# Patient Record
Sex: MALE | Race: BLACK OR AFRICAN AMERICAN | NOT HISPANIC OR LATINO | Employment: UNEMPLOYED | URBAN - METROPOLITAN AREA
[De-identification: names, ages, dates, MRNs, and addresses within clinical notes are randomized per-mention and may not be internally consistent; named-entity substitution may affect disease eponyms.]

---

## 2023-06-07 ENCOUNTER — OFFICE VISIT (OUTPATIENT)
Dept: PRIMARY CARE | Facility: CLINIC | Age: 38
End: 2023-06-07
Payer: MEDICAID

## 2023-06-07 VITALS
HEART RATE: 96 BPM | TEMPERATURE: 97.1 F | HEIGHT: 69 IN | BODY MASS INDEX: 23.85 KG/M2 | WEIGHT: 161 LBS | DIASTOLIC BLOOD PRESSURE: 79 MMHG | SYSTOLIC BLOOD PRESSURE: 122 MMHG

## 2023-06-07 DIAGNOSIS — Z20.2 STD EXPOSURE: ICD-10-CM

## 2023-06-07 DIAGNOSIS — Z00.00 HEALTHCARE MAINTENANCE: ICD-10-CM

## 2023-06-07 DIAGNOSIS — F17.210 SMOKING GREATER THAN 20 PACK YEARS: ICD-10-CM

## 2023-06-07 DIAGNOSIS — H53.2 DIPLOPIA: Primary | ICD-10-CM

## 2023-06-07 DIAGNOSIS — R14.0 ABDOMINAL DISTENTION: ICD-10-CM

## 2023-06-07 LAB
ALANINE AMINOTRANSFERASE (SGPT) (U/L) IN SER/PLAS: 17 U/L (ref 10–52)
ALBUMIN (G/DL) IN SER/PLAS: 4.7 G/DL (ref 3.4–5)
ALKALINE PHOSPHATASE (U/L) IN SER/PLAS: 72 U/L (ref 33–120)
ANION GAP IN SER/PLAS: 12 MMOL/L (ref 10–20)
ASPARTATE AMINOTRANSFERASE (SGOT) (U/L) IN SER/PLAS: 14 U/L (ref 9–39)
BASOPHILS (10*3/UL) IN BLOOD BY AUTOMATED COUNT: 0.02 X10E9/L (ref 0–0.1)
BASOPHILS/100 LEUKOCYTES IN BLOOD BY AUTOMATED COUNT: 0.5 % (ref 0–2)
BILIRUBIN TOTAL (MG/DL) IN SER/PLAS: 0.6 MG/DL (ref 0–1.2)
CALCIDIOL (25 OH VITAMIN D3) (NG/ML) IN SER/PLAS: 54 NG/ML
CALCIUM (MG/DL) IN SER/PLAS: 10 MG/DL (ref 8.6–10.6)
CARBON DIOXIDE, TOTAL (MMOL/L) IN SER/PLAS: 28 MMOL/L (ref 21–32)
CHLORIDE (MMOL/L) IN SER/PLAS: 104 MMOL/L (ref 98–107)
CHOLESTEROL (MG/DL) IN SER/PLAS: 177 MG/DL (ref 0–199)
CHOLESTEROL IN HDL (MG/DL) IN SER/PLAS: 36.2 MG/DL
CHOLESTEROL/HDL RATIO: 4.9
CREATININE (MG/DL) IN SER/PLAS: 1.3 MG/DL (ref 0.5–1.3)
EOSINOPHILS (10*3/UL) IN BLOOD BY AUTOMATED COUNT: 0.13 X10E9/L (ref 0–0.7)
EOSINOPHILS/100 LEUKOCYTES IN BLOOD BY AUTOMATED COUNT: 3 % (ref 0–6)
ERYTHROCYTE DISTRIBUTION WIDTH (RATIO) BY AUTOMATED COUNT: 12.1 % (ref 11.5–14.5)
ERYTHROCYTE MEAN CORPUSCULAR HEMOGLOBIN CONCENTRATION (G/DL) BY AUTOMATED: 31.4 G/DL (ref 32–36)
ERYTHROCYTE MEAN CORPUSCULAR VOLUME (FL) BY AUTOMATED COUNT: 87 FL (ref 80–100)
ERYTHROCYTES (10*6/UL) IN BLOOD BY AUTOMATED COUNT: 5.49 X10E12/L (ref 4.5–5.9)
GFR MALE: 73 ML/MIN/1.73M2
GLUCOSE (MG/DL) IN SER/PLAS: 97 MG/DL (ref 74–99)
HEMATOCRIT (%) IN BLOOD BY AUTOMATED COUNT: 47.7 % (ref 41–52)
HEMOGLOBIN (G/DL) IN BLOOD: 15 G/DL (ref 13.5–17.5)
HEPATITIS B VIRUS SURFACE AG PRESENCE IN SERUM: NONREACTIVE
HEPATITIS C VIRUS AB PRESENCE IN SERUM: NONREACTIVE
HIV 1/ 2 AG/AB SCREEN: NONREACTIVE
IMMATURE GRANULOCYTES/100 LEUKOCYTES IN BLOOD BY AUTOMATED COUNT: 0.2 % (ref 0–0.9)
LDL: 112 MG/DL (ref 0–99)
LEUKOCYTES (10*3/UL) IN BLOOD BY AUTOMATED COUNT: 4.3 X10E9/L (ref 4.4–11.3)
LYMPHOCYTES (10*3/UL) IN BLOOD BY AUTOMATED COUNT: 1.27 X10E9/L (ref 1.2–4.8)
LYMPHOCYTES/100 LEUKOCYTES IN BLOOD BY AUTOMATED COUNT: 29.3 % (ref 13–44)
MONOCYTES (10*3/UL) IN BLOOD BY AUTOMATED COUNT: 0.41 X10E9/L (ref 0.1–1)
MONOCYTES/100 LEUKOCYTES IN BLOOD BY AUTOMATED COUNT: 9.4 % (ref 2–10)
NEUTROPHILS (10*3/UL) IN BLOOD BY AUTOMATED COUNT: 2.5 X10E9/L (ref 1.2–7.7)
NEUTROPHILS/100 LEUKOCYTES IN BLOOD BY AUTOMATED COUNT: 57.6 % (ref 40–80)
NRBC (PER 100 WBCS) BY AUTOMATED COUNT: 0 /100 WBC (ref 0–0)
PLATELETS (10*3/UL) IN BLOOD AUTOMATED COUNT: 246 X10E9/L (ref 150–450)
POTASSIUM (MMOL/L) IN SER/PLAS: 4.8 MMOL/L (ref 3.5–5.3)
PROTEIN TOTAL: 7.4 G/DL (ref 6.4–8.2)
SODIUM (MMOL/L) IN SER/PLAS: 139 MMOL/L (ref 136–145)
THYROTROPIN (MIU/L) IN SER/PLAS BY DETECTION LIMIT <= 0.05 MIU/L: 0.83 MIU/L (ref 0.44–3.98)
TRIGLYCERIDE (MG/DL) IN SER/PLAS: 146 MG/DL (ref 0–149)
UREA NITROGEN (MG/DL) IN SER/PLAS: 11 MG/DL (ref 6–23)
VLDL: 29 MG/DL (ref 0–40)

## 2023-06-07 PROCEDURE — 80053 COMPREHEN METABOLIC PANEL: CPT

## 2023-06-07 PROCEDURE — 99385 PREV VISIT NEW AGE 18-39: CPT | Performed by: FAMILY MEDICINE

## 2023-06-07 PROCEDURE — 82306 VITAMIN D 25 HYDROXY: CPT

## 2023-06-07 PROCEDURE — 87340 HEPATITIS B SURFACE AG IA: CPT

## 2023-06-07 PROCEDURE — 84443 ASSAY THYROID STIM HORMONE: CPT

## 2023-06-07 PROCEDURE — 80061 LIPID PANEL: CPT

## 2023-06-07 PROCEDURE — 86592 SYPHILIS TEST NON-TREP QUAL: CPT

## 2023-06-07 PROCEDURE — 87389 HIV-1 AG W/HIV-1&-2 AB AG IA: CPT

## 2023-06-07 PROCEDURE — 87491 CHLMYD TRACH DNA AMP PROBE: CPT

## 2023-06-07 PROCEDURE — 85025 COMPLETE CBC W/AUTO DIFF WBC: CPT

## 2023-06-07 PROCEDURE — 86803 HEPATITIS C AB TEST: CPT

## 2023-06-07 PROCEDURE — 87591 N.GONORRHOEAE DNA AMP PROB: CPT
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
## 2023-06-07 NOTE — PROGRESS NOTES
"I saw and evaluated the patient. I personally obtained the key and critical portions of the history and physical exam. I reviewed the student 's documentation and discussed the patient with the student. I agree with the student medical decision making as documented in the student's noteSubjective   Patient ID: Jesse Rico is a 38 y.o. male who presents for No chief complaint on file..    HPI     Review of Systems    Objective   /79   Pulse 96   Temp 36.2 °C (97.1 °F)   Ht 1.753 m (5' 9\")   Wt 73 kg (161 lb)   BMI 23.78 kg/m²     Physical Exam    Assessment/Plan     I saw and evaluated the patient. I personally obtained the key and critical portions of the history and physical exam. I reviewed the student 's documentation and discussed the patient with the student. I agree with the student medical decision making as documented in the student's note    He does have a ventral hernia abdominal distention could be due to weak muscles but I will order ultrasound of the abdomen to rule out any other pathology    I firmly advised patient to get to the gym if possible daily to increase his aerobic activity and also muscle strengthening activities    This will help to condition his body and also to gain muscles which will help him to have a better appetite better attitude about himself    I also advised him to look for a job his hobbies martial arts and music    Advised him to get into a job where he can train people on martial arts and that will help him to feel good about himself as well    Routine labs were drawn today and also checks for STD referred him to neurology for the new onset of diplopia referred him to smoking cessation class    Advised him to come back in 3 months         "

## 2023-06-07 NOTE — PATIENT INSTRUCTIONS
1.  Every day sleep  at night or rest ( some days we are unable to sleep )around the same time without the TV-this is important habit to reduce dementia and aging prematurely    2.  Eat 3 cups of green leafy vegetables daily include at least 1 fruit.,  Reduce animal protein consumption-/ also  Starch  consumption  --this will help to lose weight avoid illnesses such as dementia high blood pressure cancer.,  Diabetes    3.  Exercise including aerobics as well as resistant exercise for at least 45 minutes a day for 5 days this will also help to reduce premature aging     4.  Please see the neurologist since  you have this double vision and is  newly developed will need to find out what is wrong    5.  Also I referred you to smoking cessation class    Please see me back in 3 months

## 2023-06-08 LAB
CHLAMYDIA TRACH., AMPLIFIED: NEGATIVE
N. GONORRHEA, AMPLIFIED: NEGATIVE
RPR MONITORING: NONREACTIVE

## 2024-01-11 ENCOUNTER — HOSPITAL ENCOUNTER (EMERGENCY)
Facility: HOSPITAL | Age: 39
Discharge: HOME | End: 2024-01-11
Attending: EMERGENCY MEDICINE
Payer: MEDICAID

## 2024-01-11 ENCOUNTER — CLINICAL SUPPORT (OUTPATIENT)
Dept: EMERGENCY MEDICINE | Facility: HOSPITAL | Age: 39
End: 2024-01-11
Payer: MEDICAID

## 2024-01-11 VITALS
HEIGHT: 68 IN | HEART RATE: 55 BPM | OXYGEN SATURATION: 98 % | DIASTOLIC BLOOD PRESSURE: 78 MMHG | SYSTOLIC BLOOD PRESSURE: 130 MMHG | TEMPERATURE: 97.2 F | RESPIRATION RATE: 14 BRPM | BODY MASS INDEX: 26.52 KG/M2 | WEIGHT: 175 LBS

## 2024-01-11 DIAGNOSIS — T40.2X1A OPIOID OVERDOSE, ACCIDENTAL OR UNINTENTIONAL, INITIAL ENCOUNTER (MULTI): Primary | ICD-10-CM

## 2024-01-11 LAB
ALBUMIN SERPL BCP-MCNC: 4.4 G/DL (ref 3.4–5)
ALP SERPL-CCNC: 68 U/L (ref 33–120)
ALT SERPL W P-5'-P-CCNC: 24 U/L (ref 10–52)
AMPHETAMINES UR QL SCN: ABNORMAL
ANION GAP BLDV CALCULATED.4IONS-SCNC: 12 MMOL/L (ref 10–25)
ANION GAP SERPL CALC-SCNC: 14 MMOL/L (ref 10–20)
APAP SERPL-MCNC: <10 UG/ML
AST SERPL W P-5'-P-CCNC: 24 U/L (ref 9–39)
BARBITURATES UR QL SCN: ABNORMAL
BASE EXCESS BLDV CALC-SCNC: -4.2 MMOL/L (ref -2–3)
BENZODIAZ UR QL SCN: ABNORMAL
BILIRUB SERPL-MCNC: 0.5 MG/DL (ref 0–1.2)
BODY TEMPERATURE: 37 DEGREES CELSIUS
BUN SERPL-MCNC: 7 MG/DL (ref 6–23)
BZE UR QL SCN: ABNORMAL
CA-I BLDV-SCNC: 1.18 MMOL/L (ref 1.1–1.33)
CALCIUM SERPL-MCNC: 8.9 MG/DL (ref 8.6–10.6)
CANNABINOIDS UR QL SCN: ABNORMAL
CHLORIDE BLDV-SCNC: 105 MMOL/L (ref 98–107)
CHLORIDE SERPL-SCNC: 105 MMOL/L (ref 98–107)
CO2 SERPL-SCNC: 24 MMOL/L (ref 21–32)
CREAT SERPL-MCNC: 1.23 MG/DL (ref 0.5–1.3)
EGFRCR SERPLBLD CKD-EPI 2021: 78 ML/MIN/1.73M*2
ERYTHROCYTE [DISTWIDTH] IN BLOOD BY AUTOMATED COUNT: 12.4 % (ref 11.5–14.5)
ETHANOL SERPL-MCNC: <10 MG/DL
FENTANYL+NORFENTANYL UR QL SCN: ABNORMAL
GLUCOSE BLDV-MCNC: 213 MG/DL (ref 74–99)
GLUCOSE SERPL-MCNC: 275 MG/DL (ref 74–99)
HCO3 BLDV-SCNC: 22.6 MMOL/L (ref 22–26)
HCT VFR BLD AUTO: 42.3 % (ref 41–52)
HCT VFR BLD EST: 38 % (ref 41–52)
HGB BLD-MCNC: 13.4 G/DL (ref 13.5–17.5)
HGB BLDV-MCNC: 12.8 G/DL (ref 13.5–17.5)
LACTATE BLDV-SCNC: 2.2 MMOL/L (ref 0.4–2)
MCH RBC QN AUTO: 27.3 PG (ref 26–34)
MCHC RBC AUTO-ENTMCNC: 31.7 G/DL (ref 32–36)
MCV RBC AUTO: 86 FL (ref 80–100)
NRBC BLD-RTO: 0 /100 WBCS (ref 0–0)
OPIATES UR QL SCN: ABNORMAL
OXYCODONE+OXYMORPHONE UR QL SCN: ABNORMAL
OXYHGB MFR BLDV: 91.6 % (ref 45–75)
PCO2 BLDV: 47 MM HG (ref 41–51)
PCP UR QL SCN: ABNORMAL
PH BLDV: 7.29 PH (ref 7.33–7.43)
PLATELET # BLD AUTO: 286 X10*3/UL (ref 150–450)
PO2 BLDV: 103 MM HG (ref 35–45)
POTASSIUM BLDV-SCNC: 3.6 MMOL/L (ref 3.5–5.3)
POTASSIUM SERPL-SCNC: 3.2 MMOL/L (ref 3.5–5.3)
PROT SERPL-MCNC: 7 G/DL (ref 6.4–8.2)
RBC # BLD AUTO: 4.91 X10*6/UL (ref 4.5–5.9)
SALICYLATES SERPL-MCNC: <3 MG/DL
SAO2 % BLDV: 100 % (ref 45–75)
SODIUM BLDV-SCNC: 136 MMOL/L (ref 136–145)
SODIUM SERPL-SCNC: 140 MMOL/L (ref 136–145)
WBC # BLD AUTO: 11.7 X10*3/UL (ref 4.4–11.3)

## 2024-01-11 PROCEDURE — 85027 COMPLETE CBC AUTOMATED: CPT | Performed by: STUDENT IN AN ORGANIZED HEALTH CARE EDUCATION/TRAINING PROGRAM

## 2024-01-11 PROCEDURE — 99291 CRITICAL CARE FIRST HOUR: CPT | Mod: 25 | Performed by: EMERGENCY MEDICINE

## 2024-01-11 PROCEDURE — 80307 DRUG TEST PRSMV CHEM ANLYZR: CPT | Performed by: STUDENT IN AN ORGANIZED HEALTH CARE EDUCATION/TRAINING PROGRAM

## 2024-01-11 PROCEDURE — 2500000004 HC RX 250 GENERAL PHARMACY W/ HCPCS (ALT 636 FOR OP/ED): Mod: SE

## 2024-01-11 PROCEDURE — 96374 THER/PROPH/DIAG INJ IV PUSH: CPT

## 2024-01-11 PROCEDURE — 93005 ELECTROCARDIOGRAM TRACING: CPT

## 2024-01-11 PROCEDURE — 2500000001 HC RX 250 WO HCPCS SELF ADMINISTERED DRUGS (ALT 637 FOR MEDICARE OP): Mod: SE | Performed by: STUDENT IN AN ORGANIZED HEALTH CARE EDUCATION/TRAINING PROGRAM

## 2024-01-11 PROCEDURE — 84132 ASSAY OF SERUM POTASSIUM: CPT

## 2024-01-11 PROCEDURE — 36415 COLL VENOUS BLD VENIPUNCTURE: CPT | Performed by: STUDENT IN AN ORGANIZED HEALTH CARE EDUCATION/TRAINING PROGRAM

## 2024-01-11 PROCEDURE — 84132 ASSAY OF SERUM POTASSIUM: CPT | Performed by: STUDENT IN AN ORGANIZED HEALTH CARE EDUCATION/TRAINING PROGRAM

## 2024-01-11 PROCEDURE — 99291 CRITICAL CARE FIRST HOUR: CPT | Performed by: EMERGENCY MEDICINE

## 2024-01-11 PROCEDURE — 80179 DRUG ASSAY SALICYLATE: CPT | Performed by: STUDENT IN AN ORGANIZED HEALTH CARE EDUCATION/TRAINING PROGRAM

## 2024-01-11 RX ORDER — NALOXONE HYDROCHLORIDE 4 MG/.1ML
4 SPRAY NASAL AS NEEDED
Qty: 1 EACH | Refills: 0 | Status: ACTIVE
Start: 2024-01-11 | End: 2024-01-12

## 2024-01-11 RX ORDER — ONDANSETRON HYDROCHLORIDE 2 MG/ML
4 INJECTION, SOLUTION INTRAVENOUS ONCE
Status: COMPLETED | OUTPATIENT
Start: 2024-01-11 | End: 2024-01-11

## 2024-01-11 RX ORDER — NALOXONE HYDROCHLORIDE 4 MG/.1ML
SPRAY NASAL
Status: COMPLETED
Start: 2024-01-11 | End: 2024-01-11

## 2024-01-11 RX ORDER — POTASSIUM CHLORIDE 1.5 G/1.58G
20 POWDER, FOR SOLUTION ORAL ONCE
Status: COMPLETED | OUTPATIENT
Start: 2024-01-11 | End: 2024-01-11

## 2024-01-11 RX ORDER — ONDANSETRON HYDROCHLORIDE 2 MG/ML
INJECTION, SOLUTION INTRAVENOUS
Status: COMPLETED
Start: 2024-01-11 | End: 2024-01-11

## 2024-01-11 RX ADMIN — ONDANSETRON HYDROCHLORIDE 4 MG: 2 INJECTION, SOLUTION INTRAVENOUS at 11:41

## 2024-01-11 RX ADMIN — POTASSIUM CHLORIDE 20 MEQ: 1.5 POWDER, FOR SOLUTION ORAL at 13:20

## 2024-01-11 RX ADMIN — NALOXONE HYDROCHLORIDE: 4 SPRAY NASAL at 15:35

## 2024-01-11 RX ADMIN — ONDANSETRON 4 MG: 2 INJECTION INTRAMUSCULAR; INTRAVENOUS at 11:41

## 2024-01-11 ASSESSMENT — LIFESTYLE VARIABLES
HAVE YOU EVER FELT YOU SHOULD CUT DOWN ON YOUR DRINKING: NO
EVER HAD A DRINK FIRST THING IN THE MORNING TO STEADY YOUR NERVES TO GET RID OF A HANGOVER: NO
EVER FELT BAD OR GUILTY ABOUT YOUR DRINKING: NO
HAVE PEOPLE ANNOYED YOU BY CRITICIZING YOUR DRINKING: NO
REASON UNABLE TO ASSESS: NO

## 2024-01-11 ASSESSMENT — PAIN SCALES - GENERAL
PAINLEVEL_OUTOF10: 0 - NO PAIN

## 2024-01-11 ASSESSMENT — PAIN - FUNCTIONAL ASSESSMENT
PAIN_FUNCTIONAL_ASSESSMENT: 0-10
PAIN_FUNCTIONAL_ASSESSMENT: 0-10

## 2024-01-11 ASSESSMENT — COLUMBIA-SUICIDE SEVERITY RATING SCALE - C-SSRS
1. IN THE PAST MONTH, HAVE YOU WISHED YOU WERE DEAD OR WISHED YOU COULD GO TO SLEEP AND NOT WAKE UP?: NO
2. HAVE YOU ACTUALLY HAD ANY THOUGHTS OF KILLING YOURSELF?: NO
6. HAVE YOU EVER DONE ANYTHING, STARTED TO DO ANYTHING, OR PREPARED TO DO ANYTHING TO END YOUR LIFE?: NO

## 2024-01-11 NOTE — ED PROVIDER NOTES
CC: Drug Overdose     HPI:  Jesse Rico is a 38 y.o. male with a past medical history of bipolar disorder, polysubstance use, presenting to the emergency department today due to altered mental status.  He was found down by one of his friends.  He was reportedly found near a crack house.  His friend states that he is concern for possible OD.  Family does state that he has a history of significant psychiatric disorder and has OD'd in the past.  Patient was altered and unable to provide additional history on initial evaluation.    Limitations to History:  Critical condition  Additional History provided by:  Family member,    External Records Reviewed:  Recent available ED and inpatient notes reviewed in EMR.  Reviewed ED note from 6/19/2023 and outside hospital    PMHx/PSHx:  Per HPI.   - has no past medical history on file.  - has no past surgical history on file.    Medications:  Reviewed in EMR. See EMR for complete list of medications and doses.    Allergies:  Lactase    Social History:  - Tobacco:  reports that he has been smoking cigarettes. He has been smoking an average of 1 pack per day. He does not have any smokeless tobacco history on file.   - Alcohol:  reports current alcohol use of about 1.0 standard drink of alcohol per week.   - Illicit Drugs:  has no history on file for drug use.     ROS:  Per HPI.     ???????????????????????????????????????????????????????????????  Triage Vitals:  T 36.2 °C (97.2 °F)  HR 84  BP (!) 174/108  RR (!) 0  O2 (!) 24 % Supplemental oxygen    Physical Exam  Vitals and nursing note reviewed.   Constitutional:       Appearance: He is well-developed. He is ill-appearing, toxic-appearing and diaphoretic.      Comments: unresponsive   HENT:      Head: Normocephalic and atraumatic.   Eyes:      Conjunctiva/sclera: Conjunctivae normal.   Cardiovascular:      Rate and Rhythm: Normal rate and regular rhythm.      Heart sounds: No murmur heard.  Pulmonary:      Effort: Respiratory  distress present.      Breath sounds: Decreased air movement present.      Comments: apnea  Abdominal:      Palpations: Abdomen is soft.      Tenderness: There is no abdominal tenderness.   Musculoskeletal:         General: No swelling.      Cervical back: Neck supple.   Skin:     General: Skin is warm.      Capillary Refill: Capillary refill takes less than 2 seconds.   Neurological:      Mental Status: He is unresponsive.      GCS: GCS eye subscore is 1. GCS verbal subscore is 1. GCS motor subscore is 1.       ???????????????????????????????????????????????????????????????  ED Course:  ED Course as of 01/11/24 1506   u Jan 11, 2024   1031 Initial VBG with a pH of 7.07, pCO2 of 82, glucose of 291, lactate of 4.1, potassium of 3.2, obtained at 10:31 AM [SC]   1124 Repeat VBG with a pH of 7.29, glucose 213, lactate of 2.2, pCO2 47 [SC]      ED Course User Index  [SC] Albina Rico MD         Diagnoses as of 01/11/24 1506   Opioid overdose, accidental or unintentional, initial encounter (CMS/[facility])       EKG & Images:  Independently reviewed, See ED Course      MDM:  -The patient is a roughly 38-year-old male with a past medical history of bipolar disorder, polysubstance use, presenting to the emergency department today due to altered mental status.  He was found down by one of his friends.  Per further history from friends and family, he was at a crack house and his friend got called to come pick him up because he was altered.  On arrival, he was subtended, flaccid, not responsive.  He was diaphoretic and was not breathing significantly.  As such, patient was given dose of Narcan and BVM was initiated.  After dose of Narcan he did begin having some voluntary movements.  However did not appear to be purposeful, was still too altered to communicate.  As such, a second dose of Narcan was administered which continue to improve his mental status.  His glucose was normal.  Given the history of likely opioid overdose,  versus other toxidrome.  Less likely an intracranial pathology however cannot fully rule out at this time.  Lab work was obtained.  Patient did test positive for cannabis, cocaine, fentanyl.  His mental status continued to improve throughout his ED stay.  He was eventually weaned off all oxygen and was able to communicate clearly.  As such, he was continue to observe.  After several hours continued to be stable at his mental baseline.  He was offered [facility] was amenable.  [facility] will plan to coordinate placement for him.  At time of signout was pending transport to [facility].  Patient was given a take-home naloxone kit.    Final diagnoses:   [T40.2X1A] Opioid overdose, accidental or unintentional, initial encounter (CMS/[facility])       ED attending attestation:  Brought in by friend from a drug house with concerns for overdose.  History gleaned from friend.  Friend was called and told that patient was in his house and unresponsive.  Friend drove over took the patient out of the house and drove to the emergency department.  Upon arrival patient bradypneic and hypoxemic.  Patient respiratory rate 4 and pulse oximetry in the 60s.  We initiated positive pressure bag-valve-mask and after obtaining IV access administered 4 mg of naloxone.  Approximately 5 minutes after naloxone administration respiratory rate improved as did his mental status.  Patient maintained on end-tidal CO2 monitoring.  Patient's family arrived and corroborating history obtained.  Patient with history of dual diagnosis schizoaffective disorder and polysubstance use, predominantly crack cocaine.  Mother woke up at 03 100 and noticed patient was not in his bedroom.  She then Fanny to the local neighborhood and got worried that patient was in a drug house.  She called her son's friend who went in and extracted him from the house.  Patient has a history of unintentional overdose with 1 at [facility] requiring intubation and ICU stay.  Patient has a  primary care doctor, [facility] Melinda Duarte is also seeing a psychiatrist at [facility].  Patient was recently started on trazodone and offered buprenorphine.  Patient has not remarkably after naloxone administration.  Will continue to monitor and do a broad metabolic workup.  Will also get a chest x-ray given aspiration risk.  Patient's disposition will depend upon his staying at baseline.  We will then talk to our [facility] team about rehabilitation or dual diagnosis options    Social Determinants Limiting Care:  Mental health issues    Disposition:  Discharge    Albina Rico MD   Emergency Medicine Resident, PGY3  [facility]     Disclaimer: This note was dictated by speech recognition. Minor errors in transcription may be present    Procedures ? Smart[employer] last updated 1/11/2024 3:06 PM        Albina Rico MD  Resident  01/11/24 [id number]

## 2024-01-11 NOTE — ED TRIAGE NOTES
Patient presents to the Emergency department with a chief complaint of concern for overdose. Patient was brought in by a friend after he stopped breathing. Patient friend states it is possible that he could have overdosed, unsure of what it was. Patient was apneic upon arrival, given 2mg x2 of IV narcan. Patient began to protect his own airway. GCS up to 12 after initial GCS of 3

## 2024-01-11 NOTE — ED PROVIDER NOTES
HPI   Chief Complaint   Patient presents with    Drug Overdose       HPI                    Diamond Coma Scale Score: 15                  Patient History   No past medical history on file.  No past surgical history on file.  No family history on file.  Social History     Tobacco Use    Smoking status: Every Day     Packs/day: 1     Types: Cigarettes    Smokeless tobacco: Not on file   Substance Use Topics    Alcohol use: Yes     Alcohol/week: 1.0 standard drink of alcohol     Types: 1 Shots of liquor per week    Drug use: Not on file       Physical Exam   ED Triage Vitals   Temp Heart Rate Resp BP   01/11/24 1102 01/11/24 1100 01/11/24 1100 01/11/24 1100   36.2 °C (97.2 °F) 84 (!) 0 (!) 174/108      SpO2 Temp Source Heart Rate Source Patient Position   01/11/24 1100 01/11/24 1102 01/11/24 1135 --   (!) 24 % Tympanic Monitor       BP Location FiO2 (%)     -- 01/11/24 1100      21 %       Physical Exam    ED Course & MDM   ED Course as of 01/11/24 1218   Thu Jan 11, 2024   1031 Initial VBG with a pH of 7.07, pCO2 of 82, glucose of 291, lactate of 4.1, potassium of 3.2, obtained at 10:31 AM [SC]      ED Course User Index  [SC] Albina Rico MD       Medical Decision Making        Procedure  Procedures

## 2024-01-11 NOTE — ED PROCEDURE NOTE
Procedure  Critical Care    Performed by: Lawrence Shepherd MD  Authorized by: Lawrence Shepherd MD    Critical care provider statement:     Critical care time (minutes):  37    Critical care time was exclusive of:  Teaching time    Critical care was necessary to treat or prevent imminent or life-threatening deterioration of the following conditions:  Respiratory failure and toxidrome    Critical care was time spent personally by me on the following activities:  Blood draw for specimens, development of treatment plan with patient or surrogate, evaluation of patient's response to treatment, examination of patient, obtaining history from patient or surrogate, ordering and performing treatments and interventions, ordering and review of laboratory studies, ordering and review of radiographic studies, pulse oximetry and re-evaluation of patient's condition  Comments:      Patient brought in from alleged drug has with bradypnea, constricted pupils and hypoxia.  Hypoxic to 60s on room air with no respiratory effort given 4 mg of Narcan with improvement in respirations.  Patient initially with a respiratory acidemia that is improved with opiate reversal.  Patient now awake and states used heroin and cocaine today.  Will continue to monitor with end-tidal CO2 and evaluate for continued respiratory effort               Lawrence Shepherd MD  01/11/24 1020

## 2024-01-11 NOTE — DISCHARGE INSTRUCTIONS
Please avoid using any intoxicating substances particularly from people you don't know. Please keep the narcan kit with you in case of overdose.

## 2024-01-12 LAB
ATRIAL RATE: 73 BPM
P AXIS: 56 DEGREES
P OFFSET: 199 MS
P ONSET: 141 MS
PR INTERVAL: 154 MS
Q ONSET: 218 MS
QRS COUNT: 12 BEATS
QRS DURATION: 86 MS
QT INTERVAL: 406 MS
QTC CALCULATION(BAZETT): 447 MS
QTC FREDERICIA: 433 MS
R AXIS: 71 DEGREES
T AXIS: 48 DEGREES
T OFFSET: 421 MS
VENTRICULAR RATE: 73 BPM

## 2024-04-08 ENCOUNTER — HOSPITAL ENCOUNTER (EMERGENCY)
Facility: HOSPITAL | Age: 39
Discharge: HOME | End: 2024-04-08
Attending: GENERAL PRACTICE
Payer: MEDICAID

## 2024-04-08 VITALS
TEMPERATURE: 98.2 F | SYSTOLIC BLOOD PRESSURE: 116 MMHG | HEART RATE: 80 BPM | DIASTOLIC BLOOD PRESSURE: 73 MMHG | RESPIRATION RATE: 18 BRPM | OXYGEN SATURATION: 96 %

## 2024-04-08 DIAGNOSIS — F19.10 POLYSUBSTANCE ABUSE (MULTI): Primary | ICD-10-CM

## 2024-04-08 LAB
ALBUMIN SERPL BCP-MCNC: 4.8 G/DL (ref 3.4–5)
ALP SERPL-CCNC: 72 U/L (ref 33–120)
ALT SERPL W P-5'-P-CCNC: 14 U/L (ref 10–52)
AMPHETAMINES UR QL SCN: ABNORMAL
ANION GAP SERPL CALC-SCNC: 14 MMOL/L (ref 10–20)
APAP SERPL-MCNC: <10 UG/ML
AST SERPL W P-5'-P-CCNC: 15 U/L (ref 9–39)
BARBITURATES UR QL SCN: ABNORMAL
BASOPHILS # BLD AUTO: 0.02 X10*3/UL (ref 0–0.1)
BASOPHILS NFR BLD AUTO: 0.4 %
BENZODIAZ UR QL SCN: ABNORMAL
BILIRUB SERPL-MCNC: 0.9 MG/DL (ref 0–1.2)
BUN SERPL-MCNC: 13 MG/DL (ref 6–23)
BZE UR QL SCN: ABNORMAL
CALCIUM SERPL-MCNC: 9.6 MG/DL (ref 8.6–10.3)
CANNABINOIDS UR QL SCN: ABNORMAL
CHLORIDE SERPL-SCNC: 103 MMOL/L (ref 98–107)
CO2 SERPL-SCNC: 23 MMOL/L (ref 21–32)
CREAT SERPL-MCNC: 1.5 MG/DL (ref 0.5–1.3)
EGFRCR SERPLBLD CKD-EPI 2021: 61 ML/MIN/1.73M*2
EOSINOPHIL # BLD AUTO: 0.16 X10*3/UL (ref 0–0.7)
EOSINOPHIL NFR BLD AUTO: 3.1 %
ERYTHROCYTE [DISTWIDTH] IN BLOOD BY AUTOMATED COUNT: 13.2 % (ref 11.5–14.5)
ETHANOL SERPL-MCNC: <10 MG/DL
FENTANYL+NORFENTANYL UR QL SCN: ABNORMAL
FLUAV RNA RESP QL NAA+PROBE: NOT DETECTED
FLUBV RNA RESP QL NAA+PROBE: NOT DETECTED
GLUCOSE SERPL-MCNC: 93 MG/DL (ref 74–99)
HCT VFR BLD AUTO: 48.2 % (ref 41–52)
HGB BLD-MCNC: 16.2 G/DL (ref 13.5–17.5)
IMM GRANULOCYTES # BLD AUTO: 0.01 X10*3/UL (ref 0–0.7)
IMM GRANULOCYTES NFR BLD AUTO: 0.2 % (ref 0–0.9)
LYMPHOCYTES # BLD AUTO: 1.46 X10*3/UL (ref 1.2–4.8)
LYMPHOCYTES NFR BLD AUTO: 28.1 %
MCH RBC QN AUTO: 28.5 PG (ref 26–34)
MCHC RBC AUTO-ENTMCNC: 33.6 G/DL (ref 32–36)
MCV RBC AUTO: 85 FL (ref 80–100)
METHADONE UR QL SCN: ABNORMAL
MONOCYTES # BLD AUTO: 0.76 X10*3/UL (ref 0.1–1)
MONOCYTES NFR BLD AUTO: 14.6 %
NEUTROPHILS # BLD AUTO: 2.78 X10*3/UL (ref 1.2–7.7)
NEUTROPHILS NFR BLD AUTO: 53.6 %
NRBC BLD-RTO: 0 /100 WBCS (ref 0–0)
OPIATES UR QL SCN: ABNORMAL
OXYCODONE+OXYMORPHONE UR QL SCN: ABNORMAL
PCP UR QL SCN: ABNORMAL
PLATELET # BLD AUTO: 303 X10*3/UL (ref 150–450)
POTASSIUM SERPL-SCNC: 3.9 MMOL/L (ref 3.5–5.3)
PROT SERPL-MCNC: 7.2 G/DL (ref 6.4–8.2)
RBC # BLD AUTO: 5.68 X10*6/UL (ref 4.5–5.9)
SALICYLATES SERPL-MCNC: <3 MG/DL
SARS-COV-2 RNA RESP QL NAA+PROBE: NOT DETECTED
SODIUM SERPL-SCNC: 136 MMOL/L (ref 136–145)
WBC # BLD AUTO: 5.2 X10*3/UL (ref 4.4–11.3)

## 2024-04-08 PROCEDURE — 99284 EMERGENCY DEPT VISIT MOD MDM: CPT

## 2024-04-08 PROCEDURE — 80143 DRUG ASSAY ACETAMINOPHEN: CPT | Performed by: GENERAL PRACTICE

## 2024-04-08 PROCEDURE — 85025 COMPLETE CBC W/AUTO DIFF WBC: CPT | Performed by: GENERAL PRACTICE

## 2024-04-08 PROCEDURE — 80053 COMPREHEN METABOLIC PANEL: CPT | Performed by: GENERAL PRACTICE

## 2024-04-08 PROCEDURE — 99285 EMERGENCY DEPT VISIT HI MDM: CPT

## 2024-04-08 PROCEDURE — 87636 SARSCOV2 & INF A&B AMP PRB: CPT | Performed by: GENERAL PRACTICE

## 2024-04-08 PROCEDURE — 80307 DRUG TEST PRSMV CHEM ANLYZR: CPT | Performed by: GENERAL PRACTICE

## 2024-04-08 PROCEDURE — 36415 COLL VENOUS BLD VENIPUNCTURE: CPT | Performed by: GENERAL PRACTICE

## 2024-04-08 RX ORDER — ZIPRASIDONE HYDROCHLORIDE 20 MG/1
20 CAPSULE ORAL ONCE
Status: DISCONTINUED | OUTPATIENT
Start: 2024-04-08 | End: 2024-04-08

## 2024-04-08 SDOH — HEALTH STABILITY: MENTAL HEALTH: WISH TO BE DEAD (PAST 1 MONTH): NO

## 2024-04-08 SDOH — HEALTH STABILITY: MENTAL HEALTH: SUICIDAL BEHAVIOR (LIFETIME): NO

## 2024-04-08 SDOH — HEALTH STABILITY: MENTAL HEALTH: IN THE PAST WEEK, HAVE YOU BEEN HAVING THOUGHTS ABOUT KILLING YOURSELF?: NO

## 2024-04-08 SDOH — HEALTH STABILITY: MENTAL HEALTH: NON-SPECIFIC ACTIVE SUICIDAL THOUGHTS (PAST 1 MONTH): NO

## 2024-04-08 SDOH — HEALTH STABILITY: MENTAL HEALTH: DEPRESSION SYMPTOMS: NO PROBLEMS REPORTED OR OBSERVED.

## 2024-04-08 SDOH — HEALTH STABILITY: MENTAL HEALTH: ARE YOU HAVING THOUGHTS OF KILLING YOURSELF RIGHT NOW?: NO

## 2024-04-08 SDOH — HEALTH STABILITY: MENTAL HEALTH: IN THE PAST FEW WEEKS, HAVE YOU WISHED YOU WERE DEAD?: NO

## 2024-04-08 SDOH — ECONOMIC STABILITY: GENERAL

## 2024-04-08 SDOH — HEALTH STABILITY: MENTAL HEALTH: IN THE PAST FEW WEEKS, HAVE YOU FELT THAT YOU OR YOUR FAMILY WOULD BE BETTER OFF IF YOU WERE DEAD?: NO

## 2024-04-08 SDOH — HEALTH STABILITY: MENTAL HEALTH: HAVE YOU EVER TRIED TO KILL YOURSELF?: NO

## 2024-04-08 SDOH — HEALTH STABILITY: MENTAL HEALTH: ANXIETY SYMPTOMS: NO PROBLEMS REPORTED OR OBSERVED.

## 2024-04-08 SDOH — ECONOMIC STABILITY: HOUSING INSECURITY: FEELS SAFE LIVING IN HOME: YES

## 2024-04-08 ASSESSMENT — LIFESTYLE VARIABLES
PRESCIPTION_ABUSE_PAST_12_MONTHS: NO
SUBSTANCE_ABUSE_PAST_12_MONTHS: YES

## 2024-04-08 ASSESSMENT — COLUMBIA-SUICIDE SEVERITY RATING SCALE - C-SSRS
6. HAVE YOU EVER DONE ANYTHING, STARTED TO DO ANYTHING, OR PREPARED TO DO ANYTHING TO END YOUR LIFE?: NO
2. HAVE YOU ACTUALLY HAD ANY THOUGHTS OF KILLING YOURSELF?: NO
1. SINCE LAST CONTACT, HAVE YOU WISHED YOU WERE DEAD OR WISHED YOU COULD GO TO SLEEP AND NOT WAKE UP?: NO

## 2024-04-08 NOTE — ED TRIAGE NOTES
Patient to ED for psychiatric evaluation. According to EMS, patients mom who has guardianship over him reported patient smoked weed with heroin yesterday and weed with crack in it today. Mom wants patient evaluated.

## 2024-04-09 NOTE — ED PROVIDER NOTES
HPI   Chief Complaint   Patient presents with    Psychiatric Evaluation       HPI: 38-year-old male with a history of polysubstance abuse presents for agitation.  According to EMS his mother called EMS because the patient was acting agitated after taking several drugs.  She is his legal guardian and wants him placed in rehab if possible.  The patient is slightly agitated but is denying any suicidal or homicidal ideations.  He denies harming himself today.      Limitations to history: None  Independent Historians: Patient  External Records Reviewed: HIE, outpatient notes, inpatient notes  ------------------------------------------------------------------------------------------------------------------------------------------  ROS: a ten point review of systems was performed and was negative except as per HPI.  ------------------------------------------------------------------------------------------------------------------------------------------  PMH / PSH: as per HPI, otherwise reviewed in EMR  MEDS: as per HPI, otherwise reviewed in EMR  ALLERGIES: as per HPI, otherwise reviewed in EMR  SocH:  as per HPI, otherwise reviewed in EMR  FH:  as per HPI, otherwise reviewed in EMR  ------------------------------------------------------------------------------------------------------------------------------------------  Physical Exam:  VS: As documented in the triage note and EMR flowsheet from this visit was reviewed  General: Well appearing. No acute distress.   Eyes:  Extraocular movements grossly intact. No scleral icterus. No discharge  HEENT:  Normocephalic.  Atraumatic  Neck: Moves neck freely. No gross masses  CV: Regular rhythm. No murmurs, rubs or gallops   Resp: Clear to auscultation bilaterally. No respiratory distress.    GI: Soft, no masses, nontender. No rebound tenderness or guarding  MSK: Symmetric muscle bulk. No deformities. No lower extremity edema.    Skin: Warm, dry, intact.   Neuro: No focal  deficits.  A&O x3.   Psych: Patient seems slightly agitated and elevated but is alert and conversive  ------------------------------------------------------------------------------------------------------------------------------------------  Hospital Course / Medical Decision Making:  Independent Interpretations: N/A  EKG as interpreted by me: Sinus tachycardia 116 bpm with a normal axis, no bundle branch block no signs of acute ischemia    MDM: 38-year-old male with a history of polysubstance abuse presents for agitation.  According to his mother EMS was called due to agitation after taking drugs.  The patient is denying suicidal and homicidal ideations.  The patient is medically cleared.  He was evaluated by EPAT and determined to not require psychiatric hospitalization.  His mother is requesting that he be placed in a drug rehab center and the patient is amenable to this.  On reevaluation the patient is much more sober.  He was evaluated by [facility] and was accepted to [facility] rehab center.  The patient was transferred to [facility] in hemodynamically stable condition    Discussion of Management with Other Providers:   I discussed the patient/results with: Emergency medicine team    Final diagnosis and disposition as below.    Results for orders placed or performed during the hospital encounter of 04/08/24  -CBC and Auto Differential:        Result                      Value             Ref Range           WBC                         5.2               4.4 - 11.3 x*       nRBC                        0.0               0.0 - 0.0 /1*       RBC                         5.68              4.50 - 5.90 *       Hemoglobin                  16.2              13.5 - 17.5 *       Hematocrit                  48.2              41.0 - 52.0 %       MCV                         85                80 - 100 fL         MCH                         28.5              26.0 - 34.0 *       MCHC                        33.6               32.0 - 36.0 *       RDW                         13.2              11.5 - 14.5 %       Platelets                   303               150 - 450 x1*       Neutrophils %               53.6              40.0 - 80.0 %       Immature Granulocytes *     0.2               0.0 - 0.9 %         Lymphocytes %               28.1              13.0 - 44.0 %       Monocytes %                 14.6              2.0 - 10.0 %        Eosinophils %               3.1               0.0 - 6.0 %         Basophils %                 0.4               0.0 - 2.0 %         Neutrophils Absolute        2.78              1.20 - 7.70 *       Immature Granulocytes *     0.01              0.00 - 0.70 *       Lymphocytes Absolute        1.46              1.20 - 4.80 *       Monocytes Absolute          0.76              0.10 - 1.00 *       Eosinophils Absolute        0.16              0.00 - 0.70 *       Basophils Absolute          0.02              0.00 - 0.10 *  -Comprehensive metabolic panel:        Result                      Value             Ref Range           Glucose                     93                74 - 99 mg/dL       Sodium                      136               136 - 145 mm*       Potassium                   3.9               3.5 - 5.3 mm*       Chloride                    103               98 - 107 mmo*       Bicarbonate                 23                21 - 32 mmol*       Anion Gap                   14                10 - 20 mmol*       Urea Nitrogen               13                6 - 23 mg/dL        Creatinine                  1.50 (H)          0.50 - 1.30 *       eGFR                        61                >60 mL/min/1*       Calcium                     9.6               8.6 - 10.3 m*       Albumin                     4.8               3.4 - 5.0 g/*       Alkaline Phosphatase        72                33 - 120 U/L        Total Protein               7.2               6.4 - 8.2 g/*       AST                         15                9  - 39 U/L          Bilirubin, Total            0.9               0.0 - 1.2 mg*       ALT                         14                10 - 52 U/L    -Acute Toxicology Panel, Blood:        Result                      Value             Ref Range           Acetaminophen               <10.0             10.0 - 30.0 *       Salicylate                  <3                4 - 20 mg/dL        Alcohol                     <10               <=10 mg/dL     -Drug Screen, Urine:        Result                      Value             Ref Range           Amphetamine Screen, Ur*                       Presumptive *   Presumptive Negative       Barbiturate Screen, Ur*                       Presumptive *   Presumptive Negative       Benzodiazepines Screen*                       Presumptive *   Presumptive Negative       Cannabinoid Screen, Ur*                       Presumptive *   Presumptive Positive (A)       Cocaine Metabolite Scr*                       Presumptive *   Presumptive Positive (A)       Fentanyl Screen, Urine                        Presumptive *   Presumptive Positive (A)       Opiate Screen, Urine                          Presumptive *   Presumptive Negative       Oxycodone Screen, Urine                       Presumptive *   Presumptive Negative       PCP Screen, Urine                             Presumptive *   Presumptive Negative       Methadone Screen, Urine                       Presumptive *   Presumptive Negative  -Sars-CoV-2 and Influenza A/B PCR:        Result                      Value             Ref Range           Flu A Result                Not Detected      Not Detected        Flu B Result                Not Detected      Not Detected        Coronavirus 2019, PCR       Not Detected      Not Detected   No orders to display                            [place] Coma Scale Score: 14                     Patient History   No past medical history on file.  No past surgical history on file.  No family history on  file.  Social History     Tobacco Use    Smoking status: Every Day     Packs/day: 1     Types: Cigarettes    Smokeless tobacco: Not on file   Substance Use Topics    Alcohol use: Yes     Alcohol/week: 1.0 standard drink of alcohol     Types: 1 Shots of liquor per week    Drug use: Not on file       Physical Exam   ED Triage Vitals [04/08/24 1224]   Temperature Heart Rate Respirations BP   36.8 °C (98.2 °F) (!) 113 18 130/78      Pulse Ox Temp src Heart Rate Source Patient Position   96 % -- -- --      BP Location FiO2 (%)     -- --       Physical Exam    ED Course & MDM   Diagnoses as of 04/08/24 2056   Polysubstance abuse (CMS/[facility])       Medical Decision Making      Procedure  Procedures     Delonte Kiser DO  04/08/24 2100
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
## 2024-07-02 ENCOUNTER — CLINICAL SUPPORT (OUTPATIENT)
Dept: EMERGENCY MEDICINE | Facility: HOSPITAL | Age: 39
End: 2024-07-02
Payer: MEDICAID

## 2024-07-02 ENCOUNTER — HOSPITAL ENCOUNTER (OUTPATIENT)
Facility: HOSPITAL | Age: 39
Setting detail: OBSERVATION
Discharge: OTHER INSTITUTION | End: 2024-07-04
Attending: EMERGENCY MEDICINE | Admitting: PHYSICIAN ASSISTANT
Payer: MEDICAID

## 2024-07-02 DIAGNOSIS — R45.1 AGITATION: ICD-10-CM

## 2024-07-02 DIAGNOSIS — F20.9 SCHIZOPHRENIA, UNSPECIFIED TYPE (MULTI): Primary | ICD-10-CM

## 2024-07-02 DIAGNOSIS — F19.10 SUBSTANCE ABUSE (MULTI): ICD-10-CM

## 2024-07-02 LAB
ALBUMIN SERPL BCP-MCNC: 4.5 G/DL (ref 3.4–5)
ALP SERPL-CCNC: 69 U/L (ref 33–120)
ALT SERPL W P-5'-P-CCNC: 11 U/L (ref 10–52)
ANION GAP SERPL CALC-SCNC: 13 MMOL/L (ref 10–20)
APAP SERPL-MCNC: <10 UG/ML
APPEARANCE UR: CLEAR
AST SERPL W P-5'-P-CCNC: 19 U/L (ref 9–39)
BASOPHILS # BLD AUTO: 0.02 X10*3/UL (ref 0–0.1)
BASOPHILS NFR BLD AUTO: 0.5 %
BILIRUB SERPL-MCNC: 0.4 MG/DL (ref 0–1.2)
BILIRUB UR STRIP.AUTO-MCNC: NEGATIVE MG/DL
BUN SERPL-MCNC: 14 MG/DL (ref 6–23)
CALCIUM SERPL-MCNC: 9.1 MG/DL (ref 8.6–10.6)
CHLORIDE SERPL-SCNC: 104 MMOL/L (ref 98–107)
CO2 SERPL-SCNC: 26 MMOL/L (ref 21–32)
COLOR UR: YELLOW
CREAT SERPL-MCNC: 1.17 MG/DL (ref 0.5–1.3)
EGFRCR SERPLBLD CKD-EPI 2021: 82 ML/MIN/1.73M*2
EOSINOPHIL # BLD AUTO: 0.22 X10*3/UL (ref 0–0.7)
EOSINOPHIL NFR BLD AUTO: 5.6 %
ERYTHROCYTE [DISTWIDTH] IN BLOOD BY AUTOMATED COUNT: 11.8 % (ref 11.5–14.5)
ETHANOL SERPL-MCNC: <10 MG/DL
GLUCOSE SERPL-MCNC: 105 MG/DL (ref 74–99)
GLUCOSE UR STRIP.AUTO-MCNC: NORMAL MG/DL
HCT VFR BLD AUTO: 39 % (ref 41–52)
HGB BLD-MCNC: 13.6 G/DL (ref 13.5–17.5)
IMM GRANULOCYTES # BLD AUTO: 0 X10*3/UL (ref 0–0.7)
IMM GRANULOCYTES NFR BLD AUTO: 0 % (ref 0–0.9)
KETONES UR STRIP.AUTO-MCNC: NEGATIVE MG/DL
LEUKOCYTE ESTERASE UR QL STRIP.AUTO: NEGATIVE
LYMPHOCYTES # BLD AUTO: 2.01 X10*3/UL (ref 1.2–4.8)
LYMPHOCYTES NFR BLD AUTO: 51 %
MCH RBC QN AUTO: 28.3 PG (ref 26–34)
MCHC RBC AUTO-ENTMCNC: 34.9 G/DL (ref 32–36)
MCV RBC AUTO: 81 FL (ref 80–100)
MONOCYTES # BLD AUTO: 0.54 X10*3/UL (ref 0.1–1)
MONOCYTES NFR BLD AUTO: 13.7 %
NEUTROPHILS # BLD AUTO: 1.15 X10*3/UL (ref 1.2–7.7)
NEUTROPHILS NFR BLD AUTO: 29.2 %
NITRITE UR QL STRIP.AUTO: NEGATIVE
NRBC BLD-RTO: 0 /100 WBCS (ref 0–0)
PH UR STRIP.AUTO: 6 [PH]
PLATELET # BLD AUTO: 265 X10*3/UL (ref 150–450)
POTASSIUM SERPL-SCNC: 3.8 MMOL/L (ref 3.5–5.3)
PROT SERPL-MCNC: 7 G/DL (ref 6.4–8.2)
PROT UR STRIP.AUTO-MCNC: NEGATIVE MG/DL
RBC # BLD AUTO: 4.81 X10*6/UL (ref 4.5–5.9)
RBC # UR STRIP.AUTO: NEGATIVE /UL
SALICYLATES SERPL-MCNC: <3 MG/DL
SODIUM SERPL-SCNC: 139 MMOL/L (ref 136–145)
SP GR UR STRIP.AUTO: 1.02
UROBILINOGEN UR STRIP.AUTO-MCNC: NORMAL MG/DL
WBC # BLD AUTO: 3.9 X10*3/UL (ref 4.4–11.3)

## 2024-07-02 PROCEDURE — 96372 THER/PROPH/DIAG INJ SC/IM: CPT

## 2024-07-02 PROCEDURE — 85025 COMPLETE CBC W/AUTO DIFF WBC: CPT | Performed by: EMERGENCY MEDICINE

## 2024-07-02 PROCEDURE — 80307 DRUG TEST PRSMV CHEM ANLYZR: CPT | Performed by: EMERGENCY MEDICINE

## 2024-07-02 PROCEDURE — 36415 COLL VENOUS BLD VENIPUNCTURE: CPT | Performed by: EMERGENCY MEDICINE

## 2024-07-02 PROCEDURE — 99285 EMERGENCY DEPT VISIT HI MDM: CPT

## 2024-07-02 PROCEDURE — 2500000004 HC RX 250 GENERAL PHARMACY W/ HCPCS (ALT 636 FOR OP/ED): Mod: SE

## 2024-07-02 PROCEDURE — 81003 URINALYSIS AUTO W/O SCOPE: CPT | Performed by: EMERGENCY MEDICINE

## 2024-07-02 PROCEDURE — 84075 ASSAY ALKALINE PHOSPHATASE: CPT | Performed by: EMERGENCY MEDICINE

## 2024-07-02 PROCEDURE — 93005 ELECTROCARDIOGRAM TRACING: CPT

## 2024-07-02 PROCEDURE — 93010 ELECTROCARDIOGRAM REPORT: CPT | Performed by: EMERGENCY MEDICINE

## 2024-07-02 PROCEDURE — 80179 DRUG ASSAY SALICYLATE: CPT | Performed by: EMERGENCY MEDICINE

## 2024-07-02 PROCEDURE — 99285 EMERGENCY DEPT VISIT HI MDM: CPT | Performed by: EMERGENCY MEDICINE

## 2024-07-02 RX ORDER — MIDAZOLAM HYDROCHLORIDE 1 MG/ML
2 INJECTION INTRAMUSCULAR; INTRAVENOUS EVERY 6 HOURS PRN
Status: DISCONTINUED | OUTPATIENT
Start: 2024-07-03 | End: 2024-07-04 | Stop reason: HOSPADM

## 2024-07-02 RX ORDER — MIDAZOLAM HYDROCHLORIDE 1 MG/ML
5 INJECTION INTRAMUSCULAR; INTRAVENOUS ONCE
Status: DISCONTINUED | OUTPATIENT
Start: 2024-07-02 | End: 2024-07-02

## 2024-07-02 RX ORDER — MIDAZOLAM HYDROCHLORIDE 1 MG/ML
2 INJECTION INTRAMUSCULAR; INTRAVENOUS ONCE
Status: COMPLETED | OUTPATIENT
Start: 2024-07-02 | End: 2024-07-02

## 2024-07-02 RX ORDER — HALOPERIDOL 5 MG/ML
5 INJECTION INTRAMUSCULAR ONCE
Status: COMPLETED | OUTPATIENT
Start: 2024-07-02 | End: 2024-07-02

## 2024-07-02 RX ORDER — MIDAZOLAM HYDROCHLORIDE 5 MG/ML
INJECTION, SOLUTION INTRAMUSCULAR; INTRAVENOUS
Status: DISPENSED
Start: 2024-07-02 | End: 2024-07-03

## 2024-07-02 RX ORDER — HALOPERIDOL 5 MG/ML
5 INJECTION INTRAMUSCULAR EVERY 4 HOURS PRN
Status: DISCONTINUED | OUTPATIENT
Start: 2024-07-03 | End: 2024-07-04 | Stop reason: HOSPADM

## 2024-07-02 RX ORDER — HALOPERIDOL 5 MG/ML
2 INJECTION INTRAMUSCULAR ONCE
Status: DISCONTINUED | OUTPATIENT
Start: 2024-07-02 | End: 2024-07-02

## 2024-07-02 RX ORDER — HALOPERIDOL 5 MG/ML
INJECTION INTRAMUSCULAR
Status: DISPENSED
Start: 2024-07-02 | End: 2024-07-03

## 2024-07-02 SDOH — HEALTH STABILITY: MENTAL HEALTH: WISH TO BE DEAD (PAST 1 MONTH): NO

## 2024-07-02 SDOH — HEALTH STABILITY: MENTAL HEALTH: HAVE YOU EVER TRIED TO KILL YOURSELF?: NO

## 2024-07-02 SDOH — HEALTH STABILITY: MENTAL HEALTH: SUICIDAL BEHAVIOR (LIFETIME): NO

## 2024-07-02 SDOH — HEALTH STABILITY: MENTAL HEALTH: IN THE PAST FEW WEEKS, HAVE YOU FELT THAT YOU OR YOUR FAMILY WOULD BE BETTER OFF IF YOU WERE DEAD?: NO

## 2024-07-02 SDOH — HEALTH STABILITY: MENTAL HEALTH: DEPRESSION SYMPTOMS: NO PROBLEMS REPORTED OR OBSERVED.

## 2024-07-02 SDOH — HEALTH STABILITY: MENTAL HEALTH: IN THE PAST FEW WEEKS, HAVE YOU WISHED YOU WERE DEAD?: NO

## 2024-07-02 SDOH — HEALTH STABILITY: MENTAL HEALTH: ACTIVE SUICIDAL IDEATION WITH SPECIFIC PLAN AND INTENT (PAST 1 MONTH): NO

## 2024-07-02 SDOH — HEALTH STABILITY: MENTAL HEALTH: IN THE PAST WEEK, HAVE YOU BEEN HAVING THOUGHTS ABOUT KILLING YOURSELF?: NO

## 2024-07-02 SDOH — HEALTH STABILITY: MENTAL HEALTH: ACTIVE SUICIDAL IDEATION WITH SOME INTENT TO ACT, WITHOUT SPECIFIC PLAN (PAST 1 MONTH): NO

## 2024-07-02 SDOH — ECONOMIC STABILITY: HOUSING INSECURITY: FEELS SAFE LIVING IN HOME: YES

## 2024-07-02 SDOH — HEALTH STABILITY: MENTAL HEALTH: ARE YOU HAVING THOUGHTS OF KILLING YOURSELF RIGHT NOW?: NO

## 2024-07-02 SDOH — HEALTH STABILITY: MENTAL HEALTH: NON-SPECIFIC ACTIVE SUICIDAL THOUGHTS (PAST 1 MONTH): NO

## 2024-07-02 SDOH — HEALTH STABILITY: MENTAL HEALTH: ANXIETY SYMPTOMS: GENERALIZED

## 2024-07-02 ASSESSMENT — COLUMBIA-SUICIDE SEVERITY RATING SCALE - C-SSRS
1. IN THE PAST MONTH, HAVE YOU WISHED YOU WERE DEAD OR WISHED YOU COULD GO TO SLEEP AND NOT WAKE UP?: NO
6. HAVE YOU EVER DONE ANYTHING, STARTED TO DO ANYTHING, OR PREPARED TO DO ANYTHING TO END YOUR LIFE?: NO
2. HAVE YOU ACTUALLY HAD ANY THOUGHTS OF KILLING YOURSELF?: NO
2. HAVE YOU ACTUALLY HAD ANY THOUGHTS OF KILLING YOURSELF?: NO
1. SINCE LAST CONTACT, HAVE YOU WISHED YOU WERE DEAD OR WISHED YOU COULD GO TO SLEEP AND NOT WAKE UP?: NO
6. HAVE YOU EVER DONE ANYTHING, STARTED TO DO ANYTHING, OR PREPARED TO DO ANYTHING TO END YOUR LIFE?: NO

## 2024-07-02 ASSESSMENT — LIFESTYLE VARIABLES
PRESCIPTION_ABUSE_PAST_12_MONTHS: NO
SUBSTANCE_ABUSE_PAST_12_MONTHS: NO

## 2024-07-02 ASSESSMENT — PAIN SCALES - GENERAL: PAINLEVEL_OUTOF10: 0 - NO PAIN

## 2024-07-02 ASSESSMENT — PAIN - FUNCTIONAL ASSESSMENT: PAIN_FUNCTIONAL_ASSESSMENT: 0-10

## 2024-07-02 NOTE — ED PROVIDER NOTES
HPI:      Limitations to History: None    Additional History Obtained from: N/A  External records reviewed: prior EMR notes    Chief Complaint   Patient presents with    Psychiatric Evaluation          Jesse Rico is a 39 y.o. male with past medical history of polysubstance abuse (heroin/cocaine/marijuana abuse), bipolar disorder,  and schizophrenia presenting to the ED with acute decompensated psych.  Patient was recently in rehab, however was kicked out due to agitation and altercations with other patients in rehab.  He arrived via car via his mom and friend who notes that this has happened previously in the past.  Under the circumstances, he attends rehab, does not get his psychiatric medications and then becomes acutely agitated with acute episodes of psychosis.    On arrival to ED, he is agitated, pacing the room, he is tangential with his thought process, and is difficult to redirect.  Friend at bedside had to assist in getting patient to change into gown for mental health evaluation.  Overall, he remained agitated, and eventually required 5 mg of Haldol and 2 mg of Versed for further evaluation.      No past medical history on file.  No past surgical history on file.  Social History     Tobacco Use    Smoking status: Every Day     Current packs/day: 1.00     Types: Cigarettes   Substance Use Topics    Alcohol use: Yes     Alcohol/week: 1.0 standard drink of alcohol     Types: 1 Shots of liquor per week     Allergies   Allergen Reactions    Lactase Nausea And Vomiting and Unknown     No current facility-administered medications on file prior to encounter.     Current Outpatient Medications on File Prior to Encounter   Medication Sig    ARIPiprazole (Abilify) 10 mg tablet Take 1 tablet (10 mg) by mouth once daily in the evening.    buprenorphine-naloxone (Suboxone) 4-1 mg per sublingual film Place 1 Film under the tongue 3 times a day.    buPROPion XL (Wellbutrin XL) 150 mg 24 hr tablet Take 1 tablet (150 mg) by  mouth once daily in the morning.    busPIRone (Buspar) 5 mg tablet Take 1 tablet (5 mg) by mouth 3 times a day.    cloNIDine (Catapres) 0.1 mg tablet Take 1 tablet (0.1 mg) by mouth 3 times a day.    doxepin (SINEquan) 50 mg capsule Take 1 capsule (50 mg) by mouth once daily at bedtime.    gabapentin (Neurontin) 100 mg capsule Take 1 capsule (100 mg) by mouth 3 times a day.    haloperidol decanoate (Haldol Decanoate) 100 mg/mL injection Inject 2 mL (200 mg) into the muscle every 28 (twenty-eight) days.    hydrOXYzine pamoate (Vistaril) 25 mg capsule Take 1 capsule (25 mg) by mouth every 6 hours if needed.    ibuprofen 400 mg tablet Take 1 tablet (400 mg) by mouth every 6 hours if needed for mild pain (1 - 3).    loxapine (Loxitane) 50 mg capsule Take 2 capsules (100 mg) by mouth once daily in the evening.    mirtazapine (Remeron) 30 mg tablet Take 1 tablet (30 mg) by mouth once daily at bedtime.    omeprazole (PriLOSEC) 40 mg DR capsule Take 1 capsule (40 mg) by mouth once daily in the morning. Take before meals.    ondansetron ODT (Zofran-ODT) 4 mg disintegrating tablet Take 1 tablet (4 mg) by mouth every 8 hours if needed for nausea or vomiting.    propranolol (Inderal) 10 mg tablet Take 1 tablet (10 mg) by mouth 2 times a day.    Vitamin D3 25 mcg (1,000 unit) tablet Take 1 tablet (1,000 Units) by mouth once daily.    benztropine (Cogentin) 1 mg tablet Take 1 tablet (1 mg) by mouth every 8 hours.      --------------------------------------------------------------------------------------------------------------------------------------    VS: As documented in the triage note and EMR flowsheet from this visit were reviewed.  Temp 36.7 °C (98 °F) HR (!) 112 /84 RR 16 Sat 96 % on      Physical Exam:  GEN:  no acute distress, appears comfortable. Conversational and appropriate.    HEENT: Normocephalic, atraumatic. Conjunctiva pink with no redness or exudates. Hearing grossly intact. Moist mucous  membranes.  CARDIO: tachycardic rate and regular rhythm. Normal S1, S2  without murmurs, rubs, or gallops.   PULM: Clear to auscultation bilaterally. No rales, rhonchi, or wheezes. No accessory muscle use or stridor. Speaking in full sentences.  GI: Soft, non-tender, non-distended. No rebound tenderness or guarding.   SKIN: Warm and dry, no rashes, lesions, petechiae, or purpura.  MSK: ROM intact in all 4 extremities without contractures or pain. No peripheral edema, contusions, or wounds.    NEURO: A&Ox3, No focal findings identified. No confusion or gross mental status changes.  PSYCH: Appropriate mood and behavior, converses and responds appropriately during exam.        ---------------------------------------------------------------------------------------------------------------------------------------  Given in the ED:   ED Medication Administration from 07/02/2024 1751 to 07/03/2024 1445         Date/Time Order Dose Route Action Action by     07/02/2024 1840 EDT haloperidol lactate (Haldol) injection 2 mg -- intramuscular Canceled Entry SONAM Bowen     07/02/2024 1840 EDT haloperidol lactate (Haldol) injection 5 mg 5 mg intramuscular Given SONAM Bowen     07/02/2024 1840 EDT midazolam (Versed) injection 2 mg 2 mg intramuscular Given SONAM Bowen     07/02/2024 1840 EDT midazolam (Versed) injection 5 mg -- intramuscular Canceled Entry SONAM Bowen     07/02/2024 1840 EDT midazolam PF (Versed) injection  - Omnicell Override Pull --  Canceled Entry SONAM Bowen     07/02/2024 6952 EDT haloperidol lactate (Haldol) injection 5 mg 5 mg intramuscular Given NELL Triplett     07/02/2024 2259 EDT midazolam (Versed) injection 2 mg 2 mg intramuscular Given NELL Triplett     07/03/2024 0558 EDT haloperidol lactate (Haldol) injection 5 mg 5 mg intramuscular Given Ford      07/03/2024 0558 EDT midazolam (Versed) injection 2 mg 2 mg intramuscular Given Ford      07/03/2024 1157 EDT acetaminophen (Tylenol) tablet 975 mg 975 mg oral Not Given  [facility], R     07/03/2024 1157 EDT buprenorphine-naloxone (Suboxone) 4-1 mg per sublingual film 1 Film 1 Film sublingual Given [facility], R     07/03/2024 1157 EDT nicotine (Nicoderm CQ) 21 mg/24 hr patch 1 patch 1 patch transdermal Not Given [facility], R     07/03/2024 1412 EDT hydrOXYzine pamoate (Vistaril) capsule 25 mg 25 mg oral Given [facility], R     07/03/2024 1413 EDT benztropine (Cogentin) tablet 1 mg 1 mg oral Given [facility], R     07/03/2024 1413 EDT buPROPion XL (Wellbutrin XL) 24 hr tablet 150 mg 150 mg oral Given [facility], R     07/03/2024 1413 EDT busPIRone (Buspar) tablet 5 mg 5 mg oral Given [facility], R     07/03/2024 1413 EDT cloNIDine (Catapres) tablet 0.1 mg 0.1 mg oral Given [facility], R     07/03/2024 1413 EDT gabapentin (Neurontin) capsule 100 mg 100 mg oral Given [facility], R              Work up: All labs and imaging were independently reviewed by me.    Labs Reviewed   CBC WITH AUTO DIFFERENTIAL - Abnormal       Result Value    WBC 3.9 (*)     nRBC 0.0      RBC 4.81      Hemoglobin 13.6      Hematocrit 39.0 (*)     MCV 81      MCH 28.3      MCHC 34.9      RDW 11.8      Platelets 265      Neutrophils % 29.2      Immature Granulocytes %, Automated 0.0      Lymphocytes % 51.0      Monocytes % 13.7      Eosinophils % 5.6      Basophils % 0.5      Neutrophils Absolute 1.15 (*)     Immature Granulocytes Absolute, Automated 0.00      Lymphocytes Absolute 2.01      Monocytes Absolute 0.54      Eosinophils Absolute 0.22      Basophils Absolute 0.02     COMPREHENSIVE METABOLIC PANEL - Abnormal    Glucose 105 (*)     Sodium 139      Potassium 3.8      Chloride 104      Bicarbonate 26      Anion Gap 13      Urea Nitrogen 14      Creatinine 1.17      eGFR 82      Calcium 9.1      Albumin 4.5      Alkaline Phosphatase 69      Total Protein 7.0      AST 19      Bilirubin, Total 0.4      ALT 11     DRUG SCREEN,URINE - Abnormal    Amphetamine Screen, Urine Presumptive Negative      Barbiturate Screen, Urine Presumptive Negative       Benzodiazepines Screen, Urine Presumptive Positive (*)     Cannabinoid Screen, Urine Presumptive Negative      Cocaine Metabolite Screen, Urine Presumptive Negative      Fentanyl Screen, Urine Presumptive Negative      Opiate Screen, Urine Presumptive Negative      Oxycodone Screen, Urine Presumptive Negative      PCP Screen, Urine Presumptive Negative      Methadone Screen, Urine Presumptive Negative      Narrative:     Drug screen results are presumptive and should not be used to assess                   compliance with prescribed medication. Contact the performing [facility] laboratory                   to add-on definitive confirmatory testing if clinically indicated.                                    Toxicology screening results are reported qualitatively. The concentration must                   be greater than or equal to the cutoff to be reported as positive. The concentration                   at which the screening test can detect an individual drug or metabolite varies.                   The absence of expected drug(s) and/or drug metabolite(s) may indicate non-compliance,                   inappropriate timing of specimen collection relative to drug administration, poor drug                   absorption, diluted/adulterated urine, or limitations of testing. For medical purposes                   only; not valid for forensic use.                                    Interpretive questions should be directed to the laboratory medical directors.   DRUG SCREEN,URINE - Abnormal    Amphetamine Screen, Urine Presumptive Negative      Barbiturate Screen, Urine Presumptive Negative      Benzodiazepines Screen, Urine Presumptive Positive (*)     Cannabinoid Screen, Urine Presumptive Negative      Cocaine Metabolite Screen, Urine Presumptive Negative      Fentanyl Screen, Urine Presumptive Negative      Opiate Screen, Urine Presumptive Negative      Oxycodone Screen, Urine Presumptive Negative      PCP Screen, Urine  Presumptive Negative      Methadone Screen, Urine Presumptive Negative      Narrative:     Drug screen results are presumptive and should not be used to assess                   compliance with prescribed medication. Contact the performing [facility] laboratory                   to add-on definitive confirmatory testing if clinically indicated.                                    Toxicology screening results are reported qualitatively. The concentration must                   be greater than or equal to the cutoff to be reported as positive. The concentration                   at which the screening test can detect an individual drug or metabolite varies.                   The absence of expected drug(s) and/or drug metabolite(s) may indicate non-compliance,                   inappropriate timing of specimen collection relative to drug administration, poor drug                   absorption, diluted/adulterated urine, or limitations of testing. For medical purposes                   only; not valid for forensic use.                                    Interpretive questions should be directed to the laboratory medical directors.   URINALYSIS WITH REFLEX CULTURE AND MICROSCOPIC - Normal    Color, Urine Yellow      Appearance, Urine Clear      Specific Gravity, Urine 1.020      pH, Urine 6.0      Protein, Urine NEGATIVE      Glucose, Urine Normal      Blood, Urine NEGATIVE      Ketones, Urine NEGATIVE      Bilirubin, Urine NEGATIVE      Urobilinogen, Urine Normal      Nitrite, Urine NEGATIVE      Leukocyte Esterase, Urine NEGATIVE     ACUTE TOXICOLOGY PANEL, BLOOD - Normal    Acetaminophen <10.0      Salicylate  <3      Alcohol <10     URINALYSIS WITH REFLEX CULTURE AND MICROSCOPIC    Narrative:     The following orders were created for panel order Urinalysis with Reflex Culture and Microscopic.                  Procedure                               Abnormality         Status                                     ---------                                -----------         ------                                     Urinalysis with Reflex C...[[id number]]  Normal              Final result                               Extra Urine Gray Tube[[id number]]                            Final result                                                 Please view results for these tests on the individual orders.   EXTRA URINE GRAY TUBE    Extra Tube Hold for add-ons.         No orders to display         MDM:  Briefly, this is a 39 y.o. male who was seen here for EPAT evaluation after having been off medications while at rehab.  Patient was hemodynamically stable in the ED, however significantly agitated, perseverating, and with tangential thought.  Initially was redirectable, however became significantly upset, refusing to change.  At that time, friend who had brought patient in with mother with allowed to visit with patient, and assisted with verbal de-escalation, in addition to facilitating psychiatric procedures, such as changing into a gown.  At that time, patient told his friend to leave, and his friend was escorted out of the emergency department due to patient's wishes.  However, throughout her shift, patient became increasingly more agitated requiring 5mg Haldol and 2mg Versed IM, with notable improvement of his mental status.  Upon speaking with EPAT evaluators, he became aggressive again, requiring additional doses of Haldol and Versed IM.  EKG showing sinus tachycardia 101 bpm, normal intervals, normal axis, no ST elevations or depressions, and overall nonischemic EKG. He remained hemodynamically stable in the ED, pending further management and EPAT recommendations.      ED Course as of 07/03/24 1445   Tue Jul 02, 2024   2321 PRN medications for agitation ordered [SA]   2321 UA negative for infection, metabolic panel and CBC overall within normal limits, acute tox panel negative. [SA]   2321 Patient is medically clear [SA]      ED Course User  Index  [SA] Morenita Reece DO         Diagnoses as of 07/03/24 1445   Schizophrenia, unspecified type (Multi)     Social Determinants of Health: None identified.    Plan and Disposition: Pending EPAT to evaluate and likely place    Candice Rivas DO  Emergency Medicine, PGY-2    Patient was seen and evaluated by the attending physician. The attending ED physician agrees with the plan. Patient and/or patient´s representative was counseled regarding labs, imaging, likely diagnosis, and plan. All questions were answered.  Disclaimer: This note was dictated by speech recognition.  Attempt at proofreading was made to minimize errors.  Errors in transcription may be present.  Please call if questions.     Candice Rivas DO  Resident  07/03/24 1447

## 2024-07-03 PROBLEM — F29 PSYCHOSIS, UNSPECIFIED PSYCHOSIS TYPE (MULTI): Status: ACTIVE | Noted: 2024-07-03

## 2024-07-03 LAB
AMPHETAMINES UR QL SCN: ABNORMAL
AMPHETAMINES UR QL SCN: ABNORMAL
ATRIAL RATE: 101 BPM
BARBITURATES UR QL SCN: ABNORMAL
BARBITURATES UR QL SCN: ABNORMAL
BENZODIAZ UR QL SCN: ABNORMAL
BENZODIAZ UR QL SCN: ABNORMAL
BZE UR QL SCN: ABNORMAL
BZE UR QL SCN: ABNORMAL
CANNABINOIDS UR QL SCN: ABNORMAL
CANNABINOIDS UR QL SCN: ABNORMAL
FENTANYL+NORFENTANYL UR QL SCN: ABNORMAL
FENTANYL+NORFENTANYL UR QL SCN: ABNORMAL
HOLD SPECIMEN: NORMAL
METHADONE UR QL SCN: ABNORMAL
METHADONE UR QL SCN: ABNORMAL
OPIATES UR QL SCN: ABNORMAL
OPIATES UR QL SCN: ABNORMAL
OXYCODONE+OXYMORPHONE UR QL SCN: ABNORMAL
OXYCODONE+OXYMORPHONE UR QL SCN: ABNORMAL
P AXIS: 52 DEGREES
P OFFSET: 209 MS
P ONSET: 159 MS
PCP UR QL SCN: ABNORMAL
PCP UR QL SCN: ABNORMAL
PR INTERVAL: 124 MS
Q ONSET: 221 MS
QRS COUNT: 16 BEATS
QRS DURATION: 86 MS
QT INTERVAL: 328 MS
QTC CALCULATION(BAZETT): 425 MS
QTC FREDERICIA: 390 MS
R AXIS: 75 DEGREES
T AXIS: 51 DEGREES
T OFFSET: 385 MS
VENTRICULAR RATE: 101 BPM

## 2024-07-03 PROCEDURE — G0378 HOSPITAL OBSERVATION PER HR: HCPCS

## 2024-07-03 PROCEDURE — 2500000001 HC RX 250 WO HCPCS SELF ADMINISTERED DRUGS (ALT 637 FOR MEDICARE OP): Mod: SE | Performed by: PHYSICIAN ASSISTANT

## 2024-07-03 PROCEDURE — 2500000004 HC RX 250 GENERAL PHARMACY W/ HCPCS (ALT 636 FOR OP/ED): Mod: SE

## 2024-07-03 PROCEDURE — 96372 THER/PROPH/DIAG INJ SC/IM: CPT

## 2024-07-03 PROCEDURE — 2500000002 HC RX 250 W HCPCS SELF ADMINISTERED DRUGS (ALT 637 FOR MEDICARE OP, ALT 636 FOR OP/ED): Mod: SE | Performed by: PHYSICIAN ASSISTANT

## 2024-07-03 PROCEDURE — 80307 DRUG TEST PRSMV CHEM ANLYZR: CPT | Performed by: PHYSICIAN ASSISTANT

## 2024-07-03 RX ORDER — CLONIDINE HYDROCHLORIDE 0.1 MG/1
0.1 TABLET ORAL 3 TIMES DAILY
Status: DISCONTINUED | OUTPATIENT
Start: 2024-07-03 | End: 2024-07-04 | Stop reason: HOSPADM

## 2024-07-03 RX ORDER — MIRTAZAPINE 30 MG/1
30 TABLET, FILM COATED ORAL NIGHTLY
COMMUNITY
Start: 2024-06-27

## 2024-07-03 RX ORDER — HYDROXYZINE PAMOATE 25 MG/1
25 CAPSULE ORAL EVERY 6 HOURS PRN
Status: DISCONTINUED | OUTPATIENT
Start: 2024-07-03 | End: 2024-07-04 | Stop reason: HOSPADM

## 2024-07-03 RX ORDER — BUSPIRONE HYDROCHLORIDE 5 MG/1
5 TABLET ORAL 3 TIMES DAILY
COMMUNITY
Start: 2024-07-01

## 2024-07-03 RX ORDER — ONDANSETRON 4 MG/1
4 TABLET, ORALLY DISINTEGRATING ORAL EVERY 8 HOURS PRN
COMMUNITY
Start: 2024-06-12

## 2024-07-03 RX ORDER — ACETAMINOPHEN 325 MG/1
975 TABLET ORAL ONCE
Status: DISCONTINUED | OUTPATIENT
Start: 2024-07-03 | End: 2024-07-04 | Stop reason: HOSPADM

## 2024-07-03 RX ORDER — MICONAZOLE NITRATE 2 %
2 CREAM (GRAM) TOPICAL
Status: DISCONTINUED | OUTPATIENT
Start: 2024-07-03 | End: 2024-07-04 | Stop reason: HOSPADM

## 2024-07-03 RX ORDER — IBUPROFEN 400 MG/1
400 TABLET ORAL EVERY 6 HOURS PRN
COMMUNITY
Start: 2024-06-12

## 2024-07-03 RX ORDER — DOXEPIN HYDROCHLORIDE 25 MG/1
50 CAPSULE ORAL NIGHTLY
Status: DISCONTINUED | OUTPATIENT
Start: 2024-07-03 | End: 2024-07-04 | Stop reason: HOSPADM

## 2024-07-03 RX ORDER — CLONIDINE HYDROCHLORIDE 0.1 MG/1
0.1 TABLET ORAL 3 TIMES DAILY
COMMUNITY
Start: 2024-06-20

## 2024-07-03 RX ORDER — BUPROPION HYDROCHLORIDE 150 MG/1
150 TABLET ORAL EVERY MORNING
Status: DISCONTINUED | OUTPATIENT
Start: 2024-07-03 | End: 2024-07-04 | Stop reason: HOSPADM

## 2024-07-03 RX ORDER — BENZTROPINE MESYLATE 1 MG/1
1 TABLET ORAL EVERY 8 HOURS
COMMUNITY

## 2024-07-03 RX ORDER — GABAPENTIN 100 MG/1
100 CAPSULE ORAL 3 TIMES DAILY
Status: DISCONTINUED | OUTPATIENT
Start: 2024-07-03 | End: 2024-07-04 | Stop reason: HOSPADM

## 2024-07-03 RX ORDER — BUSPIRONE HYDROCHLORIDE 5 MG/1
5 TABLET ORAL 3 TIMES DAILY
Status: DISCONTINUED | OUTPATIENT
Start: 2024-07-03 | End: 2024-07-04 | Stop reason: HOSPADM

## 2024-07-03 RX ORDER — PROPRANOLOL HYDROCHLORIDE 10 MG/1
10 TABLET ORAL 2 TIMES DAILY
COMMUNITY
Start: 2024-06-12

## 2024-07-03 RX ORDER — BUPRENORPHINE AND NALOXONE 4; 1 MG/1; MG/1
1 FILM, SOLUBLE BUCCAL; SUBLINGUAL 3 TIMES DAILY
Status: DISCONTINUED | OUTPATIENT
Start: 2024-07-03 | End: 2024-07-04 | Stop reason: HOSPADM

## 2024-07-03 RX ORDER — DOXEPIN HYDROCHLORIDE 50 MG/1
50 CAPSULE ORAL NIGHTLY
COMMUNITY
Start: 2024-06-12

## 2024-07-03 RX ORDER — HALOPERIDOL DECANOATE 100 MG/ML
200 INJECTION INTRAMUSCULAR
COMMUNITY
Start: 2023-03-13

## 2024-07-03 RX ORDER — IBUPROFEN 200 MG
1 TABLET ORAL DAILY
Status: DISCONTINUED | OUTPATIENT
Start: 2024-07-03 | End: 2024-07-04 | Stop reason: HOSPADM

## 2024-07-03 RX ORDER — GABAPENTIN 100 MG/1
100 CAPSULE ORAL 3 TIMES DAILY
COMMUNITY
Start: 2024-06-20

## 2024-07-03 RX ORDER — BENZTROPINE MESYLATE 1 MG/1
1 TABLET ORAL EVERY 8 HOURS
Status: DISCONTINUED | OUTPATIENT
Start: 2024-07-03 | End: 2024-07-04 | Stop reason: HOSPADM

## 2024-07-03 RX ORDER — LOXAPINE SUCCINATE 50 MG/1
100 TABLET ORAL EVERY EVENING
COMMUNITY
Start: 2024-06-17

## 2024-07-03 RX ORDER — OMEPRAZOLE 40 MG/1
40 CAPSULE, DELAYED RELEASE ORAL
COMMUNITY
Start: 2024-06-12

## 2024-07-03 RX ORDER — HYDROXYZINE PAMOATE 25 MG/1
25 CAPSULE ORAL EVERY 6 HOURS PRN
COMMUNITY
Start: 2024-04-19

## 2024-07-03 RX ORDER — ARIPIPRAZOLE 5 MG/1
10 TABLET ORAL EVERY EVENING
Status: DISCONTINUED | OUTPATIENT
Start: 2024-07-03 | End: 2024-07-04 | Stop reason: HOSPADM

## 2024-07-03 RX ORDER — PROPRANOLOL HYDROCHLORIDE 10 MG/1
10 TABLET ORAL 2 TIMES DAILY
Status: DISCONTINUED | OUTPATIENT
Start: 2024-07-03 | End: 2024-07-04 | Stop reason: HOSPADM

## 2024-07-03 RX ORDER — BUPRENORPHINE AND NALOXONE 4; 1 MG/1; MG/1
1 FILM, SOLUBLE BUCCAL; SUBLINGUAL 3 TIMES DAILY
COMMUNITY
Start: 2024-06-25

## 2024-07-03 RX ORDER — BUPROPION HYDROCHLORIDE 150 MG/1
150 TABLET ORAL EVERY MORNING
COMMUNITY
Start: 2024-06-12

## 2024-07-03 RX ORDER — CHOLECALCIFEROL (VITAMIN D3) 25 MCG
1000 TABLET ORAL DAILY
COMMUNITY
Start: 2024-06-12

## 2024-07-03 RX ORDER — LOXAPINE SUCCINATE 50 MG/1
100 TABLET ORAL EVERY EVENING
Status: DISCONTINUED | OUTPATIENT
Start: 2024-07-03 | End: 2024-07-04 | Stop reason: HOSPADM

## 2024-07-03 RX ORDER — ARIPIPRAZOLE 10 MG/1
10 TABLET ORAL EVERY EVENING
COMMUNITY
Start: 2024-06-27

## 2024-07-03 RX ORDER — MIRTAZAPINE 15 MG/1
30 TABLET, FILM COATED ORAL NIGHTLY
Status: DISCONTINUED | OUTPATIENT
Start: 2024-07-03 | End: 2024-07-04 | Stop reason: HOSPADM

## 2024-07-03 NOTE — PROGRESS NOTES
I received this patient during signout from Dr. Rivas @2200   Please see previous provider's note for detailed H&P, labs and imaging.    Briefly, this is a 39-year-old male who presented for psychiatric evaluation.    Plan at the time of signout was await EPAT recommendations and disposition patient.    Under my care, patient reassessed and remains clinically stable. See ED course below.     @  ED Course as of 07/03/24 0656 Tue Jul 02, 2024   2321 PRN medications for agitation ordered [SA]   2321 UA negative for infection, metabolic panel and CBC overall within normal limits, acute tox panel negative. [SA]   2321 Patient is medically clear [SA]      ED Course User Index  [SA] Morenita Reece DO         Diagnoses as of 07/03/24 0656   Schizophrenia, unspecified type (Multi)   @    Disposition: Signed out pending EPAT evaluation      Patient seen and staffed with attending physician.     Morenita Reece DO   EM PGY3

## 2024-07-03 NOTE — SIGNIFICANT EVENT
Application for Emergency Admission      Ready for Transfer?  Is the patient medically cleared for transfer to inpatient psychiatry: Yes  Has the patient been accepted to an inpatient psychiatric hospital: Yes    Application for Emergency Admission  IN ACCORDANCE WITH [place]  The Chief Clinical Officer of: Dr. Paulo Pal 7/3/2024 .1:10 PM    Reason for Hospitalization  The undersigned has reason to believe that: Jesse Rico Is a mentally ill person subject to hospitalization by court order under division B Section 5122.01 of the Revised Code, i.e., this person:    1.No  Represents a substantial risk of physical harm to self as manifested by evidence of threats of, or attempts at, suicide or serious self-inflicted bodily harm    2.Yes Represents a substantial risk of physical harm to others as manifested by evidence of recent homicidal or other violent behavior, evidence of recent threats that place another in reasonable fear of violent behavior and serious physical harm, or other evidence of present dangerousness    3.Yes Represents a substantial and immediate risk of serious physical impairment or injury to self as manifested by  evidence that the person is unable to provide for and is not providing for the person's basic physical needs because of the person's mental illness and that appropriate provision for those needs cannot be made  immediately available in the community    4.Yes Would benefit from treatment in a hospital for his mental illness and is in need of such treatment as manifested by evidence of behavior that creates a grave and imminent risk to substantial rights of others or  himself.    5.Yes Would benefit from treatment as manifested by evidence of behavior that indicates all of the following:       (a) The person is unlikely to survive safely in the community without supervision, based on a clinical determination.       (b) The person has a history of lack of compliance  with treatment for mental illness and one of the following applies:      (i) At least twice within the thirty-six months prior to the filing of an affidavit seeking court-ordered treatment of the person under section 5122.111 of the Revised Code, the lack of compliance has been a significant factor in necessitating hospitalization in a hospital or receipt of services in a forensic or other mental health unit of a correctional facility, provided that the thirty-six-month period shall be extended by the length of any hospitalization or incarceration of the person that occurred within the thirty-six-month period.      (ii) Within the forty-eight months prior to the filing of an affidavit seeking court-ordered treatment of the person under section 5122.111 of the Revised Code, the lack of compliance resulted in one or more acts of serious violent behavior toward self or others or threats of, or attempts at, serious physical harm to self or others, provided that the forty-eight-month period shall be extended by the length of any hospitalization or incarceration of the person that occurred within the forty-eight-month period.      (c) The person, as a result of mental illness, is unlikely to voluntarily participate in necessary treatment.       (d) In view of the person's treatment history and current behavior, the person is in need of treatment in order to prevent a relapse or deterioration that would be likely to result in substantial risk of serious harm to the person or others.    (e) Represents a substantial risk of physical harm to self or others if allowed to remain at liberty pending examination.    Therefore, it is requested that said person be admitted to the above named facility.    STATEMENT OF BELIEF    Must be filled out by one of the following: a psychiatrist, licensed physician, licensed clinical psychologist, health or ,  or .  (Statement shall include the circumstances  under which the individual was taken into custody and the reason for the person's belief that hospitalization is necessary. The statement shall also include a reference to efforts made to secure the individual's property at his residence if he was taken into custody there. Every reasonable and appropriate effort should be made to take this person into custody in the least conspicuous manner possible.)    Mr. Hamm would benefit from inpatient psychiatric care due to his decompensated schizophrenia secondary to medication noncompliance.  He was acting out at his rehab facility and becoming violent/aggressive towards others and I have concern for the patient's as well as other people safety if he does not receive the appropriate psychiatric care.  Additionally patient appears to have disorganized behavior and did not appear to be caring for his daily needs.      Maritza White PA-C 7/3/2024     _____________________________________________________________   Place of Employment: [facility]    STATEMENT OF OBSERVATION BY PSYCHIATRIST, LICENSED PHYSICIAN, OR LICENSED CLINICAL PSYCHOLOGIST, IF APPLICABLE    Place of Observation (e.g., [facility] mental [facility] center, general hospital, office, emergency facility)  (If applicable, please complete)    Maritza White PA-C 7/3/2024    _____________________________________________________________

## 2024-07-03 NOTE — H&P
"Observation History and Physical  [facility] EMERGENCY MEDICINE           History of Present Illness     History provided by: Patient and Nursing Staff  Limitations to History: Mental Illness  External Records Reviewed: yes      Patient History:  Jesse Rico is a 39 y.o. male with past medical history of schizoaffective disorder as well as polysubstance use who presented to the emergency department for erratic behaviors at rehab.  Patient went to rehab in April.  Patient's mother had reported that he was not receiving his long-acting injectable.  Patient was reportedly having confrontations with other residents, has been losing weight,, and had not been adequately caring for self.  No reported SI or HI.    Jesse Rico was escalated to observation status. Observation was necessary as they continue to require treatment and monitoring of their psychiatric illness while awaiting inpatient behavioral health bed availability.  He had been seen by previous team as well as EPAT who did recommend inpatient psychiatric placement and we are currently awaiting his inpatient behavioral health bed at this time.    Physical Exam     Visit Vitals  /76   Pulse 61   Temp 36.7 °C (98 °F)   Resp 20   Ht 1.727 m (5' 8\")   Wt 65.8 kg (145 lb)   SpO2 98%   BMI 22.05 kg/m²   Smoking Status Every Day   BSA 1.78 m²       GENERAL:  The patient appears nourished and normally developed. Vital signs as documented.     PULMONARY:  Without any respiratory distress. Able to speak full sentences, no accessory muscle use    CARDIAC: Warm and well perfused. No cyanosis.    MUSCULOSKELETAL:   Able to ambulate, Non edematous, with no obvious deformities.     SKIN: No pallor. Intact.    NEURO:  No obvious neurological deficits.  Able to follow commands.    Psych: Calm and cooperative, however disorganized thought and speech.      Impression and Plan     Jesse Rico under observation status in [facility] EMERGENCY " MEDICINE for psychiatric illness monitoring and treatment while awaiting inpatient behavioral health bed availability.   Emergency Psychiatric Assessment Team has been consulted. Case discussed with them and decision for inpatient hospitalization deemed necessary. Patient is medically clear at this time. Home medications reviewed and restarted where clinically indicated. Patient and Family updated on plan of care.     Maritza White PA-C

## 2024-07-03 NOTE — PROGRESS NOTES
Pharmacy Medication History Review    Jesse Rico is a 39 y.o. male admitted for Psychosis, unspecified psychosis type (Multi). Pharmacy reviewed the patient's [id number] medications and allergies for accuracy.    The list below reflects the updated PTA list. Comments regarding how patient may be taking medications differently can be found in the Admit Orders Activity  Prior to Admission Medications   Prescriptions Last Dose Informant   ARIPiprazole (Abilify) 10 mg tablet  Other   Sig: Take 1 tablet (10 mg) by mouth once daily in the evening.   Vitamin D3 25 mcg (1,000 unit) tablet  Other   Sig: Take 1 tablet (1,000 Units) by mouth once daily.   benztropine (Cogentin) 1 mg tablet  Other   Sig: Take 1 tablet (1 mg) by mouth every 8 hours.   buPROPion XL (Wellbutrin XL) 150 mg 24 hr tablet  Other   Sig: Take 1 tablet (150 mg) by mouth once daily in the morning.   buprenorphine-naloxone (Suboxone) 4-1 mg per sublingual film  Other   Sig: Place 1 Film under the tongue 3 times a day.   busPIRone (Buspar) 5 mg tablet  Other   Sig: Take 1 tablet (5 mg) by mouth 3 times a day.   cloNIDine (Catapres) 0.1 mg tablet  Other   Sig: Take 1 tablet (0.1 mg) by mouth 3 times a day.   doxepin (SINEquan) 50 mg capsule  Other   Sig: Take 1 capsule (50 mg) by mouth once daily at bedtime.   gabapentin (Neurontin) 100 mg capsule  Other   Sig: Take 1 capsule (100 mg) by mouth 3 times a day.   haloperidol decanoate (Haldol Decanoate) 100 mg/mL injection  Other   Sig: Inject 2 mL (200 mg) into the muscle every 28 (twenty-eight) days.  *Last filled 5/2024 for 30 days (Not filled by pharmacy who was providing medications to [facility])   hydrOXYzine pamoate (Vistaril) 25 mg capsule  Other   Sig: Take 1 capsule (25 mg) by mouth every 6 hours if needed.   ibuprofen 400 mg tablet  Other   Sig: Take 1 tablet (400 mg) by mouth every 6 hours if needed for mild pain (1 - 3).   loxapine (Loxitane) 50 mg capsule  Other   Sig: Take 2  capsules (100 mg) by mouth once daily in the evening.   mirtazapine (Remeron) 30 mg tablet  Other   Sig: Take 1 tablet (30 mg) by mouth once daily at bedtime.   omeprazole (PriLOSEC) 40 mg DR capsule  Other   Sig: Take 1 capsule (40 mg) by mouth once daily in the morning. Take before meals.   ondansetron ODT (Zofran-ODT) 4 mg disintegrating tablet  Other   Sig: Take 1 tablet (4 mg) by mouth every 8 hours if needed for nausea or vomiting.   propranolol (Inderal) 10 mg tablet  Other   Sig: Take 1 tablet (10 mg) by mouth 2 times a day.      Facility-Administered Medications: None        The list below reflects the updated allergy list. Please review each documented allergy for additional clarification and justification.  Allergies  Reviewed by oD Peguero RN on 7/2/2024        Severity Reactions Comments    Lactase Not Specified Nausea And Vomiting, Unknown             Patient was unable to be assessed for M2B at discharge.     Sources used to complete the med history include: Pharmacy - [facility] ([phone]) and [facility] ([phone])/ O[facility]RS    Below are additional concerns with the patient's PTA list.  Patient was in rehab at [facility]. Called facility to get list of medications but was unable to speak with anyone who could provide list.   Called pharmacy with most recent OARRS dispenses ([facility]) who supplies medications for rehab. Spoke with technician who provided list of all medications that have been recently filled for patient.   Also called [facility] who had filled haloperidol decanoate 200mg IM once a month, but that was back in May 2024. They do not fill prescriptions for [facility].     Melisa Brown PharmD  Transitions of Care Pharmacist  [facility]  Please reach out via Secure Chat for questions, or if no response call [facility] or [facility]
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
## 2024-07-04 VITALS
HEART RATE: 94 BPM | OXYGEN SATURATION: 99 % | HEIGHT: 68 IN | TEMPERATURE: 97.8 F | RESPIRATION RATE: 16 BRPM | WEIGHT: 145 LBS | SYSTOLIC BLOOD PRESSURE: 115 MMHG | DIASTOLIC BLOOD PRESSURE: 83 MMHG | BODY MASS INDEX: 21.98 KG/M2

## 2024-07-04 PROCEDURE — G0378 HOSPITAL OBSERVATION PER HR: HCPCS

## 2024-07-04 SDOH — HEALTH STABILITY: MENTAL HEALTH: SLEEP PATTERN: NAPS DURING THE DAY

## 2024-07-04 SDOH — HEALTH STABILITY: MENTAL HEALTH: CONTENT: PREOCCUPATION

## 2024-07-04 SDOH — HEALTH STABILITY: MENTAL HEALTH: BEHAVIORS/MOOD: PACING;FLIGHT OF IDEAS

## 2024-07-04 SDOH — HEALTH STABILITY: MENTAL HEALTH: DELUSIONS: PARANOID

## 2024-07-04 NOTE — PROGRESS NOTES
Pharmacy Admission Order Reconciliation Review    Jesse Rico is a 39 y.o. male admitted for Psychosis, unspecified psychosis type (Multi). Pharmacy reviewed the patient's unreconciled admission medications.    Prior to admission medications that were reviewed and acted on by the pharmacist include:  Vit D3  Omeprazole  Ibuprofen  Zofran prn  Naloxone sl film   Haldol decanate inj, monthly  These medications have been reconciled.     Any other unreconcilied medications have been addressed and will be ordered or held by the patient's medical team. Medications addressed by the pharmacist may be added or changed by the patient's medical team at any time.    Gretchen Paul, PharmD  Transitions of Care Pharmacist  [facility]  Please reach out via Secure Chat for questions

## 2024-08-23 ENCOUNTER — APPOINTMENT (OUTPATIENT)
Dept: CARDIOLOGY | Facility: HOSPITAL | Age: 39
End: 2024-08-23
Payer: MEDICAID

## 2024-08-23 ENCOUNTER — APPOINTMENT (OUTPATIENT)
Dept: RADIOLOGY | Facility: HOSPITAL | Age: 39
End: 2024-08-23
Payer: MEDICAID

## 2024-08-23 ENCOUNTER — HOSPITAL ENCOUNTER (EMERGENCY)
Facility: HOSPITAL | Age: 39
Discharge: OTHER INSTITUTION | End: 2024-08-24
Attending: EMERGENCY MEDICINE
Payer: MEDICAID

## 2024-08-23 DIAGNOSIS — R46.89 AGGRESSIVE BEHAVIOR: Primary | ICD-10-CM

## 2024-08-23 LAB
ALBUMIN SERPL BCP-MCNC: 4.1 G/DL (ref 3.4–5)
ALP SERPL-CCNC: 66 U/L (ref 33–120)
ALT SERPL W P-5'-P-CCNC: 13 U/L (ref 10–52)
AMPHETAMINES UR QL SCN: NORMAL
ANION GAP SERPL CALC-SCNC: 13 MMOL/L (ref 10–20)
APAP SERPL-MCNC: <10 UG/ML
AST SERPL W P-5'-P-CCNC: 18 U/L (ref 9–39)
ATRIAL RATE: 80 BPM
BARBITURATES UR QL SCN: NORMAL
BASOPHILS # BLD AUTO: 0.03 X10*3/UL (ref 0–0.1)
BASOPHILS NFR BLD AUTO: 0.4 %
BENZODIAZ UR QL SCN: NORMAL
BILIRUB SERPL-MCNC: 0.6 MG/DL (ref 0–1.2)
BUN SERPL-MCNC: 5 MG/DL (ref 6–23)
BZE UR QL SCN: NORMAL
CALCIUM SERPL-MCNC: 8.9 MG/DL (ref 8.6–10.3)
CANNABINOIDS UR QL SCN: NORMAL
CHLORIDE SERPL-SCNC: 104 MMOL/L (ref 98–107)
CK SERPL-CCNC: 812 U/L (ref 0–325)
CK SERPL-CCNC: 871 U/L (ref 0–325)
CO2 SERPL-SCNC: 25 MMOL/L (ref 21–32)
CREAT SERPL-MCNC: 1.06 MG/DL (ref 0.5–1.3)
EGFRCR SERPLBLD CKD-EPI 2021: >90 ML/MIN/1.73M*2
EOSINOPHIL # BLD AUTO: 0.04 X10*3/UL (ref 0–0.7)
EOSINOPHIL NFR BLD AUTO: 0.6 %
ERYTHROCYTE [DISTWIDTH] IN BLOOD BY AUTOMATED COUNT: 13.2 % (ref 11.5–14.5)
ETHANOL SERPL-MCNC: <10 MG/DL
FENTANYL+NORFENTANYL UR QL SCN: NORMAL
GLUCOSE SERPL-MCNC: 80 MG/DL (ref 74–99)
HCT VFR BLD AUTO: 38.5 % (ref 41–52)
HGB BLD-MCNC: 12.9 G/DL (ref 13.5–17.5)
IMM GRANULOCYTES # BLD AUTO: 0.03 X10*3/UL (ref 0–0.7)
IMM GRANULOCYTES NFR BLD AUTO: 0.4 % (ref 0–0.9)
LYMPHOCYTES # BLD AUTO: 0.91 X10*3/UL (ref 1.2–4.8)
LYMPHOCYTES NFR BLD AUTO: 13 %
MCH RBC QN AUTO: 28 PG (ref 26–34)
MCHC RBC AUTO-ENTMCNC: 33.5 G/DL (ref 32–36)
MCV RBC AUTO: 84 FL (ref 80–100)
METHADONE UR QL SCN: NORMAL
MONOCYTES # BLD AUTO: 0.89 X10*3/UL (ref 0.1–1)
MONOCYTES NFR BLD AUTO: 12.8 %
NEUTROPHILS # BLD AUTO: 5.08 X10*3/UL (ref 1.2–7.7)
NEUTROPHILS NFR BLD AUTO: 72.8 %
NRBC BLD-RTO: 0 /100 WBCS (ref 0–0)
OPIATES UR QL SCN: NORMAL
OXYCODONE+OXYMORPHONE UR QL SCN: NORMAL
P AXIS: 72 DEGREES
P OFFSET: 193 MS
P ONSET: 138 MS
PCP UR QL SCN: NORMAL
PLATELET # BLD AUTO: 234 X10*3/UL (ref 150–450)
POTASSIUM SERPL-SCNC: 3.3 MMOL/L (ref 3.5–5.3)
PR INTERVAL: 158 MS
PROT SERPL-MCNC: 7 G/DL (ref 6.4–8.2)
Q ONSET: 217 MS
QRS COUNT: 13 BEATS
QRS DURATION: 86 MS
QT INTERVAL: 368 MS
QTC CALCULATION(BAZETT): 424 MS
QTC FREDERICIA: 405 MS
R AXIS: 92 DEGREES
RBC # BLD AUTO: 4.6 X10*6/UL (ref 4.5–5.9)
SALICYLATES SERPL-MCNC: <3 MG/DL
SODIUM SERPL-SCNC: 139 MMOL/L (ref 136–145)
T AXIS: 61 DEGREES
T OFFSET: 401 MS
VENTRICULAR RATE: 80 BPM
WBC # BLD AUTO: 7 X10*3/UL (ref 4.4–11.3)

## 2024-08-23 PROCEDURE — 73610 X-RAY EXAM OF ANKLE: CPT | Mod: BILATERAL PROCEDURE | Performed by: RADIOLOGY

## 2024-08-23 PROCEDURE — 72170 X-RAY EXAM OF PELVIS: CPT

## 2024-08-23 PROCEDURE — 85025 COMPLETE CBC W/AUTO DIFF WBC: CPT | Performed by: EMERGENCY MEDICINE

## 2024-08-23 PROCEDURE — 70450 CT HEAD/BRAIN W/O DYE: CPT

## 2024-08-23 PROCEDURE — 99285 EMERGENCY DEPT VISIT HI MDM: CPT | Mod: 25 | Performed by: EMERGENCY MEDICINE

## 2024-08-23 PROCEDURE — 93005 ELECTROCARDIOGRAM TRACING: CPT

## 2024-08-23 PROCEDURE — 80053 COMPREHEN METABOLIC PANEL: CPT | Performed by: EMERGENCY MEDICINE

## 2024-08-23 PROCEDURE — 73610 X-RAY EXAM OF ANKLE: CPT | Mod: 50

## 2024-08-23 PROCEDURE — 96372 THER/PROPH/DIAG INJ SC/IM: CPT

## 2024-08-23 PROCEDURE — 2500000004 HC RX 250 GENERAL PHARMACY W/ HCPCS (ALT 636 FOR OP/ED): Mod: JZ

## 2024-08-23 PROCEDURE — 80143 DRUG ASSAY ACETAMINOPHEN: CPT | Performed by: EMERGENCY MEDICINE

## 2024-08-23 PROCEDURE — 71045 X-RAY EXAM CHEST 1 VIEW: CPT | Mod: FOREIGN READ | Performed by: RADIOLOGY

## 2024-08-23 PROCEDURE — 72170 X-RAY EXAM OF PELVIS: CPT | Mod: FOREIGN READ | Performed by: RADIOLOGY

## 2024-08-23 PROCEDURE — 82550 ASSAY OF CK (CPK): CPT | Performed by: EMERGENCY MEDICINE

## 2024-08-23 PROCEDURE — 71045 X-RAY EXAM CHEST 1 VIEW: CPT

## 2024-08-23 PROCEDURE — 70450 CT HEAD/BRAIN W/O DYE: CPT | Performed by: RADIOLOGY

## 2024-08-23 PROCEDURE — 36415 COLL VENOUS BLD VENIPUNCTURE: CPT | Performed by: EMERGENCY MEDICINE

## 2024-08-23 PROCEDURE — 80307 DRUG TEST PRSMV CHEM ANLYZR: CPT | Performed by: EMERGENCY MEDICINE

## 2024-08-23 RX ORDER — LORAZEPAM 2 MG/ML
INJECTION INTRAMUSCULAR
Status: COMPLETED
Start: 2024-08-23 | End: 2024-08-23

## 2024-08-23 RX ORDER — DIVALPROEX SODIUM 500 MG/1
500 TABLET, FILM COATED, EXTENDED RELEASE ORAL DAILY
COMMUNITY

## 2024-08-23 RX ORDER — OLANZAPINE 10 MG/2ML
10 INJECTION, POWDER, FOR SOLUTION INTRAMUSCULAR ONCE
Status: COMPLETED | OUTPATIENT
Start: 2024-08-23 | End: 2024-08-23

## 2024-08-23 RX ORDER — DIVALPROEX SODIUM 500 MG/1
2 TABLET, FILM COATED, EXTENDED RELEASE ORAL 2 TIMES DAILY
COMMUNITY

## 2024-08-23 RX ORDER — TRAZODONE HYDROCHLORIDE 100 MG/1
100 TABLET ORAL NIGHTLY
COMMUNITY

## 2024-08-23 RX ORDER — METOPROLOL TARTRATE 25 MG/1
0.5 TABLET, FILM COATED ORAL 2 TIMES DAILY PRN
COMMUNITY

## 2024-08-23 RX ORDER — OLANZAPINE 10 MG/2ML
INJECTION, POWDER, FOR SOLUTION INTRAMUSCULAR
Status: DISPENSED
Start: 2024-08-23 | End: 2024-08-23

## 2024-08-23 RX ORDER — LORAZEPAM 2 MG/ML
2 INJECTION INTRAMUSCULAR ONCE
Status: COMPLETED | OUTPATIENT
Start: 2024-08-23 | End: 2024-08-23

## 2024-08-23 RX ORDER — OLANZAPINE 10 MG/2ML
INJECTION, POWDER, FOR SOLUTION INTRAMUSCULAR
Status: COMPLETED
Start: 2024-08-23 | End: 2024-08-23

## 2024-08-23 RX ADMIN — OLANZAPINE 10 MG: 10 INJECTION, POWDER, FOR SOLUTION INTRAMUSCULAR at 08:04

## 2024-08-23 RX ADMIN — LORAZEPAM 2 MG: 2 INJECTION INTRAMUSCULAR at 08:04

## 2024-08-23 RX ADMIN — LORAZEPAM 2 MG: 2 INJECTION, SOLUTION INTRAMUSCULAR; INTRAVENOUS at 08:04

## 2024-08-23 SDOH — HEALTH STABILITY: MENTAL HEALTH: HAVE YOU ACTUALLY HAD ANY THOUGHTS OF KILLING YOURSELF?: NO

## 2024-08-23 SDOH — HEALTH STABILITY: MENTAL HEALTH: SLEEP PATTERN: UNABLE TO ASSESS

## 2024-08-23 SDOH — HEALTH STABILITY: MENTAL HEALTH: HALLUCINATION: UNABLE TO ASSESS

## 2024-08-23 SDOH — HEALTH STABILITY: MENTAL HEALTH: DELUSIONS: OTHER (COMMENT)

## 2024-08-23 SDOH — HEALTH STABILITY: MENTAL HEALTH

## 2024-08-23 SDOH — HEALTH STABILITY: MENTAL HEALTH: HAVE YOU EVER DONE ANYTHING, STARTED TO DO ANYTHING, OR PREPARED TO DO ANYTHING TO END YOUR LIFE?: NO

## 2024-08-23 SDOH — HEALTH STABILITY: MENTAL HEALTH: IN THE PAST FEW WEEKS, HAVE YOU FELT THAT YOU OR YOUR FAMILY WOULD BE BETTER OFF IF YOU WERE DEAD?: YES

## 2024-08-23 SDOH — HEALTH STABILITY: MENTAL HEALTH: HAVE YOU EVER TRIED TO KILL YOURSELF?: YES

## 2024-08-23 SDOH — HEALTH STABILITY: MENTAL HEALTH: BEHAVIORS/MOOD: RESTLESS

## 2024-08-23 SDOH — HEALTH STABILITY: MENTAL HEALTH: BEHAVIORS/MOOD: SLEEPING

## 2024-08-23 SDOH — HEALTH STABILITY: MENTAL HEALTH
BEHAVIORS/MOOD: AGITATED;APPEARS INTOXICATED;UNCOOPERATIVE;NON-COMPLIANT;RESTLESS;IRRITABLE;AGGRESSIVE PHYSICALLY, OTHERS;AGGRESSIVE PHYSICALLY, SELF;AGGRESSIVE VERBALLY, OTHERS

## 2024-08-23 SDOH — HEALTH STABILITY: MENTAL HEALTH: SUICIDE ASSESSMENT: ADULT (C-SSRS)

## 2024-08-23 SDOH — HEALTH STABILITY: MENTAL HEALTH: CONTENT: UNABLE TO ASSESS

## 2024-08-23 SDOH — HEALTH STABILITY: MENTAL HEALTH: SUICIDAL BEHAVIOR (3 MONTHS): NO

## 2024-08-23 SDOH — HEALTH STABILITY: MENTAL HEALTH: DEPRESSION SYMPTOMS: LOSS OF INTEREST

## 2024-08-23 SDOH — HEALTH STABILITY: MENTAL HEALTH: NEEDS EXPRESSED: EMOTIONAL

## 2024-08-23 SDOH — HEALTH STABILITY: MENTAL HEALTH: HAVE YOU WISHED YOU WERE DEAD OR WISHED YOU COULD GO TO SLEEP AND NOT WAKE UP?: NO

## 2024-08-23 SDOH — HEALTH STABILITY: MENTAL HEALTH: ARE YOU HAVING THOUGHTS OF KILLING YOURSELF RIGHT NOW?: NO

## 2024-08-23 SDOH — HEALTH STABILITY: MENTAL HEALTH: IN THE PAST FEW WEEKS, HAVE YOU WISHED YOU WERE DEAD?: YES

## 2024-08-23 SDOH — HEALTH STABILITY: MENTAL HEALTH: WISH TO BE DEAD (PAST 1 MONTH): YES

## 2024-08-23 SDOH — HEALTH STABILITY: MENTAL HEALTH: NON-SPECIFIC ACTIVE SUICIDAL THOUGHTS (PAST 1 MONTH): YES

## 2024-08-23 SDOH — HEALTH STABILITY: MENTAL HEALTH: ANXIETY SYMPTOMS: GENERALIZED

## 2024-08-23 SDOH — ECONOMIC STABILITY: HOUSING INSECURITY: FEELS SAFE LIVING IN HOME: YES

## 2024-08-23 SDOH — HEALTH STABILITY: MENTAL HEALTH: SLEEP PATTERN: RESTLESSNESS

## 2024-08-23 SDOH — HEALTH STABILITY: MENTAL HEALTH
BEHAVIORS/MOOD: AGITATED;UNCOOPERATIVE;NON-COMPLIANT;RESTLESS;IRRITABLE;AGGRESSIVE PHYSICALLY, OTHERS;AGGRESSIVE PHYSICALLY, SELF;AGGRESSIVE VERBALLY, OTHERS

## 2024-08-23 SDOH — HEALTH STABILITY: MENTAL HEALTH: SUICIDAL BEHAVIOR (LIFETIME): YES

## 2024-08-23 SDOH — HEALTH STABILITY: MENTAL HEALTH: ACTIVE SUICIDAL IDEATION WITH SOME INTENT TO ACT, WITHOUT SPECIFIC PLAN (PAST 1 MONTH): YES

## 2024-08-23 SDOH — HEALTH STABILITY: MENTAL HEALTH: ACTIVE SUICIDAL IDEATION WITH SPECIFIC PLAN AND INTENT (PAST 1 MONTH): NO

## 2024-08-23 ASSESSMENT — COLUMBIA-SUICIDE SEVERITY RATING SCALE - C-SSRS
1. IN THE PAST MONTH, HAVE YOU WISHED YOU WERE DEAD OR WISHED YOU COULD GO TO SLEEP AND NOT WAKE UP?: YES
5. HAVE YOU STARTED TO WORK OUT OR WORKED OUT THE DETAILS OF HOW TO KILL YOURSELF? DO YOU INTEND TO CARRY OUT THIS PLAN?: NO
6. HAVE YOU EVER DONE ANYTHING, STARTED TO DO ANYTHING, OR PREPARED TO DO ANYTHING TO END YOUR LIFE?: YES
1. SINCE LAST CONTACT, HAVE YOU WISHED YOU WERE DEAD OR WISHED YOU COULD GO TO SLEEP AND NOT WAKE UP?: YES
4. HAVE YOU HAD THESE THOUGHTS AND HAD SOME INTENTION OF ACTING ON THEM?: YES
2. HAVE YOU ACTUALLY HAD ANY THOUGHTS OF KILLING YOURSELF?: YES
2. HAVE YOU ACTUALLY HAD ANY THOUGHTS OF KILLING YOURSELF?: NO

## 2024-08-23 ASSESSMENT — LIFESTYLE VARIABLES
SUBSTANCE_ABUSE_PAST_12_MONTHS: YES
PRESCIPTION_ABUSE_PAST_12_MONTHS: YES

## 2024-08-23 ASSESSMENT — ACTIVITIES OF DAILY LIVING (ADL): LACK_OF_TRANSPORTATION: NO

## 2024-08-23 NOTE — PROGRESS NOTES
Pharmacy Medication History Review    [facility] and [facility] pharmacy.     Jesse Rico is a 39 y.o. male admitted for No Principal Problem: There is no principal problem currently on the Problem List. Please update the Problem List and refresh.. Pharmacy reviewed the patient's oubsv-ci-[id number] medications and allergies for accuracy.    The list below reflectives the updated PTA list. Please review each medication in order reconciliation for additional clarification and justification.       The list below reflectives the updated allergy list. Please review each documented allergy for additional clarification and justification.  Allergies  Reviewed by Clint Galindo RN on 8/23/2024        Severity Reactions Comments    Lactase Not Specified Nausea And Vomiting, Unknown             Below are additional concerns with the patient's PTA list.  Prior to Admission Medications   Prescriptions Last Dose Informant   ARIPiprazole (Abilify) 10 mg tablet  Other   Sig: Take 1 tablet (10 mg) by mouth once daily in the evening.   Vitamin D3 25 mcg (1,000 unit) tablet  Other   Sig: Take 1 tablet (1,000 Units) by mouth once daily.   benztropine (Cogentin) 1 mg tablet  Other   Sig: Take 1 tablet (1 mg) by mouth every 8 hours.   buPROPion XL (Wellbutrin XL) 150 mg 24 hr tablet  Other   Sig: Take 1 tablet (150 mg) by mouth once daily in the morning.   buprenorphine-naloxone (Suboxone) 2-0.5 mg per sublingual film  Other   Sig: Place 1 Film under the tongue 2 times a day.   busPIRone (Buspar) 5 mg tablet  Other   Sig: Take 1 tablet (5 mg) by mouth 3 times a day.   cloNIDine (Catapres) 0.1 mg tablet  Other   Sig: Take 1 tablet (0.1 mg) by mouth 3 times a day.   divalproex (Depakote ER) 500 mg 24 hr tablet     Sig: Take 1 tablet (500 mg) by mouth once daily. Do not crush, chew, or split. In the morning   divalproex (Depakote ER) 500 mg 24 hr tablet     Sig: Take 2 tablets (1,000 mg) by mouth 2 times a day. At 2pm and 9pm    gabapentin (Neurontin) 100 mg capsule  Other   Sig: Take 1 capsule (100 mg) by mouth 3 times a day.   haloperidol decanoate (Haldol Decanoate) 100 mg/mL injection  Other   Sig: Inject 2 mL (200 mg) into the muscle every 28 (twenty-eight) days.   hydrOXYzine pamoate (Vistaril) 25 mg capsule  Other   Sig: Take 1 capsule (25 mg) by mouth every 6 hours.   ibuprofen 400 mg tablet  Other   Sig: Take 1 tablet (400 mg) by mouth every 6 hours if needed for mild pain (1 - 3).   loxapine (Loxitane) 50 mg capsule  Other   Sig: Take 1 capsule (50 mg) by mouth 2 times a day.   metoprolol tartrate (Lopressor) 25 mg tablet     Sig: Take 0.5 tablets (12.5 mg) by mouth 2 times a day as needed (heart rate).   mirtazapine (Remeron) 30 mg tablet  Other   Sig: Take 1 tablet (30 mg) by mouth once daily at bedtime.   omeprazole (PriLOSEC) 40 mg DR capsule  Other   Sig: Take 1 capsule (40 mg) by mouth once daily in the morning. Take before meals.   ondansetron ODT (Zofran-ODT) 4 mg disintegrating tablet  Other   Sig: Take 1 tablet (4 mg) by mouth every 8 hours if needed for nausea or vomiting.   propranolol (Inderal) 10 mg tablet  Other   Sig: Take 1 tablet (10 mg) by mouth 2 times a day.   traZODone (Desyrel) 100 mg tablet     Sig: Take 1 tablet (100 mg) by mouth once daily at bedtime.      Facility-Administered Medications: None        Carrol Hickman

## 2024-08-23 NOTE — ED PROVIDER NOTES
HPI   Chief Complaint   Patient presents with    Suicidal     Pt came in via EMS for possible suicide attempt or overdose. According to his grandmother he possibly took too many of his buspirone medication. Pt is incomprehensible at this time and not following commands. He has had aggressive and inappropriate behavior with staff.        39-year-old man history of bipolar disorder and polysubstance abuse brought in by [facility] for erratic behavior.  Grandmother called due to concern that he was off his meds.   he reports he smoked some weed with foil that may have had another street drug in it.  Police report they saw him on a second floor balcony and he jumped off of the balcony onto concrete but bounced right back up and has been walking without any complaints since then.  Pt unable to provide additional history currently due to difficulty to understand/mumbling.               Patient History   No past medical history on file.  No past surgical history on file.  No family history on file.  Social History     Tobacco Use    Smoking status: Every Day     Current packs/day: 1.00     Types: Cigarettes    Smokeless tobacco: Not on file   Substance Use Topics    Alcohol use: Yes     Alcohol/week: 1.0 standard drink of alcohol     Types: 1 Shots of liquor per week    Drug use: Not on file       Physical Exam   ED Triage Vitals   Temp Pulse Resp BP   -- -- -- --      SpO2 Temp src Heart Rate Source Patient Position   -- -- -- --      BP Location FiO2 (%)     -- --       Physical Exam  Constitutional:       Appearance: Normal appearance.   HENT:      Head: Normocephalic and atraumatic.      Mouth/Throat:      Mouth: Mucous membranes are moist.   Eyes:      Extraocular Movements: Extraocular movements intact.      Pupils: Pupils are equal, round, and reactive to light.   Cardiovascular:      Rate and Rhythm: Normal rate and regular rhythm.   Pulmonary:      Effort: Pulmonary effort is normal.      Breath sounds: Normal  "breath sounds.   Abdominal:      General: Abdomen is flat.   Musculoskeletal:         General: Normal range of motion.      Cervical back: Normal range of motion and neck supple.   Skin:     General: Skin is warm and dry.   Neurological:      Mental Status: He is alert.      Comments: Oriented to self   Psychiatric:      Comments: agitated           ED Course & MDM   ED Course as of 08/28/24 0742   Fri Aug 23, 2024   1328 Patient more cooperative and redirectable.  He is requesting grilled cheese.  EPAT just completed assessment.  He reports that he was having trouble sleeping last night and he does remember taking 2 BuSpar's.  He reports he smoked weed but denies using any cocaine or methamphetamine. [JG]   1405 EPAT  reports he is up to date on haldol injections per mother.  He is much better now.  He reports he jumped off the balcony so he could prove to everyone that he is healthy-- \"I jumped and I was fine.  Just like an athlete.\"  His reasoning is not making sense.  Recommend looking for IP and continuing to monitor his symptoms here.  If he continues to be calm and cooperative and mother comfortable taking him home, could consider that as an alternative.  [JG]   1639 Patient is masturbating openly and making sitter uncomfortable.  Staff have reprimanded him to behave more respectfully.  He is requesting door closed.  Sitter can still observe him through window.  He is not suicidal but he is an elopement risk so will continue 1:1. [JG]      ED Course User Index  [JG] Chandni Blanco MD         Diagnoses as of 08/28/24 0742   Aggressive behavior                 No data recorded     Diamond Coma Scale Score: 15 (08/24/24 0800 : Celeste Flood RN)                           Medical Decision Making  Behavioral Restraint / Seclusion Face to Face Assessment    Patient Name:         Jesse Rico  YOB: 1985  Medical Record #:   [id number]      Time Restraints were placed:      Date Assessment " was completed: 8/23/2024    Time patient was assessed: see RN notes     Description of behavior causing restraint/seclusion: erratic aggressive behavior, not following instructions    Type of intervention: Mechanical restraint plus physical hold for forced IM meds    Patient's immediate situation: continued erratic aggressive behavior- shouting, thrashing    Alternatives Attempted: Alternatives attempted and have been ineffective.    Contraindications for Restraints: Reviewed contraindications for continued restraint use and agree to on-going need.    Patent's reaction to intervention: appears safe, appears to be tolerating restraint/seclusion without distress or adverse response, and behaviors have lessened but are still present    Patient's medical condition: vital signs stable and normal circulation and breathing    Patient's behavioral condition: continues to display agitated, threatening, or violent behavior to self and continues to display agitated, threatening, or violent behavior to others    Plan: Continue restraints        Amount and/or Complexity of Data Reviewed  Discussion of management or test interpretation with external provider(s): Cady will call tomorrow after 12 pm.    [facility] is their        Procedure  Procedures     Chandni Blanco MD  08/23/24 0832       Chandni Blanco MD  08/23/24 0911       Chandni Blanco MD  08/23/24 1447       Chandni Blanco MD  08/23/24 1642       Chandni Blanco MD  08/28/24 0734

## 2024-08-23 NOTE — PROGRESS NOTES
Transitional Care Coordination Progress Note:  Plan per Medical/Surgical team: treatment of psychosis with SI. Ativan & zyprexa, EPAT to eval & place  CTSCAN pending due to jumping off [facility]  Status: ED  Payor source: [facility] medicaid  Discharge disposition: Home with family  Patient’s mother/guardian, Cady Nash [phone]   Potential Barriers: hx of paranoid bipolar schizophrenia, polysubstance abuse   ADOD: 8/24/2024   DUARTE Ramirez RN, BSN Transitional Care Coordinator ED# [phone]      08/23/24 0902   Discharge Planning   Living Arrangements Parent;Family members   Support Systems Parent   Assistance Needed EPAT for psych eval & placement   Type of Residence Private residence   Number of Stairs to Enter Residence 1   Number of Stairs Within Residence 15   Home or Post Acute Services Community services   Expected Discharge Disposition BH   Does the patient need discharge transport arranged? Yes   RoundTrip coordination needed? Yes   Has discharge transport been arranged? No   Financial Resource Strain   How hard is it for you to pay for the very basics like food, housing, medical care, and heating? Somewhat   Housing Stability   In the last 12 months, was there a time when you were not able to pay the mortgage or rent on time? N   In the past 12 months, how many times have you moved where you were living? 1   At any time in the past 12 months, were you homeless or living in a shelter (including now)? N   Transportation Needs   In the past 12 months, has lack of transportation kept you from medical appointments or from getting medications? no   In the past 12 months, has lack of transportation kept you from meetings, work, or from getting things needed for daily living? No

## 2024-08-23 NOTE — ED TRIAGE NOTES
Pt presents to the ED after a possible suicide attempt. Per his grandmother he was found last night having what seemed to be a manic episode. He takes buspar medication and she found 11 pills missing out of the pack. His psych history is unknown and pt is unable to assess at this time. Family is also unsure of pt's psych history per EMS. When they arrived he jumped off the 2nd story of his building [employer] with no injury reported. Pt did not hit head or lose consciousness. VSS. Pt is incomprehensible. He has been aggressive and inappropriate with staff. MD to give PRN medications at this time.

## 2024-08-23 NOTE — PROGRESS NOTES
EPAT - Social Work Psychiatric Assessment    Arrival Details  Mode of Arrival: Ambulatory  Admission Source: Home  Admission Type: Involuntary  EPAT Assessment Start Date: 08/23/24  EPAT Assessment Start Time: 1310  Name of : Nadir Campos    History of Present Illness  Admission Reason: Overdose?SA?  HPI: Patient is a 39 year old male with a history of Psychosis and Polysubstance use. His mother/guardian is concerned about his disorganized thoughts and whether he was attempting to hurt himself. The patient attempt to jump off a 2nd story buidling.  A review of his Triage, Provider and [place] was conducted.    SW Readmission Information   Readmission within 30 Days: No    Psychiatric Symptoms  Anxiety Symptoms: Generalized  Depression Symptoms: Loss of interest  [place] Symptoms: Grandiosity    Psychosis Symptoms  Hallucination Type: No problems reported or observed.  Delusion Type: No problems reported or observed.    Additional Symptoms - Adult  Generalized Anxiety Disorder: Restlessness  Obsessive Compulsive Disorder: No problems reported or observed.  Panic Attack: No problems reported or observed.  Post Traumatic Stress Disorder: No problems reported or observed.  Delirium: No problems reported or observed.  Review of Symptoms Comments: Please see above    Past Psychiatric History/Meds/Treatments  Past Psychiatric History: Patient has a history of Schizoaffective Disorder and Polysubstance Use. [facility]. He is up to date on his Haldol IM and has his next one scheduled. He denied past suicide attempts.  Past Psychiatric Meds/Treatments: See med list. Patient is compliant  Past Violence/Victimization History: hx of agitation    Current Mental Health Contacts   Name/Phone Number: [facility]   Last Appointment Date: unknown  Provider Name/Phone Number: [facility]  Provider Last Appointment Date: 7/28/24    Support System: Immediate  family    Living Arrangement: House    Home Safety  Feels Safe Living in Home: Yes         [place] Service/Education History  Current or Previous  Service: None  Education Level: High school  History of Learning Problems: No  History of School Behavior Problems: No  School History: see above    Social/Cultural History  Social History: Patient's guardian is his mother  Important Activities: Family    Legal  Legal Concerns: none    Drug Screening  Have you used any substances (canabis, cocaine, heroin, hallucinogens, inhalants, etc.) in the past 12 months?: Yes  Have you used any prescription drugs other than prescribed in the past 12 months?: Yes  Is a toxicology screen needed?: Yes    Stage of Change  Stage of Change: Precontemplation  History of Treatment: Inpatient, Dual, AA/NA meetings, IOP, Sober living  Type of Treatment Offered: Inpatient, IOP, Individual, AA/NA meeting resource  Treatment Offered: Declined  Duration of Substance Use: unknown  Frequency of Substance Use: na/  Age of First Substance Use: n/a    Psychosocial  Psychosocial (WDL): Exceptions to WDL  Behaviors/Mood: Anxious, Agitated  Affect: Inconsistent with mood  Parent/Guardian/Significant Other Involvement: Attentive to patient needs  Family Behaviors: Appropriate for situation  Visitor Behaviors: Appropriate for situation    Orientation  Orientation Level: Oriented X4    General Appearance  Motor Activity: Restlessness  Speech Pattern: Pressured  General Attitude: Cooperative  Appearance/Hygiene: Disheveled    Thought Process  Coherency: Other (Comment)  Content: Unremarkable  Delusions: Other (Comment)  Perception: Not altered  Hallucination: None  Judgment/Insight: Limited  Confusion: None  Cognition: Follows commands    Sleep Pattern  Sleep Pattern: Difficulty falling asleep    Risk Factors  Self Harm/Suicidal Ideation Plan: Patient denies  Previous Self Harm/Suicidal Plans: patient denies  Risk Factors: None    Violence Risk  Assessment  Assessment of Violence: In past 6-12 months  Thoughts of Harm to Others: No - not currently/within last 6 months    Ability to Assess Risk Screen  Risk Screen - Ability to Assess: Able to be screened  Ask Suicide-Screening Questions  1. In the past few weeks, have you wished you were dead?: Yes  2. In the past few weeks, have you felt that you or your family would be better off if you were dead?: Yes  4. Have you ever tried to kill yourself?: Yes  5. Are you having thoughts of killing yourself right now?: No  Calculated Risk Score: Potential Risk  [place] Suicide Severity Rating Scale (Screener/Recent Self-Report)  1. Wish to be Dead (Past 1 Month): Yes  2. Non-Specific Active Suicidal Thoughts (Past 1 Month): Yes  3. Active Suicidal Ideation with any Methods (Not Plan) Without Intent to Act (Past 1 Month): Yes  4. Active Suicidal Ideation with Some Intent to Act, Without Specific Plan (Past 1 Month): Yes  5. Active Suicidal Ideation with Specific Plan and Intent (Past 1 Month): No  6. Suicidal Behavior (Lifetime): Yes  6. Suicidal Behavior (3 Months): No  Calculated C-SSRS Risk Score (Lifetime/Recent): High Risk  Step 1: Risk Factors  Current & Past Psychiatric Dx: Mood disorder  Presenting Symptoms: Anxiety and/or panic  Family History: Other (Comment)  Precipitants/Stressors: Triggering events leading to humiliation, shame, and/or despair (e.g. loss of relationship, financial or health status) (real or anticipated)  Change in Treatment: Non-compliant or not receiving treatment  Access to Lethal Methods : No  Step 2: Protective Factors   Protective Factors Internal: Identifies reasons for living  Protective Factors External: Cultural, spiritual and/or moral attitudes against suicide  Step 3: Suicidal Ideation Intensity  How Many Times Have You Had These Thoughts: Less than once a week  When You Have the Thoughts How Long do They Last : Fleeting - few seconds or minutes  Could/Can You Stop Thinking  "About Killing Yourself or Wanting to Die if You Want to: Easily able to control thoughts  Are There Things - Anyone or Anything - That Stopped You From Wanting to Die or Acting on: Deterrents definitely stopped you from attempting suicide  What Sort of Reasons Did You Have For Thinking About Wanting to Die or Killing Yourself: Completely to get attention, revenge, or a reaction from others  Total Score: 5  Step 5: Documentation  Risk Level: High suicide risk (Patient is high  risk.Dr. Blanco agrees.)    Psychiatric Impression and Plan of Care  Assessment and Plan: Patient is a 39 year old AA male who presented to the ED via EMS. His mother/guardian was concerned with recent behaviors. She states she believes the patient has been using substances, his prescribed medications are not right and he mighrt have attempted to overdose. She shared pills were missing from his bottle. The patient has had several inpatient stays and substance treatment programs in the past few months. His mother reports \"each one screwed up his medications\". The patient denied he attempted to kill himself but did jump out of a second story window.  He was guarded when discussing substance use. He denied any thoughts to harm others or hallucinations. He was medicated upon arrival due to agitation. The patient does report minimal sleep.  Specific Resources Provided to Patient: [facility] CM Notified: yes  PHP/IOP Recommended: none  Specific Information Provided for PHP/IOP: none  Plan Comments: n/a    Outcome/Disposition  Patient's Perception of Outcome Achieved: n/a      "

## 2024-08-23 NOTE — ED NOTES
This morning pt was placed in restraints by RN and  police at 0830 and taken out of restraints after falling asleep around 1030. Since restraints have been removed he has been very redirectable. Pt has been sleeping off and on all morning and afternoon. He is compliant with staff at this time. VSS.      Clint aGlindo RN  08/23/24 8289

## 2024-08-24 VITALS
HEART RATE: 78 BPM | OXYGEN SATURATION: 100 % | HEIGHT: 68 IN | DIASTOLIC BLOOD PRESSURE: 65 MMHG | WEIGHT: 145 LBS | TEMPERATURE: 97.8 F | BODY MASS INDEX: 21.98 KG/M2 | RESPIRATION RATE: 17 BRPM | SYSTOLIC BLOOD PRESSURE: 106 MMHG

## 2024-08-24 LAB
CK SERPL-CCNC: 553 U/L (ref 0–325)
CK SERPL-CCNC: 588 U/L (ref 0–325)

## 2024-08-24 PROCEDURE — 36415 COLL VENOUS BLD VENIPUNCTURE: CPT | Performed by: GENERAL PRACTICE

## 2024-08-24 PROCEDURE — 82550 ASSAY OF CK (CPK): CPT | Performed by: GENERAL PRACTICE

## 2024-08-24 PROCEDURE — 2500000004 HC RX 250 GENERAL PHARMACY W/ HCPCS (ALT 636 FOR OP/ED): Performed by: GENERAL PRACTICE

## 2024-08-24 PROCEDURE — 82550 ASSAY OF CK (CPK): CPT | Performed by: EMERGENCY MEDICINE

## 2024-08-24 PROCEDURE — 36415 COLL VENOUS BLD VENIPUNCTURE: CPT | Performed by: EMERGENCY MEDICINE

## 2024-08-24 RX ADMIN — SODIUM CHLORIDE 1000 ML: 9 INJECTION, SOLUTION INTRAVENOUS at 05:50

## 2024-08-24 SDOH — HEALTH STABILITY: MENTAL HEALTH: SUICIDE ASSESSMENT: ADULT (C-SSRS)

## 2024-08-24 SDOH — HEALTH STABILITY: MENTAL HEALTH: HAVE YOU ACTUALLY HAD ANY THOUGHTS OF KILLING YOURSELF?: NO

## 2024-08-24 SDOH — HEALTH STABILITY: MENTAL HEALTH: HAVE YOU WISHED YOU WERE DEAD OR WISHED YOU COULD GO TO SLEEP AND NOT WAKE UP?: YES

## 2024-08-24 SDOH — HEALTH STABILITY: MENTAL HEALTH: WAS THIS WITHIN THE PAST THREE MONTHS?: YES

## 2024-08-24 SDOH — HEALTH STABILITY: MENTAL HEALTH: HAVE YOU EVER DONE ANYTHING, STARTED TO DO ANYTHING, OR PREPARED TO DO ANYTHING TO END YOUR LIFE?: YES

## 2024-08-24 NOTE — SIGNIFICANT EVENT
Application for Emergency Admission      Ready for Transfer?  Is the patient medically cleared for transfer to inpatient psychiatry: Yes  Has the patient been accepted to an inpatient psychiatric hospital: Yes    Application for Emergency Admission  IN ACCORDANCE WITH [place] 5122.10 [place]  The Chief Clinical Officer of: [facility] 8/23/2024 .11:42 PM    Reason for Hospitalization  The undersigned has reason to believe that: Jesse Rico Is a mentally ill person subject to hospitalization by court order under division B Section 5122.01 of the Revised Code, i.e., this person:    1.Yes  Represents a substantial risk of physical harm to self as manifested by evidence of threats of, or attempts at, suicide or serious self-inflicted bodily harm    2.Yes Represents a substantial risk of physical harm to others as manifested by evidence of recent homicidal or other violent behavior, evidence of recent threats that place another in reasonable fear of violent behavior and serious physical harm, or other evidence of present dangerousness    3.Yes Represents a substantial and immediate risk of serious physical impairment or injury to self as manifested by  evidence that the person is unable to provide for and is not providing for the person's basic physical needs because of the person's mental illness and that appropriate provision for those needs cannot be made  immediately available in the community    4.Yes Would benefit from treatment in a hospital for his mental illness and is in need of such treatment as manifested by evidence of behavior that creates a grave and imminent risk to substantial rights of others or  himself.    5.Yes Would benefit from treatment as manifested by evidence of behavior that indicates all of the following:       (a) The person is unlikely to survive safely in the community without supervision, based on a clinical determination.       (b) The person has a history of lack of compliance with  treatment for mental illness and one of the following applies:      (i) At least twice within the thirty-six months prior to the filing of an affidavit seeking court-ordered treatment of the person under section 5122.111 of the Revised Code, the lack of compliance has been a significant factor in necessitating hospitalization in a hospital or receipt of services in a forensic or other mental health unit of a correctional facility, provided that the thirty-six-month period shall be extended by the length of any hospitalization or incarceration of the person that occurred within the thirty-six-month period.      (ii) Within the forty-eight months prior to the filing of an affidavit seeking court-ordered treatment of the person under section 5122.111 of the Revised Code, the lack of compliance resulted in one or more acts of serious violent behavior toward self or others or threats of, or attempts at, serious physical harm to self or others, provided that the forty-eight-month period shall be extended by the length of any hospitalization or incarceration of the person that occurred within the forty-eight-month period.      (c) The person, as a result of mental illness, is unlikely to voluntarily participate in necessary treatment.       (d) In view of the person's treatment history and current behavior, the person is in need of treatment in order to prevent a relapse or deterioration that would be likely to result in substantial risk of serious harm to the person or others.    (e) Represents a substantial risk of physical harm to self or others if allowed to remain at liberty pending examination.    Therefore, it is requested that said person be admitted to the above named facility.    STATEMENT OF BELIEF    Must be filled out by one of the following: a psychiatrist, licensed physician, licensed clinical psychologist, health or ,  or .  (Statement shall include the circumstances under  which the individual was taken into custody and the reason for the person's belief that hospitalization is necessary. The statement shall also include a reference to efforts made to secure the individual's property at his residence if he was taken into custody there. Every reasonable and appropriate effort should be made to take this person into custody in the least conspicuous manner possible.)    Patient to show everybody that he was healthy jumped off a second story dorothy sustained no injuries but unfortunately is suffering with a schizophrenic break requiring medication in emergency.    Tejinder Leonardo MD 8/23/2024     _____________________________________________________________   Place of Employment: [facility] emergency    STATEMENT OF OBSERVATION BY PSYCHIATRIST, LICENSED PHYSICIAN, OR LICENSED CLINICAL PSYCHOLOGIST, IF APPLICABLE    Place of Observation (e.g., [facility] mental health center, general hospital, office, emergency facility)  (If applicable, please complete)    Tejinder Leonardo MD 8/23/2024    _____________________________________________________________

## 2024-08-24 NOTE — ED PROVIDER NOTES
"HPI   Chief Complaint   Patient presents with    Suicidal     Pt came in via EMS for possible suicide attempt or overdose. According to his grandmother he possibly took too many of his buspirone medication. Pt is incomprehensible at this time and not following commands. He has had aggressive and inappropriate behavior with staff.        This is a handoff note at 11:41 PM this patient has been [facility] excepting patient has been informed and is happy that he finally has a place to go to.              Patient History   No past medical history on file.  No past surgical history on file.  No family history on file.  Social History     Tobacco Use    Smoking status: Every Day     Current packs/day: 1.00     Types: Cigarettes    Smokeless tobacco: Not on file   Substance Use Topics    Alcohol use: Yes     Alcohol/week: 1.0 standard drink of alcohol     Types: 1 Shots of liquor per week    Drug use: Not on file       Physical Exam   ED Triage Vitals   Temperature Heart Rate Respirations BP   08/23/24 0843 08/23/24 0830 08/23/24 0830 08/23/24 0830   36.3 °C (97.3 °F) 80 18 112/64      Pulse Ox Temp Source Heart Rate Source Patient Position   08/23/24 0830 08/23/24 0843 08/23/24 0843 08/23/24 0843   100 % Oral Monitor Lying      BP Location FiO2 (%)     08/23/24 0843 --     Left arm        Physical Exam      ED Course & MDM   ED Course as of 08/23/24 2341   Fri Aug 23, 2024   1328 Patient more cooperative and redirectable.  He is requesting grilled cheese.  NADEGE just completed assessment.  He reports that he was having trouble sleeping last night and he does remember taking 2 BuSpar's.  He reports he smoked weed but denies using any cocaine or methamphetamine. [JG]   4038 NADEGE  reports he is up to date on haldol injections per mother.  He is much better now.  He reports he jumped off the balcony so he could prove to everyone that he is healthy-- \"I jumped and I was fine.  Just like an athlete.\" "  His reasoning is not making sense.  Recommend looking for IP and continuing to monitor his symptoms here.  If he continues to be calm and cooperative and mother comfortable taking him home, could consider that as an alternative.  [JG]   1639 Patient is masturbating openly and making sitter uncomfortable.  Staff have reprimanded him to behave more respectfully.  He is requesting door closed.  Sitter can still observe him through window.  He is not suicidal but he is an elopement risk so will continue 1:1. [JG]      ED Course User Index  [JG] Chandni Blanco MD         Diagnoses as of 08/23/24 2341   Aggressive behavior                 No data recorded     [place] Coma Scale Score: 13 (08/23/24 0825 : Clint Galindo RN)                           Medical Decision Making      Procedure  Procedures     Tejinder Leonardo MD  08/23/24 2347

## 2024-09-25 ENCOUNTER — APPOINTMENT (OUTPATIENT)
Dept: PRIMARY CARE | Facility: CLINIC | Age: 39
End: 2024-09-25
Payer: MEDICAID

## 2024-10-25 ENCOUNTER — HOSPITAL ENCOUNTER (EMERGENCY)
Facility: HOSPITAL | Age: 39
Discharge: OTHER INSTITUTION | End: 2024-10-26
Attending: EMERGENCY MEDICINE
Payer: MEDICAID

## 2024-10-25 DIAGNOSIS — F23 ACUTE PSYCHOSIS (MULTI): Primary | ICD-10-CM

## 2024-10-25 LAB
ALBUMIN SERPL BCP-MCNC: 4.5 G/DL (ref 3.4–5)
ALP SERPL-CCNC: 63 U/L (ref 33–120)
ALT SERPL W P-5'-P-CCNC: 17 U/L (ref 10–52)
AMPHETAMINES UR QL SCN: ABNORMAL
ANION GAP SERPL CALC-SCNC: 17 MMOL/L (ref 10–20)
AST SERPL W P-5'-P-CCNC: 17 U/L (ref 9–39)
BARBITURATES UR QL SCN: ABNORMAL
BASOPHILS # BLD AUTO: 0.02 X10*3/UL (ref 0–0.1)
BASOPHILS NFR BLD AUTO: 0.4 %
BENZODIAZ UR QL SCN: ABNORMAL
BILIRUB SERPL-MCNC: 0.8 MG/DL (ref 0–1.2)
BUN SERPL-MCNC: 14 MG/DL (ref 6–23)
BZE UR QL SCN: ABNORMAL
CALCIUM SERPL-MCNC: 9 MG/DL (ref 8.6–10.3)
CANNABINOIDS UR QL SCN: ABNORMAL
CHLORIDE SERPL-SCNC: 101 MMOL/L (ref 98–107)
CO2 SERPL-SCNC: 23 MMOL/L (ref 21–32)
CREAT SERPL-MCNC: 1.05 MG/DL (ref 0.5–1.3)
EGFRCR SERPLBLD CKD-EPI 2021: >90 ML/MIN/1.73M*2
EOSINOPHIL # BLD AUTO: 0.09 X10*3/UL (ref 0–0.7)
EOSINOPHIL NFR BLD AUTO: 1.6 %
ERYTHROCYTE [DISTWIDTH] IN BLOOD BY AUTOMATED COUNT: 12.7 % (ref 11.5–14.5)
FENTANYL+NORFENTANYL UR QL SCN: ABNORMAL
GLUCOSE SERPL-MCNC: 90 MG/DL (ref 74–99)
HCT VFR BLD AUTO: 41.2 % (ref 41–52)
HGB BLD-MCNC: 13.5 G/DL (ref 13.5–17.5)
IMM GRANULOCYTES # BLD AUTO: 0.01 X10*3/UL (ref 0–0.7)
IMM GRANULOCYTES NFR BLD AUTO: 0.2 % (ref 0–0.9)
LYMPHOCYTES # BLD AUTO: 1.71 X10*3/UL (ref 1.2–4.8)
LYMPHOCYTES NFR BLD AUTO: 30.2 %
MCH RBC QN AUTO: 28.1 PG (ref 26–34)
MCHC RBC AUTO-ENTMCNC: 32.8 G/DL (ref 32–36)
MCV RBC AUTO: 86 FL (ref 80–100)
METHADONE UR QL SCN: ABNORMAL
MONOCYTES # BLD AUTO: 0.79 X10*3/UL (ref 0.1–1)
MONOCYTES NFR BLD AUTO: 14 %
NEUTROPHILS # BLD AUTO: 3.04 X10*3/UL (ref 1.2–7.7)
NEUTROPHILS NFR BLD AUTO: 53.6 %
NRBC BLD-RTO: 0 /100 WBCS (ref 0–0)
OPIATES UR QL SCN: ABNORMAL
OXYCODONE+OXYMORPHONE UR QL SCN: ABNORMAL
PCP UR QL SCN: ABNORMAL
PLATELET # BLD AUTO: 218 X10*3/UL (ref 150–450)
POTASSIUM SERPL-SCNC: 3.8 MMOL/L (ref 3.5–5.3)
PROT SERPL-MCNC: 6.9 G/DL (ref 6.4–8.2)
RBC # BLD AUTO: 4.81 X10*6/UL (ref 4.5–5.9)
SODIUM SERPL-SCNC: 137 MMOL/L (ref 136–145)
WBC # BLD AUTO: 5.7 X10*3/UL (ref 4.4–11.3)

## 2024-10-25 PROCEDURE — 85025 COMPLETE CBC W/AUTO DIFF WBC: CPT | Performed by: EMERGENCY MEDICINE

## 2024-10-25 PROCEDURE — 36415 COLL VENOUS BLD VENIPUNCTURE: CPT | Performed by: EMERGENCY MEDICINE

## 2024-10-25 PROCEDURE — 80307 DRUG TEST PRSMV CHEM ANLYZR: CPT | Performed by: EMERGENCY MEDICINE

## 2024-10-25 PROCEDURE — 2500000001 HC RX 250 WO HCPCS SELF ADMINISTERED DRUGS (ALT 637 FOR MEDICARE OP): Performed by: EMERGENCY MEDICINE

## 2024-10-25 PROCEDURE — 80053 COMPREHEN METABOLIC PANEL: CPT | Performed by: EMERGENCY MEDICINE

## 2024-10-25 PROCEDURE — 99285 EMERGENCY DEPT VISIT HI MDM: CPT

## 2024-10-25 RX ORDER — HALOPERIDOL 5 MG/1
5 TABLET ORAL 2 TIMES DAILY
COMMUNITY

## 2024-10-25 RX ORDER — OLANZAPINE 10 MG/1
10 TABLET ORAL NIGHTLY
COMMUNITY

## 2024-10-25 RX ORDER — ARIPIPRAZOLE 10 MG/1
10 TABLET ORAL ONCE
Status: DISCONTINUED | OUTPATIENT
Start: 2024-10-25 | End: 2024-10-25

## 2024-10-25 RX ORDER — BENZTROPINE MESYLATE 1 MG/1
1 TABLET ORAL ONCE
Status: COMPLETED | OUTPATIENT
Start: 2024-10-25 | End: 2024-10-25

## 2024-10-25 RX ORDER — HALOPERIDOL 5 MG/1
5 TABLET ORAL ONCE
Status: COMPLETED | OUTPATIENT
Start: 2024-10-25 | End: 2024-10-25

## 2024-10-25 RX ORDER — BUSPIRONE HYDROCHLORIDE 5 MG/1
5 TABLET ORAL ONCE
Status: DISCONTINUED | OUTPATIENT
Start: 2024-10-25 | End: 2024-10-25

## 2024-10-25 RX ADMIN — HALOPERIDOL 5 MG: 5 TABLET ORAL at 11:27

## 2024-10-25 RX ADMIN — BENZTROPINE MESYLATE 1 MG: 1 TABLET ORAL at 11:27

## 2024-10-25 SDOH — HEALTH STABILITY: MENTAL HEALTH: HAVE YOU ACTUALLY HAD ANY THOUGHTS OF KILLING YOURSELF?: NO

## 2024-10-25 SDOH — HEALTH STABILITY: MENTAL HEALTH: HAVE YOU EVER DONE ANYTHING, STARTED TO DO ANYTHING, OR PREPARED TO DO ANYTHING TO END YOUR LIFE?: NO

## 2024-10-25 SDOH — HEALTH STABILITY: MENTAL HEALTH: IN THE PAST WEEK, HAVE YOU BEEN HAVING THOUGHTS ABOUT KILLING YOURSELF?: NO

## 2024-10-25 SDOH — HEALTH STABILITY: MENTAL HEALTH: IN THE PAST FEW WEEKS, HAVE YOU FELT THAT YOU OR YOUR FAMILY WOULD BE BETTER OFF IF YOU WERE DEAD?: NO

## 2024-10-25 SDOH — HEALTH STABILITY: MENTAL HEALTH: HAVE YOU EVER DONE ANYTHING, STARTED TO DO ANYTHING, OR PREPARED TO DO ANYTHING TO END YOUR LIFE?: YES

## 2024-10-25 SDOH — HEALTH STABILITY: MENTAL HEALTH: ARE YOU HAVING THOUGHTS OF KILLING YOURSELF RIGHT NOW?: NO

## 2024-10-25 SDOH — HEALTH STABILITY: MENTAL HEALTH: SUICIDAL BEHAVIOR (3 MONTHS): NO

## 2024-10-25 SDOH — HEALTH STABILITY: MENTAL HEALTH: HAVE YOU EVER TRIED TO KILL YOURSELF?: NO

## 2024-10-25 SDOH — HEALTH STABILITY: MENTAL HEALTH: FOR HIGH RISK PATIENTS: 1:1 PATIENT OBSERVER AT ALL TIMES;ALL INTERVENTIONS ABOVE, PLUS:

## 2024-10-25 SDOH — HEALTH STABILITY: MENTAL HEALTH: NON-SPECIFIC ACTIVE SUICIDAL THOUGHTS (PAST 1 MONTH): NO

## 2024-10-25 SDOH — HEALTH STABILITY: MENTAL HEALTH: SUICIDE ASSESSMENT: ADULT (C-SSRS)

## 2024-10-25 SDOH — HEALTH STABILITY: MENTAL HEALTH: SUICIDAL BEHAVIOR (LIFETIME): NO

## 2024-10-25 SDOH — HEALTH STABILITY: MENTAL HEALTH: FOR HIGH RISK PATIENTS: 1:1 PATIENT OBSERVER AT ALL TIMES

## 2024-10-25 SDOH — HEALTH STABILITY: MENTAL HEALTH: HALLUCINATION: AUDITORY

## 2024-10-25 SDOH — HEALTH STABILITY: MENTAL HEALTH: CONTENT: UNABLE TO ASSESS

## 2024-10-25 SDOH — HEALTH STABILITY: MENTAL HEALTH: IN THE PAST FEW WEEKS, HAVE YOU WISHED YOU WERE DEAD?: NO

## 2024-10-25 SDOH — HEALTH STABILITY: MENTAL HEALTH: BEHAVIORS/MOOD: COOPERATIVE

## 2024-10-25 SDOH — HEALTH STABILITY: MENTAL HEALTH: WAS THIS WITHIN THE PAST THREE MONTHS?: NO

## 2024-10-25 SDOH — HEALTH STABILITY: MENTAL HEALTH: BEHAVIORS/MOOD: APPEARS INTOXICATED;HALLUCINATIONS;FLIRTATIOUS;CRYING;AGITATED

## 2024-10-25 SDOH — HEALTH STABILITY: MENTAL HEALTH: SLEEP PATTERN: RESTLESSNESS

## 2024-10-25 SDOH — HEALTH STABILITY: MENTAL HEALTH: BEHAVIORAL HEALTH(WDL): EXCEPTIONS TO WDL

## 2024-10-25 SDOH — HEALTH STABILITY: MENTAL HEALTH: NEEDS EXPRESSED: DENIES

## 2024-10-25 SDOH — HEALTH STABILITY: MENTAL HEALTH: WISH TO BE DEAD (PAST 1 MONTH): NO

## 2024-10-25 SDOH — HEALTH STABILITY: MENTAL HEALTH: HAVE YOU WISHED YOU WERE DEAD OR WISHED YOU COULD GO TO SLEEP AND NOT WAKE UP?: YES

## 2024-10-25 SDOH — HEALTH STABILITY: MENTAL HEALTH: HAVE YOU WISHED YOU WERE DEAD OR WISHED YOU COULD GO TO SLEEP AND NOT WAKE UP?: NO

## 2024-10-25 SDOH — HEALTH STABILITY: MENTAL HEALTH: DEPRESSION SYMPTOMS: NO PROBLEMS REPORTED OR OBSERVED.

## 2024-10-25 SDOH — HEALTH STABILITY: MENTAL HEALTH: ANXIETY SYMPTOMS: GENERALIZED

## 2024-10-25 ASSESSMENT — PAIN SCALES - GENERAL
PAINLEVEL_OUTOF10: 0 - NO PAIN
PAINLEVEL_OUTOF10: 0 - NO PAIN

## 2024-10-25 ASSESSMENT — COLUMBIA-SUICIDE SEVERITY RATING SCALE - C-SSRS
6. HAVE YOU EVER DONE ANYTHING, STARTED TO DO ANYTHING, OR PREPARED TO DO ANYTHING TO END YOUR LIFE?: NO
1. SINCE LAST CONTACT, HAVE YOU WISHED YOU WERE DEAD OR WISHED YOU COULD GO TO SLEEP AND NOT WAKE UP?: NO
2. HAVE YOU ACTUALLY HAD ANY THOUGHTS OF KILLING YOURSELF?: NO
6. HAVE YOU EVER DONE ANYTHING, STARTED TO DO ANYTHING, OR PREPARED TO DO ANYTHING TO END YOUR LIFE?: YES
1. IN THE PAST MONTH, HAVE YOU WISHED YOU WERE DEAD OR WISHED YOU COULD GO TO SLEEP AND NOT WAKE UP?: YES
6. HAVE YOU EVER DONE ANYTHING, STARTED TO DO ANYTHING, OR PREPARED TO DO ANYTHING TO END YOUR LIFE?: NO
2. HAVE YOU ACTUALLY HAD ANY THOUGHTS OF KILLING YOURSELF?: NO

## 2024-10-25 ASSESSMENT — LIFESTYLE VARIABLES
SUBSTANCE_ABUSE_PAST_12_MONTHS: YES
PRESCIPTION_ABUSE_PAST_12_MONTHS: YES

## 2024-10-25 ASSESSMENT — PAIN - FUNCTIONAL ASSESSMENT: PAIN_FUNCTIONAL_ASSESSMENT: 0-10

## 2024-10-25 NOTE — ED NOTES
Pt masturbating in front of staff and sitter, told that this behavior was not appropriate and that he needed to stop and he became agitated.  police at bedside.      Clint Galindo RN  10/25/24 5934

## 2024-10-25 NOTE — SIGNIFICANT EVENT
Application for Emergency Admission      Ready for Transfer?  Is the patient medically cleared for transfer to inpatient psychiatry: Yes  Has the patient been accepted to an inpatient psychiatric hospital: Yes    Application for Emergency Admission  IN ACCORDANCE WITH [place] 5122.10 [place]  The Chief Clinical Officer of: [facility] 10/25/2024 .5:54 PM    Reason for Hospitalization  The undersigned has reason to believe that: Jesse Rico Is a mentally ill person subject to hospitalization by court order under division B Section 5122.01 of the Revised Code, i.e., this person:    1.No  Represents a substantial risk of physical harm to self as manifested by evidence of threats of, or attempts at, suicide or serious self-inflicted bodily harm    2.No Represents a substantial risk of physical harm to others as manifested by evidence of recent homicidal or other violent behavior, evidence of recent threats that place another in reasonable fear of violent behavior and serious physical harm, or other evidence of present dangerousness    3.Yes Represents a substantial and immediate risk of serious physical impairment or injury to self as manifested by  evidence that the person is unable to provide for and is not providing for the person's basic physical needs because of the person's mental illness and that appropriate provision for those needs cannot be made  immediately available in the community    4.Yes Would benefit from treatment in a hospital for his mental illness and is in need of such treatment as manifested by evidence of behavior that creates a grave and imminent risk to substantial rights of others or  himself.    5.Yes Would benefit from treatment as manifested by evidence of behavior that indicates all of the following:       (a) The person is unlikely to survive safely in the community without supervision, based on a clinical determination.       (b) The person has a history of lack of compliance with  treatment for mental illness and one of the following applies:      (i) At least twice within the thirty-six months prior to the filing of an affidavit seeking court-ordered treatment of the person under section 5122.111 of the Revised Code, the lack of compliance has been a significant factor in necessitating hospitalization in a hospital or receipt of services in a forensic or other mental health unit of a correctional facility, provided that the thirty-six-month period shall be extended by the length of any hospitalization or incarceration of the person that occurred within the thirty-six-month period.      (ii) Within the forty-eight months prior to the filing of an affidavit seeking court-ordered treatment of the person under section 5122.111 of the Revised Code, the lack of compliance resulted in one or more acts of serious violent behavior toward self or others or threats of, or attempts at, serious physical harm to self or others, provided that the forty-eight-month period shall be extended by the length of any hospitalization or incarceration of the person that occurred within the forty-eight-month period.      (c) The person, as a result of mental illness, is unlikely to voluntarily participate in necessary treatment.       (d) In view of the person's treatment history and current behavior, the person is in need of treatment in order to prevent a relapse or deterioration that would be likely to result in substantial risk of serious harm to the person or others.    (e) Represents a substantial risk of physical harm to self or others if allowed to remain at liberty pending examination.    Therefore, it is requested that said person be admitted to the above named facility.    STATEMENT OF BELIEF    Must be filled out by one of the following: a psychiatrist, licensed physician, licensed clinical psychologist, health or ,  or .  (Statement shall include the circumstances under  which the individual was taken into custody and the reason for the person's belief that hospitalization is necessary. The statement shall also include a reference to efforts made to secure the individual's property at his residence if he was taken into custody there. Every reasonable and appropriate effort should be made to take this person into custody in the least conspicuous manner possible.)    Patient has decompensated bipolar disorder.  Currently unsafe to be discharged would benefit of inpatient psychiatric assessment treatment.    Vandana Sloan MD 10/25/2024   Vandana Sloan  _____________________________________________________________   Place of Employment: [employer]    STATEMENT OF OBSERVATION BY PSYCHIATRIST, LICENSED PHYSICIAN, OR LICENSED CLINICAL PSYCHOLOGIST, IF APPLICABLE    Place of Observation (e.g., [facility] mental health center, general hospital, office, emergency facility)  (If applicable, please complete)    Vandana Sloan MD 10/25/2024    _____________________________________________________________

## 2024-10-25 NOTE — PROGRESS NOTES
Pharmacy Medication History Review    !!!!SEE NOTES!!!    Spoke with caregiver , [facility] pharmacy and [facility]. Per the caregiver the patient is only supposed to be on the following items:    Benztropine 1mg taking 1 tablet daily  Gabapentin 100mg 1 cap 3 times daily  Haldol 100mg injection 2ml every 28 days  Haldol 5mg 1 tablet twice daily  Trazodone 150mg 1 tablet at bedtime    Per [facility] they are filling the following:    Benztropine 1 tablet twice daily  Buspar 10mg 1 tab twice daily   Depakote er 500mg 1 tablet twice daily   Gabapentin 100mg 1 cap 3 times daily   Haldol injection  Loxapine 50mg 1 cap twice daily   Mirtazapine 30mg at bedtime    Per [facility] they are filling the following:  Benztropine 1mg 1 tablet daily  Wellbutrin xl 150mg 1 tablet daily ( last 9/7/24 30 days )   Depakote er 500mg 3 tabs at bedtime  Gabapentin 100mg 1 cap 3 times daily   Haloperidol 5mg tabs 1 tab twice daily  Olanzapine 10mg 1 at bedtime   Propranolol 10mg 1 tab twice daily ( last 9/7/24  30 days)  Trazodone 150mg 1 tab at bedtime.         Jesse Rico is a 39 y.o. male admitted for No Principal Problem: There is no principal problem currently on the Problem List. Please update the Problem List and refresh.. Pharmacy reviewed the patient's [id number] medications and allergies for accuracy.    The list below reflectives the updated PTA list. Please review each medication in order reconciliation for additional clarification and justification.       The list below reflectives the updated allergy list. Please review each documented allergy for additional clarification and justification.  Allergies  Reviewed by Clint Galindo RN on 10/25/2024        Severity Reactions Comments    Lactase Not Specified Nausea And Vomiting, Unknown             Below are additional concerns with the patient's PTA list.  Prior to Admission Medications   Prescriptions Last Dose Informant   OLANZapine (ZyPREXA) 10 mg tablet     Sig: Take 1 tablet  (10 mg) by mouth once daily at bedtime.   benztropine (Cogentin) 1 mg tablet  Other   Sig: Take 1 tablet (1 mg) by mouth once daily.   buPROPion XL (Wellbutrin XL) 150 mg 24 hr tablet  Other   Sig: Take 1 tablet (150 mg) by mouth once daily in the morning.   busPIRone (Buspar) 10 mg tablet  Other   Sig: Take 1 tablet (10 mg) by mouth 2 times a day.   divalproex (Depakote ER) 500 mg 24 hr tablet     Sig: Take 1 tablet (500 mg) by mouth 2 times a day. Do not crush, chew, or split. In the morning   divalproex (Depakote ER) 500 mg 24 hr tablet     Sig: Take 3 tablets (1,500 mg) by mouth once daily at bedtime. At 2pm and 9pm   gabapentin (Neurontin) 100 mg capsule  Other   Sig: Take 1 capsule (100 mg) by mouth 3 times a day.   haloperidol (Haldol) 5 mg tablet     Sig: Take 1 tablet (5 mg) by mouth 2 times a day.   haloperidol decanoate (Haldol Decanoate) 100 mg/mL injection 10/11/2024 Other   Sig: Inject 2 mL (200 mg) into the muscle every 28 (twenty-eight) days.   loxapine (Loxitane) 50 mg capsule  Other   Sig: Take 1 capsule (50 mg) by mouth 2 times a day.   mirtazapine (Remeron) 30 mg tablet  Other   Sig: Take 1 tablet (30 mg) by mouth once daily at bedtime.   propranolol (Inderal) 10 mg tablet  Other   Sig: Take 1 tablet (10 mg) by mouth 2 times a day.   traZODone (Desyrel) 150 mg tablet     Sig: Take 1 tablet (150 mg) by mouth once daily at bedtime.      Facility-Administered Medications: None        Carrol Hickman

## 2024-10-25 NOTE — ED PROVIDER NOTES
HPI   Chief Complaint   Patient presents with    Psychiatric Evaluation       39-year-old man history of bipolar disorder and polysubstance abuse brought in by [email or web address] for erratic behavior.  Grandmother called due to concern that he was off his meds and possibly using crack cocaine.  Patient is unable to provide history in his current condition and is asking caregivers about their sexual habits.                Patient History   No past medical history on file.  No past surgical history on file.  No family history on file.  Social History     Tobacco Use    Smoking status: Every Day     Current packs/day: 1.00     Types: Cigarettes    Smokeless tobacco: Not on file   Substance Use Topics    Alcohol use: Yes     Alcohol/week: 1.0 standard drink of alcohol     Types: 1 Shots of liquor per week    Drug use: Not on file       Physical Exam   ED Triage Vitals [10/25/24 0940]   Temperature Heart Rate Respirations BP   36.9 °C (98.5 °F) 94 20 112/75      Pulse Ox Temp Source Heart Rate Source Patient Position   98 % Oral Monitor Lying      BP Location FiO2 (%)     Left arm --       Physical Exam  Vitals and nursing note reviewed.   Constitutional:       General: He is not in acute distress.     Appearance: He is not toxic-appearing.      Comments: Poor dentition   HENT:      Head: Normocephalic.      Mouth/Throat:      Mouth: Mucous membranes are moist.   Eyes:      Conjunctiva/sclera: Conjunctivae normal.      Pupils: Pupils are equal, round, and reactive to light.   Cardiovascular:      Rate and Rhythm: Normal rate and regular rhythm.      Pulses:           Radial pulses are 2+ on the right side and 2+ on the left side.   Pulmonary:      Effort: Pulmonary effort is normal. No respiratory distress.      Breath sounds: Normal breath sounds.   Abdominal:      General: Abdomen is flat.      Palpations: Abdomen is soft.      Tenderness: There is no abdominal tenderness. There is no guarding or rebound.    Musculoskeletal:      Cervical back: Normal range of motion and neck supple.      Right lower leg: No edema.      Left lower leg: No edema.   Skin:     General: Skin is warm.   Neurological:      Mental Status: He is alert and oriented to person, place, and time.   Psychiatric:      Comments: Slurred speech, hypersexual, high energy           ED Course & MDM   ED Course as of 10/30/24 0836   Fri Oct 25, 2024   1556 Signed out to Dr. Sloan pending EPAT dispo.  Pt has been cooperative here.  [JG]      ED Course User Index  [JG] Chandni Blanco MD         Diagnoses as of 10/30/24 0836   Acute psychosis (Multi)                 No data recorded     [place] Coma Scale Score: 13 (10/25/24 1000 : Clint Galindo RN)                           Medical Decision Making  Will restart home meds.     Amount and/or Complexity of Data Reviewed  Independent Historian: caregiver     Details: Cady is mother, legal guardian.  Last hospitalization at [facility].  [place] last Monday- he was there for 8 days.   [facility].      Haldol injection due Nov 11  1 mg benztropine  Trazadone 150 at bedtime  Haldol 5 mg po bid          Procedure  Procedures     Chandni Blanco MD  10/25/24 1046       Chandni Blanco MD  10/30/24 0836

## 2024-10-25 NOTE — SIGNIFICANT EVENT
"   10/25/24 1100   Arrival Details   Mode of Arrival Ambulance   Admission Source Home   Admission Type Voluntary   EPAT Assessment Start Date 10/25/24   EPAT Assessment Start Time 1100   Name of  Sofiya Osei   History of Present Illness   Admission Reason Erratic Behavior   HPI Pt is a 38 y/o male presenting to [facility] ED due to behavior and mental health concerns. Pt has an extensive mental health and polysubstance use pt  had several pychiatric admissions, last admission at [facility] in August, 2024. Pt guardian called Alethea MURO due to erratic behavior. Guardian reported concerns for non-compliance of medication and substance use. Pt was previously diagnosis Bipolar , Schizophernia and presents with a labile mood and tangential thought. Pt appeared distracted and has difficulties providing direct responses to questions. Pt made a sexualized statement during evaluation stating \" do you want to see me do it\".  Pt reports having outpatient [facility] stating that he recieved psychiatric treatment two weeks ago however, hasn't been compliant with medications in two days. Pt is a poor historian  providing limited insight regarding treatment needs. SW called legal guardian listed and left voicemail for return call.   SW Readmission Information    Readmission within 30 Days No   Psychiatric Symptoms   Anxiety Symptoms Generalized   Depression Symptoms No problems reported or observed.   [place] Symptoms Hypersexuality;Labile;Poor judgment   Psychosis Symptoms   Hallucination Type Auditory   Delusion Type Thought insertion   Additional Symptoms - Adult   Generalized Anxiety Disorder Difficult to control worry;Difficulity concentrating;Excessive anxiety/worry;Irritability   Obsessive Compulsive Disorder No problems reported or observed.   Panic Attack No problems reported or observed.   Post Traumatic Stress Disorder No problems reported or observed.   Delirium No problems reported or observed. "   Review of Symptoms Comments Pt presents with a labile mood, tangential speech, AH with thought insertion. Pt is unable to answer direct questions however, asked SW if he wants to see her do it.   Past Psychiatric History/Meds/Treatments   Past Psychiatric History Pt has an extensive mental bipolar and schizopphernia.   Past Psychiatric Meds/Treatments Pt reports outpatient treatment through [facility].   Past Violence/Victimization History None noted.   Current Mental Health Contacts    Name/Phone Number n/a    Last Appointment Date n/a   Provider Name/Phone Number n/a   Provider Last Appointment Date n/a   Support System   Support System Immediate family   Living Arrangement   Living Arrangement Lives alone   Drug Screening   Have you used any substances (canabis, cocaine, heroin, hallucinogens, inhalants, etc.) in the past 12 months? Yes   Have you used any prescription drugs other than prescribed in the past 12 months? Yes   Is a toxicology screen needed? Yes   Stage of Change   History of Treatment Inpatient   Type of Treatment Offered Inpatient   Treatment Offered Other (Comment)   Duration of Substance Use Unknown   Frequency of Substance Use Unknown   Age of First Substance Use Unknown   Behavioral Health   Behavioral Health(WDL) X   Behaviors/Mood Appears intoxicated   Affect Unable to assess   Needs Expressed Denies   General Appearance   Motor Activity Unremarkable   Speech Pattern Repetitive   General Attitude Flirtatious   Appearance/Hygiene Disheveled   Thought Process   Coherency Unable to assess   Content Unremarkable   Perception Hallucinations   Hallucination Auditory   Judgment/Insight Poor   Confusion Moderate   Cognition Impulsive;Poor judgement   Sleep Pattern   Sleep Pattern Restlessness   Risk Factors   Self Harm/Suicidal Ideation Plan None noted   Previous Self Harm/Suicidal Plans None noted   Risk Factors 1st psychiatric hospitalization by age 19   Description of  Thoughts/Ideas Leaving Unit Now None noted.   Violence Risk Assessment   Assessment of Violence None noted   Thoughts of Harm to Others No   Ability to Assess Risk Screen   Risk Screen - Ability to Assess Unable to assess   Ask Suicide-Screening Questions   1. In the past few weeks, have you wished you were dead? N   2. In the past few weeks, have you felt that you or your family would be better off if you were dead? N   3. In the past week, have you been having thoughts about killing yourself? N   4. Have you ever tried to kill yourself? N   5. Are you having thoughts of killing yourself right now? N   Calculated Risk Score No intervention is necessary   [place] Suicide Severity Rating Scale (Screener/Recent Self-Report)   1. Wish to be Dead (Past 1 Month) N   2. Non-Specific Active Suicidal Thoughts (Past 1 Month) N   6. Suicidal Behavior (Lifetime) N   6. Suicidal Behavior (3 Months) N   Step 1: Risk Factors   Current & Past Psychiatric Dx Mood disorder;Alcohol/substance abuse disorders;Psychotic disorder;Conduct problems (antisocial behavior, aggression, impulsivity)   Presenting Symptoms Impulsivity;Psychosis   Precipitants/Stressors Triggering events leading to humiliation, shame, and/or despair (e.g. loss of relationship, financial or health status) (real or anticipated);Substance intoxication or withdrawal   Change in Treatment Non-compliant or not receiving treatment   Access to Lethal Methods  No   Step 3: Suicidal Ideation Intensity   Most Severe Suicidal Ideation Identified n/a   Psychiatric Impression and Plan of Care   Assessment and Plan Pt is a 40 y/o male presenting to the [facility] ED for erratic and psychotic behavior. Pt guardian called PD due to an increase of erratic behaviors and non-compliance with medications. Pt is not functioning at baseline and meets the criteria for inpatient admission for safety and stabilization.   Specific Resources Provided to Patient Inpatient Admission   CM Notified  n/a   PHP/IOP Recommended n/a   Specific Information Provided for PHP/IOP n/a   Plan Comments Pt meets the criteria for inpatient admission for safety and stabilization.   Outcome/Disposition   Patient's Perception of Outcome Achieved Unknown.   Assessment, Recommendations and Risk Level Reviewed with Pt has a low risk level for SI however, presents with erratic behaviors and psychosis. Pt is not functioning at baseline due to non-compliance with treatment. Pt meets the criteria for inpatient admission for safety and stabilization.   Contact Name Cady Saad   Contact Number(s) [phone]   Contact Relationship Mother   EPAT Assessment Completed Date 10/25/24   EPAT Assessment Completed Time 1200   Patient Disposition  Adult Inpatient Psych   EPAT SW Charges   Psychotherapy with Patient Virtual   [facility] Psychotherapy with Patient Virtual   Psychotherapy Crisis Initial 60 minutes

## 2024-10-25 NOTE — ED TRIAGE NOTES
Pt presents to ED via EMS for a psychiatric evaluation. He was brought in by Alethea MURO after a call from his mother. She states that his behavior has been more erratic and that she believes he has been using crack cocaine again. Has extensive psych hx and pt is unable to assess at this time currently having auditory hallucinations. A&Ox1. VSS.

## 2024-10-25 NOTE — ED NOTES
Pt declined from [facility] due to acuity. SW sent referral to [facility] for review. SW will continue to follow up for disposition needs.

## 2024-10-26 VITALS
RESPIRATION RATE: 16 BRPM | HEIGHT: 68 IN | OXYGEN SATURATION: 100 % | BODY MASS INDEX: 22.05 KG/M2 | DIASTOLIC BLOOD PRESSURE: 74 MMHG | HEART RATE: 78 BPM | SYSTOLIC BLOOD PRESSURE: 136 MMHG | TEMPERATURE: 98.5 F

## 2024-10-26 SDOH — HEALTH STABILITY: MENTAL HEALTH: FOR HIGH RISK PATIENTS: 1:1 PATIENT OBSERVER AT ALL TIMES

## 2024-10-26 SDOH — HEALTH STABILITY: MENTAL HEALTH: CONTENT: UNREMARKABLE

## 2024-10-26 SDOH — HEALTH STABILITY: MENTAL HEALTH

## 2024-10-26 SDOH — HEALTH STABILITY: MENTAL HEALTH: WAS THIS WITHIN THE PAST THREE MONTHS?: NO

## 2024-10-26 SDOH — SOCIAL STABILITY: SOCIAL NETWORK: VISITOR BEHAVIORS: UNABLE TO ASSESS

## 2024-10-26 SDOH — SOCIAL STABILITY: SOCIAL INSECURITY: FAMILY BEHAVIORS: UNABLE TO ASSESS

## 2024-10-26 SDOH — HEALTH STABILITY: MENTAL HEALTH: SUICIDE ASSESSMENT: ADULT (C-SSRS)

## 2024-10-26 SDOH — HEALTH STABILITY: MENTAL HEALTH: BEHAVIORAL HEALTH(WDL): EXCEPTIONS TO WDL

## 2024-10-26 SDOH — HEALTH STABILITY: MENTAL HEALTH: NEEDS EXPRESSED: DENIES

## 2024-10-26 SDOH — HEALTH STABILITY: MENTAL HEALTH: HAVE YOU WISHED YOU WERE DEAD OR WISHED YOU COULD GO TO SLEEP AND NOT WAKE UP?: NO

## 2024-10-26 SDOH — HEALTH STABILITY: MENTAL HEALTH
OTHER SUICIDE PRECAUTIONS INCLUDE: PATIENT PLACED IN AN EASILY OBSERVABLE ROOM WITH DOOR/CURTAIN REMAINING OPEN;PATIENT PLACED IN GOWN (SNAPS OR PAPER GOWNS PREFERRED) AND WANDED;PATIENT PLACED IN PSYCH SAFE ROOM (IF AVAILABLE);ELOPEMENT RISK IDENTIFIED;FREQUENT ROUNDING WITH IRREGULAR CH

## 2024-10-26 SDOH — HEALTH STABILITY: MENTAL HEALTH: HAVE YOU EVER TRIED TO KILL YOURSELF?: NO

## 2024-10-26 SDOH — HEALTH STABILITY: MENTAL HEALTH: ARE YOU HAVING THOUGHTS OF KILLING YOURSELF RIGHT NOW?: NO

## 2024-10-26 SDOH — HEALTH STABILITY: MENTAL HEALTH: HAVE YOU ACTUALLY HAD ANY THOUGHTS OF KILLING YOURSELF?: NO

## 2024-10-26 SDOH — HEALTH STABILITY: MENTAL HEALTH: IN THE PAST FEW WEEKS, HAVE YOU WISHED YOU WERE DEAD?: NO

## 2024-10-26 SDOH — HEALTH STABILITY: MENTAL HEALTH: HAVE YOU EVER DONE ANYTHING, STARTED TO DO ANYTHING, OR PREPARED TO DO ANYTHING TO END YOUR LIFE?: NO

## 2024-10-26 SDOH — HEALTH STABILITY: MENTAL HEALTH: DELUSIONS: CONTROLLED

## 2024-10-26 SDOH — HEALTH STABILITY: MENTAL HEALTH: BEHAVIORS/MOOD: CALM;COOPERATIVE

## 2024-10-26 SDOH — HEALTH STABILITY: MENTAL HEALTH: IN THE PAST WEEK, HAVE YOU BEEN HAVING THOUGHTS ABOUT KILLING YOURSELF?: NO

## 2024-10-26 ASSESSMENT — COLUMBIA-SUICIDE SEVERITY RATING SCALE - C-SSRS
2. HAVE YOU ACTUALLY HAD ANY THOUGHTS OF KILLING YOURSELF?: NO
6. HAVE YOU EVER DONE ANYTHING, STARTED TO DO ANYTHING, OR PREPARED TO DO ANYTHING TO END YOUR LIFE?: NO
1. SINCE LAST CONTACT, HAVE YOU WISHED YOU WERE DEAD OR WISHED YOU COULD GO TO SLEEP AND NOT WAKE UP?: NO

## 2024-10-26 ASSESSMENT — PAIN SCALES - GENERAL: PAINLEVEL_OUTOF10: 0 - NO PAIN

## 2024-10-30 ENCOUNTER — APPOINTMENT (OUTPATIENT)
Dept: PRIMARY CARE | Facility: CLINIC | Age: 39
End: 2024-10-30
Payer: MEDICAID

## 2024-11-11 ENCOUNTER — APPOINTMENT (OUTPATIENT)
Dept: CARDIOLOGY | Facility: HOSPITAL | Age: 39
End: 2024-11-11
Payer: MEDICAID

## 2024-11-11 ENCOUNTER — HOSPITAL ENCOUNTER (EMERGENCY)
Facility: HOSPITAL | Age: 39
Discharge: OTHER INSTITUTION | End: 2024-11-12
Attending: EMERGENCY MEDICINE
Payer: MEDICAID

## 2024-11-11 DIAGNOSIS — F20.9 SCHIZOPHRENIA, UNSPECIFIED TYPE: ICD-10-CM

## 2024-11-11 DIAGNOSIS — F99 PSYCHIATRIC COMPLAINT: Primary | ICD-10-CM

## 2024-11-11 LAB
ALBUMIN SERPL BCP-MCNC: 4 G/DL (ref 3.4–5)
ALP SERPL-CCNC: 59 U/L (ref 33–120)
ALT SERPL W P-5'-P-CCNC: 10 U/L (ref 10–52)
AMPHETAMINES UR QL SCN: ABNORMAL
ANION GAP SERPL CALC-SCNC: 10 MMOL/L (ref 10–20)
APAP SERPL-MCNC: <10 UG/ML
APPEARANCE UR: CLEAR
AST SERPL W P-5'-P-CCNC: 11 U/L (ref 9–39)
BARBITURATES UR QL SCN: ABNORMAL
BASOPHILS # BLD AUTO: 0.02 X10*3/UL (ref 0–0.1)
BASOPHILS NFR BLD AUTO: 0.5 %
BENZODIAZ UR QL SCN: ABNORMAL
BILIRUB SERPL-MCNC: 0.3 MG/DL (ref 0–1.2)
BILIRUB UR STRIP.AUTO-MCNC: NEGATIVE MG/DL
BUN SERPL-MCNC: 8 MG/DL (ref 6–23)
BZE UR QL SCN: ABNORMAL
CALCIUM SERPL-MCNC: 8.6 MG/DL (ref 8.6–10.3)
CANNABINOIDS UR QL SCN: ABNORMAL
CHLORIDE SERPL-SCNC: 109 MMOL/L (ref 98–107)
CK SERPL-CCNC: 121 U/L (ref 0–325)
CO2 SERPL-SCNC: 25 MMOL/L (ref 21–32)
COLOR UR: COLORLESS
CREAT SERPL-MCNC: 1 MG/DL (ref 0.5–1.3)
EGFRCR SERPLBLD CKD-EPI 2021: >90 ML/MIN/1.73M*2
EOSINOPHIL # BLD AUTO: 0.1 X10*3/UL (ref 0–0.7)
EOSINOPHIL NFR BLD AUTO: 2.4 %
ERYTHROCYTE [DISTWIDTH] IN BLOOD BY AUTOMATED COUNT: 12.7 % (ref 11.5–14.5)
ETHANOL SERPL-MCNC: <10 MG/DL
FENTANYL+NORFENTANYL UR QL SCN: ABNORMAL
GLUCOSE SERPL-MCNC: 103 MG/DL (ref 74–99)
GLUCOSE UR STRIP.AUTO-MCNC: NORMAL MG/DL
HCT VFR BLD AUTO: 42.5 % (ref 41–52)
HGB BLD-MCNC: 13.8 G/DL (ref 13.5–17.5)
IMM GRANULOCYTES # BLD AUTO: 0.01 X10*3/UL (ref 0–0.7)
IMM GRANULOCYTES NFR BLD AUTO: 0.2 % (ref 0–0.9)
KETONES UR STRIP.AUTO-MCNC: NEGATIVE MG/DL
LEUKOCYTE ESTERASE UR QL STRIP.AUTO: NEGATIVE
LYMPHOCYTES # BLD AUTO: 1.34 X10*3/UL (ref 1.2–4.8)
LYMPHOCYTES NFR BLD AUTO: 32.1 %
MCH RBC QN AUTO: 28.2 PG (ref 26–34)
MCHC RBC AUTO-ENTMCNC: 32.5 G/DL (ref 32–36)
MCV RBC AUTO: 87 FL (ref 80–100)
METHADONE UR QL SCN: ABNORMAL
MONOCYTES # BLD AUTO: 0.36 X10*3/UL (ref 0.1–1)
MONOCYTES NFR BLD AUTO: 8.6 %
NEUTROPHILS # BLD AUTO: 2.35 X10*3/UL (ref 1.2–7.7)
NEUTROPHILS NFR BLD AUTO: 56.2 %
NITRITE UR QL STRIP.AUTO: NEGATIVE
NRBC BLD-RTO: 0 /100 WBCS (ref 0–0)
OPIATES UR QL SCN: ABNORMAL
OXYCODONE+OXYMORPHONE UR QL SCN: ABNORMAL
PCP UR QL SCN: ABNORMAL
PH UR STRIP.AUTO: 7 [PH]
PLATELET # BLD AUTO: 220 X10*3/UL (ref 150–450)
POTASSIUM SERPL-SCNC: 3.9 MMOL/L (ref 3.5–5.3)
PROT SERPL-MCNC: 5.8 G/DL (ref 6.4–8.2)
PROT UR STRIP.AUTO-MCNC: NEGATIVE MG/DL
RBC # BLD AUTO: 4.89 X10*6/UL (ref 4.5–5.9)
RBC # UR STRIP.AUTO: NEGATIVE /UL
SALICYLATES SERPL-MCNC: <3 MG/DL
SODIUM SERPL-SCNC: 140 MMOL/L (ref 136–145)
SP GR UR STRIP.AUTO: 1.01
UROBILINOGEN UR STRIP.AUTO-MCNC: NORMAL MG/DL
WBC # BLD AUTO: 4.2 X10*3/UL (ref 4.4–11.3)

## 2024-11-11 PROCEDURE — 80307 DRUG TEST PRSMV CHEM ANLYZR: CPT | Performed by: EMERGENCY MEDICINE

## 2024-11-11 PROCEDURE — 81003 URINALYSIS AUTO W/O SCOPE: CPT | Mod: 59 | Performed by: EMERGENCY MEDICINE

## 2024-11-11 PROCEDURE — 99285 EMERGENCY DEPT VISIT HI MDM: CPT | Mod: 25

## 2024-11-11 PROCEDURE — 85025 COMPLETE CBC W/AUTO DIFF WBC: CPT | Performed by: EMERGENCY MEDICINE

## 2024-11-11 PROCEDURE — 82550 ASSAY OF CK (CPK): CPT | Performed by: EMERGENCY MEDICINE

## 2024-11-11 PROCEDURE — 36415 COLL VENOUS BLD VENIPUNCTURE: CPT | Performed by: EMERGENCY MEDICINE

## 2024-11-11 PROCEDURE — 80053 COMPREHEN METABOLIC PANEL: CPT | Performed by: EMERGENCY MEDICINE

## 2024-11-11 PROCEDURE — 93005 ELECTROCARDIOGRAM TRACING: CPT

## 2024-11-11 PROCEDURE — 80320 DRUG SCREEN QUANTALCOHOLS: CPT | Performed by: EMERGENCY MEDICINE

## 2024-11-11 SDOH — HEALTH STABILITY: MENTAL HEALTH: IN THE PAST WEEK, HAVE YOU BEEN HAVING THOUGHTS ABOUT KILLING YOURSELF?: NO

## 2024-11-11 SDOH — ECONOMIC STABILITY: HOUSING INSECURITY: FEELS SAFE LIVING IN HOME: YES

## 2024-11-11 SDOH — HEALTH STABILITY: MENTAL HEALTH: NON-SPECIFIC ACTIVE SUICIDAL THOUGHTS (PAST 1 MONTH): NO

## 2024-11-11 SDOH — HEALTH STABILITY: MENTAL HEALTH: WISH TO BE DEAD (PAST 1 MONTH): YES

## 2024-11-11 SDOH — HEALTH STABILITY: MENTAL HEALTH: SUICIDAL BEHAVIOR (3 MONTHS): YES

## 2024-11-11 SDOH — HEALTH STABILITY: MENTAL HEALTH: IN THE PAST FEW WEEKS, HAVE YOU WISHED YOU WERE DEAD?: YES

## 2024-11-11 SDOH — HEALTH STABILITY: MENTAL HEALTH: ANXIETY SYMPTOMS: GENERALIZED

## 2024-11-11 SDOH — HEALTH STABILITY: MENTAL HEALTH: HAVE YOU EVER TRIED TO KILL YOURSELF?: YES

## 2024-11-11 SDOH — HEALTH STABILITY: MENTAL HEALTH: SUICIDAL BEHAVIOR (LIFETIME): YES

## 2024-11-11 SDOH — HEALTH STABILITY: MENTAL HEALTH: IN THE PAST FEW WEEKS, HAVE YOU FELT THAT YOU OR YOUR FAMILY WOULD BE BETTER OFF IF YOU WERE DEAD?: NO

## 2024-11-11 SDOH — HEALTH STABILITY: MENTAL HEALTH: DEPRESSION SYMPTOMS: NO PROBLEMS REPORTED OR OBSERVED.

## 2024-11-11 ASSESSMENT — LIFESTYLE VARIABLES
PRESCIPTION_ABUSE_PAST_12_MONTHS: YES
SUBSTANCE_ABUSE_PAST_12_MONTHS: YES

## 2024-11-11 ASSESSMENT — COLUMBIA-SUICIDE SEVERITY RATING SCALE - C-SSRS
2. HAVE YOU ACTUALLY HAD ANY THOUGHTS OF KILLING YOURSELF?: NO
1. IN THE PAST MONTH, HAVE YOU WISHED YOU WERE DEAD OR WISHED YOU COULD GO TO SLEEP AND NOT WAKE UP?: NO
6. HAVE YOU EVER DONE ANYTHING, STARTED TO DO ANYTHING, OR PREPARED TO DO ANYTHING TO END YOUR LIFE?: NO
6. HAVE YOU EVER DONE ANYTHING, STARTED TO DO ANYTHING, OR PREPARED TO DO ANYTHING TO END YOUR LIFE?: NO
2. HAVE YOU ACTUALLY HAD ANY THOUGHTS OF KILLING YOURSELF?: NO
1. SINCE LAST CONTACT, HAVE YOU WISHED YOU WERE DEAD OR WISHED YOU COULD GO TO SLEEP AND NOT WAKE UP?: NO

## 2024-11-11 ASSESSMENT — ACTIVITIES OF DAILY LIVING (ADL): LACK_OF_TRANSPORTATION: PATIENT UNABLE TO ANSWER

## 2024-11-11 NOTE — ED TRIAGE NOTES
Patient to ED for pyschiatric eval. Patients moms called police and stated patient is off his medications

## 2024-11-11 NOTE — PROGRESS NOTES
EPAT - Social Work Psychiatric Assessment    Arrival Details  Mode of Arrival: Ambulatory  Admission Source: Home  Admission Type: Involuntary  EPAT Assessment Start Date: 11/11/24  EPAT Assessment Start Time: 1200  Name of : Nadir Georgeeen    History of Present Illness  Admission Reason: medication non compliance  HPI: Patient has a history  of medication non compliance. He has frequented the ED several times this past year for Psychosis and SI. Today he denies any suicidal thoughts. He has had two inpatient stays in the past two month. A review of his Triage, Provider and [place] was conducted.    SW Readmission Information   Readmission within 30 Days: No    Psychiatric Symptoms  Anxiety Symptoms: Generalized  Depression Symptoms: No problems reported or observed.  [place] Symptoms: Pressured speech    Psychosis Symptoms  Hallucination Type: No problems reported or observed.  Delusion Type: No problems reported or observed.    Additional Symptoms - Adult  Generalized Anxiety Disorder: Excessive anxiety/worry  Obsessive Compulsive Disorder: No problems reported or observed.  Panic Attack: No problems reported or observed.  Post Traumatic Stress Disorder: No problems reported or observed.  Delirium: No problems reported or observed.  Review of Symptoms Comments: Please see above    Past Psychiatric History/Meds/Treatments  Past Psychiatric History: Patient has a history of Schizoaffective Disorder, Depression and Polysubstance use. He sees outpatient provider's at [facility] but is non compliant with appointments and his medications. He reports a history of self harm.  Past Psychiatric Meds/Treatments: see med list. Patient reports non compliance.    Current Mental Health Contacts   Name/Phone Number: [facility]   Last Appointment Date: unknown  Provider Name/Phone Number: [facility]  Provider Last Appointment Date: Unknown         Living Arrangement: House    Home Safety  Feels  Safe Living in Home: Yes         [place] Service/Education History  Current or Previous  Service: None  Education Level: High school  History of Learning Problems: No  History of School Behavior Problems: No  School History: see above    Social/Cultural History  Social History: Patient's mother is legal guardian.  Important Activities: Other (Comment)    Legal  Legal Concerns: none    Drug Screening  Have you used any substances (canabis, cocaine, heroin, hallucinogens, inhalants, etc.) in the past 12 months?: Yes  Have you used any prescription drugs other than prescribed in the past 12 months?: Yes  Is a toxicology screen needed?: Yes    Stage of Change  Stage of Change: Precontemplation  History of Treatment: Other (Comment)  Type of Treatment Offered: Inpatient, IOP, Individual, AA/NA meeting resource  Treatment Offered: Declined  Duration of Substance Use: Daily/Semi weekly  Frequency of Substance Use: see above  Age of First Substance Use: n/a    Behavioral Health  Behavioral Health(WDL): Exceptions to WDL  Behaviors/Mood: Anxious, Cooperative, Restless  Affect: Inconsistent with mood  Parent/Guardian/Significant Other Involvement: Attentive to patient needs  Family Behaviors: Appropriate for situation  Visitor Behaviors: Appropriate for situation  Needs Expressed: Other (Comment)  Emotional Support Given: Other (Comment)    Orientation  Orientation Level: Oriented X4    General Appearance  Motor Activity: Restlessness  Speech Pattern: Other (Comment)  General Attitude: Cooperative  Appearance/Hygiene: Disheveled, Body odor    Thought Process  Coherency: Disorganized  Content: Unremarkable  Delusions: Controlled  Perception: Not altered  Hallucination: None  Judgment/Insight: Poor  Confusion: None  Cognition: Follows commands, Poor judgement    Sleep Pattern  Sleep Pattern: Difficulty falling asleep    Risk Factors  Self Harm/Suicidal Ideation Plan: Patient currently denies  Previous Self Harm/Suicidal  Plans: past attempts  Risk Factors: None    Violence Risk Assessment  Assessment of Violence: None noted  Thoughts of Harm to Others: No    Ability to Assess Risk Screen  Risk Screen - Ability to Assess: Able to be screened  Ask Suicide-Screening Questions  1. In the past few weeks, have you wished you were dead?: Yes  2. In the past few weeks, have you felt that you or your family would be better off if you were dead?: No  3. In the past week, have you been having thoughts about killing yourself?: No  4. Have you ever tried to kill yourself?: Yes  Calculated Risk Score: Potential Risk  [place] Suicide Severity Rating Scale (Screener/Recent Self-Report)  1. Wish to be Dead (Past 1 Month): Yes  2. Non-Specific Active Suicidal Thoughts (Past 1 Month): No  6. Suicidal Behavior (Lifetime): Yes  6. Suicidal Behavior (3 Months): Yes  Calculated C-SSRS Risk Score (Lifetime/Recent): High Risk  Step 1: Risk Factors  Current & Past Psychiatric Dx: Mood disorder, Psychotic disorder, Alcohol/substance abuse disorders  Presenting Symptoms: Impulsivity, Anxiety and/or panic, Psychosis  Family History: Other (Comment)  Precipitants/Stressors: Triggering events leading to humiliation, shame, and/or despair (e.g. loss of relationship, financial or health status) (real or anticipated)  Change in Treatment: Non-compliant or not receiving treatment  Access to Lethal Methods : No  Step 2: Protective Factors   Protective Factors Internal: Other (Comment)  Protective Factors External: Cultural, spiritual and/or moral attitudes against suicide  Step 3: Suicidal Ideation Intensity  How Many Times Have You Had These Thoughts: Less than once a week  When You Have the Thoughts How Long do They Last : Fleeting - few seconds or minutes  Could/Can You Stop Thinking About Killing Yourself or Wanting to Die if You Want to: Does not attempt to control thoughts  Are There Things - Anyone or Anything - That Stopped You From Wanting to Die or Acting  on: Does not apply  What Sort of Reasons Did You Have For Thinking About Wanting to Die or Killing Yourself: Does not apply  Total Score: 2  Step 5: Documentation  Risk Level: Moderate suicide risk (Patient is moderate Dr. Vasquez agrees.)    Psychiatric Impression and Plan of Care  Assessment and Plan: Patient is a 39 year old male who presented to the ED via EMS. His mother called concerned he was not stable. She stated he stopped taking his medications. The patient was observed being non linear and having pressured speech. He has a history of non compliance with medications and all other treatment. He has had two prior inpatient stays in the past two months. He lives with his mother who is his guardian. She told ED staff the patient is not sleeping and poor hygiene. The patient appears disheveled, poor insight. He has poor coping skills, eye sight and body language. Overall the patient has decompensated and will need placement for stabilization.  Specific Resources Provided to Patient: [facility] CM Notified: no  PHP/IOP Recommended: none  Specific Information Provided for PHP/IOP: none  Plan Comments: inpatient    Outcome/Disposition  Patient's Perception of Outcome Achieved: n/a  Assessment, Recommendations and Risk Level Reviewed with: inpatient  Contact Name: Cady Nash  Contact Number(s): [phone]  Contact Relationship: guardian  EPAT Assessment Completed Date: 11/11/24  EPAT Assessment Completed Time: 9854

## 2024-11-11 NOTE — PROGRESS NOTES
Transitional Care Coordination Progress Note:  Plan per Medical/Surgical team: treatment of psychosis   Status: ED  Payor source: [employer] medicaid  Discharge disposition: Home with family  Patient’s mother/guardian, Cady Nash [phone]   Potential Barriers: hx of paranoid bipolar schizophrenia, polysubstance abuse   ADOD: 11/12/2024   DUARTE Ramirez RN, BSN Transitional Care Coordinator ED# [phone]      11/11/24 1114   Discharge Planning   Living Arrangements Family members;Parent   Support Systems Parent   Assistance Needed psych eval   Type of Residence Private residence   Number of Stairs to Enter Residence 1   Number of Stairs Within Residence 15   Home or Post Acute Services Community services   Expected Discharge Disposition Psych   Does the patient need discharge transport arranged? Yes   RoundTrip coordination needed? Yes   Has discharge transport been arranged? No   Financial Resource Strain   How hard is it for you to pay for the very basics like food, housing, medical care, and heating? Pt Unable   Housing Stability   In the last 12 months, was there a time when you were not able to pay the mortgage or rent on time? Pt Unable   In the past 12 months, how many times have you moved where you were living? 1   At any time in the past 12 months, were you homeless or living in a shelter (including now)? Pt Unable   Transportation Needs   In the past 12 months, has lack of transportation kept you from medical appointments or from getting medications? Pt Unable   In the past 12 months, has lack of transportation kept you from meetings, work, or from getting things needed for daily living? Pt Unable

## 2024-11-11 NOTE — ED PROVIDER NOTES
HPI   No chief complaint on file.      HPI  Patient is a 39-year-old male with a reported history of psychosis who presents to the emergency room with a chief complaint of psychotic behavior needing psychiatric evaluation.  No history is available in excess of that.  Patient has very  Nonlinear thinking and is not responding to questions at this time initially not really being compliant with interview.  Per report mother called in for the psychotic behavior but did not provide any details and PD brought him here.  He denies any physical complaints at this time.      PMHx: As above  PSHx: Denies  FamilyHx: Denies  SocialHx: denies but per chart polysubstance abuse  Allergies: lactase  Medications: See Medication Reconciliation     ROS  As above otherwise denies    Physical Exam    GENERAL: Awake and Alert, No Acute Distress  HEENT: AT/NC, PERRL, EOMI, Normal Oropharynx, No Signs of Dehydration  NECK: Normal Inspection, No JVD  CARDIOVASCULAR: RRR, No M/R/G  RESPIRATORY: CTA Bilaterally, No Wheezes, Rales or Rhonchi, Chest Wall Non-tender  ABDOMEN: Soft, non-tender abdomen, Normal Bowel Sounds, No Distention  BACK: No CVA Tenderness  SKIN: Normal Color, Warm, Dry, No Rashes   EXTREMITIES: Non-Tender, Full ROM, No Pedal Edema  NEURO: A&O x person, pressured speech, nonlinear thinking, delusional thoughts    Nursing Assessment and Vitals Reviewed    EKG showed a normal sinus rhythm at 79 bpm.  No significant interval punctations or ischemic ST changes.  No T wave inversions or axis deviation.    Medical Decision  Patient is a 39-year-old male with a reported history of psychosis who presents to the emergency room with a chief complaint of psychotic behavior needing psychiatric evaluation.  No history is available in excess of that.  Patient has very  Nonlinear thinking and is not responding to questions at this time initially not really being compliant with interview.  Per report mother called in for the psychotic  behavior but did not provide any details and PD brought him here.  He denies any physical complaints at this time.    On evaluation patient is well-appearing and in no acute distress without signs of traumatic injury.  He does have pressured speech, nonmilitary thinking and delusional thoughts.  There is concern for need for psychiatric evaluation and a workup will be performed.    Review of records show that patient was seen on October 25 for erratic behavior.  At that time patient was reportedly off his meds and using crack cocaine.    Workup for patient included labs that did not reveal any emergent electrolyte imbalance.  He has no leukocytosis or anemia.  Urinalysis showed no signs of infection but were positive for cannabinoids and cocaine.  CK currently added and pending.  Patient was evaluated by emergency psychiatric facility who determined he meets inpatient hospitalization due to decompensation of psychiatric condition.  He remains in stable condition at this time awaiting accepting facility. CK resulted within normal limits.  Patient is medically cleared for placement by emergency psychiatric team.                  Patient History   No past medical history on file.  No past surgical history on file.  No family history on file.  Social History     Tobacco Use    Smoking status: Every Day     Current packs/day: 1.00     Types: Cigarettes    Smokeless tobacco: Not on file   Substance Use Topics    Alcohol use: Yes     Alcohol/week: 1.0 standard drink of alcohol     Types: 1 Shots of liquor per week    Drug use: Not on file       Physical Exam   ED Triage Vitals   Temp Pulse Resp BP   -- -- -- --      SpO2 Temp src Heart Rate Source Patient Position   -- -- -- --      BP Location FiO2 (%)     -- --       Physical Exam      ED Course & MDM   Diagnoses as of 11/30/24 0331   Psychiatric complaint   Schizophrenia, unspecified type                 No data recorded                                 Medical Decision  Making      Procedure  Procedures     Gloria Hdez MD  11/11/24 1402       Gloria Hdez MD  11/11/24 1405       Gloria Hdez MD  11/11/24 1553       Gloria Hdez MD  11/30/24 2248       Gloria Hdez MD  11/30/24 2253     15

## 2024-11-12 VITALS
OXYGEN SATURATION: 97 % | BODY MASS INDEX: 21.78 KG/M2 | RESPIRATION RATE: 18 BRPM | DIASTOLIC BLOOD PRESSURE: 74 MMHG | HEIGHT: 68 IN | TEMPERATURE: 98.1 F | HEART RATE: 81 BPM | WEIGHT: 143.74 LBS | SYSTOLIC BLOOD PRESSURE: 139 MMHG

## 2024-11-12 PROCEDURE — S4991 NICOTINE PATCH NONLEGEND: HCPCS | Performed by: EMERGENCY MEDICINE

## 2024-11-12 PROCEDURE — 2500000002 HC RX 250 W HCPCS SELF ADMINISTERED DRUGS (ALT 637 FOR MEDICARE OP, ALT 636 FOR OP/ED): Performed by: EMERGENCY MEDICINE

## 2024-11-12 RX ORDER — IBUPROFEN 200 MG
1 TABLET ORAL DAILY
Status: DISCONTINUED | OUTPATIENT
Start: 2024-11-12 | End: 2024-11-12 | Stop reason: HOSPADM

## 2024-11-12 RX ADMIN — NICOTINE 1 PATCH: 21 PATCH, EXTENDED RELEASE TRANSDERMAL at 09:54

## 2024-11-12 SDOH — HEALTH STABILITY: MENTAL HEALTH: BEHAVIORAL HEALTH(WDL): EXCEPTIONS TO WDL

## 2024-11-12 SDOH — HEALTH STABILITY: MENTAL HEALTH
OTHER SUICIDE PRECAUTIONS INCLUDE: PATIENT PLACED IN AN EASILY OBSERVABLE ROOM WITH DOOR/CURTAIN REMAINING OPEN;PATIENT PLACED IN GOWN (SNAPS OR PAPER GOWNS PREFERRED) AND WANDED

## 2024-11-12 ASSESSMENT — PAIN - FUNCTIONAL ASSESSMENT: PAIN_FUNCTIONAL_ASSESSMENT: 0-10

## 2024-11-12 ASSESSMENT — COLUMBIA-SUICIDE SEVERITY RATING SCALE - C-SSRS
2. HAVE YOU ACTUALLY HAD ANY THOUGHTS OF KILLING YOURSELF?: NO
1. SINCE LAST CONTACT, HAVE YOU WISHED YOU WERE DEAD OR WISHED YOU COULD GO TO SLEEP AND NOT WAKE UP?: NO
6. HAVE YOU EVER DONE ANYTHING, STARTED TO DO ANYTHING, OR PREPARED TO DO ANYTHING TO END YOUR LIFE?: NO

## 2024-11-12 ASSESSMENT — PAIN SCALES - GENERAL: PAINLEVEL_OUTOF10: 0 - NO PAIN

## 2024-11-12 NOTE — PROGRESS NOTES
11/12/24 0948   Discharge Planning   Assistance Needed accepted @ [facility]   Expected Discharge Disposition Psych   Does the patient need discharge transport arranged? Yes   RoundTrip coordination needed? Yes   Has discharge transport been arranged? Yes   What day is the transport expected? 11/12/24

## 2024-11-12 NOTE — PROGRESS NOTES
Please see the previous ED provider note for HPI, PE and MDM up to the time of shift change.    Medically cleared.  Evaluated by [facility] who recommended inpatient psychiatric placement for decompensated schizophrenia.  Accepted at [facility].    Diagnoses as of 11/12/24 0922   Psychiatric complaint   Schizophrenia, unspecified type

## 2024-11-12 NOTE — PROGRESS NOTES
Received patient in signout from Dr. ruiz. In short the patient is a 39 y.o. male presenting with concern for decompensated psychiatric illness. In the ED, screening evaluation largely unremarkable, was medically cleared by prior physician and was evaluated by EP[facility] who recommended placement.  Patient signed out to me pending placement.  At time of signout, patient still pending EP[facility] placement.. Patient signed out in stable condition pending placement for psychiatric stabilization.  Will monitor closely pending placement..

## 2024-11-12 NOTE — PROGRESS NOTES
Pharmacy Medication History Review    Called [facility] they filled the following:  Benztropine  Buspar  Depakote 500 twice a day  Gabapentin  Haloperidol injection  Loxitane  Mirtazapine    [facility] filled the following:  Wellbutrin  Depakote 500 3 at bedtime  Haldol tablet  Zyprexa  Propranolol  Trazodone       Jesse Rico is a 39 y.o. male admitted for No Principal Problem: There is no principal problem currently on the Problem List. Please update the Problem List and refresh.. Pharmacy reviewed the patient's [id number] medications and allergies for accuracy.    The list below reflectives the updated PTA list. Please review each medication in order reconciliation for additional clarification and justification.       The list below reflectives the updated allergy list. Please review each documented allergy for additional clarification and justification.  Allergies  Reviewed by Clint Galindo RN on 10/25/2024        Severity Reactions Comments    Lactase Not Specified Nausea And Vomiting, Unknown             Below are additional concerns with the patient's PTA list.  Prior to Admission Medications   Prescriptions Last Dose Informant   OLANZapine (ZyPREXA) 10 mg tablet     Sig: Take 1 tablet (10 mg) by mouth once daily at bedtime.   benztropine (Cogentin) 1 mg tablet  Other   Sig: Take 1 tablet (1 mg) by mouth 2 times a day.   buPROPion XL (Wellbutrin XL) 150 mg 24 hr tablet  Other   Sig: Take 1 tablet (150 mg) by mouth once daily in the morning.   busPIRone (Buspar) 10 mg tablet  Other   Sig: Take 1 tablet (10 mg) by mouth 2 times a day.   divalproex (Depakote ER) 500 mg 24 hr tablet     Sig: Take 1 tablet (500 mg) by mouth 2 times a day. Do not crush, chew, or split. In the morning   divalproex (Depakote ER) 500 mg 24 hr tablet     Sig: Take 3 tablets (1,500 mg) by mouth once daily at bedtime. At 2pm and 9pm   gabapentin (Neurontin) 100 mg capsule  Other   Sig: Take 1 capsule (100 mg) by mouth 3 times a day.    haloperidol (Haldol) 5 mg tablet     Sig: Take 1 tablet (5 mg) by mouth 2 times a day.   haloperidol decanoate (Haldol Decanoate) 100 mg/mL injection  Other   Sig: Inject 2 mL (200 mg) into the muscle every 28 (twenty-eight) days.   loxapine (Loxitane) 50 mg capsule  Other   Sig: Take 1 capsule (50 mg) by mouth 2 times a day.   mirtazapine (Remeron) 30 mg tablet  Other   Sig: Take 1 tablet (30 mg) by mouth once daily at bedtime.   propranolol (Inderal) 10 mg tablet  Other   Sig: Take 1 tablet (10 mg) by mouth 2 times a day.   traZODone (Desyrel) 150 mg tablet     Sig: Take 1 tablet (150 mg) by mouth once daily at bedtime.      Facility-Administered Medications: None        Carrol Hickman

## 2024-11-12 NOTE — SIGNIFICANT EVENT
Application for Emergency Admission      Ready for Transfer?  Is the patient medically cleared for transfer to inpatient psychiatry: Yes  Has the patient been accepted to an inpatient psychiatric hospital: Yes    Application for Emergency Admission  IN ACCORDANCE WITH [place]  The Chief Clinical Officer of: [facility] 11/12/2024 .9:21 AM    Reason for Hospitalization  The undersigned has reason to believe that: Jesse Rico Is a mentally ill person subject to hospitalization by court order under division B Section 5122.01 of the Revised Code, i.e., this person:    1.No  Represents a substantial risk of physical harm to self as manifested by evidence of threats of, or attempts at, suicide or serious self-inflicted bodily harm    2.No Represents a substantial risk of physical harm to others as manifested by evidence of recent homicidal or other violent behavior, evidence of recent threats that place another in reasonable fear of violent behavior and serious physical harm, or other evidence of present dangerousness    3.Yes Represents a substantial and immediate risk of serious physical impairment or injury to self as manifested by  evidence that the person is unable to provide for and is not providing for the person's basic physical needs because of the person's mental illness and that appropriate provision for those needs cannot be made  immediately available in the community    4.No Would benefit from treatment in a hospital for his mental illness and is in need of such treatment as manifested by evidence of behavior that creates a grave and imminent risk to substantial rights of others or  himself.    5.No Would benefit from treatment as manifested by evidence of behavior that indicates all of the following:       (a) The person is unlikely to survive safely in the community without supervision, based on a clinical determination.       (b) The person has a history of lack of compliance with  treatment for mental illness and one of the following applies:      (i) At least twice within the thirty-six months prior to the filing of an affidavit seeking court-ordered treatment of the person under section 5122.111 of the Revised Code, the lack of compliance has been a significant factor in necessitating hospitalization in a hospital or receipt of services in a forensic or other mental health unit of a correctional facility, provided that the thirty-six-month period shall be extended by the length of any hospitalization or incarceration of the person that occurred within the thirty-six-month period.      (ii) Within the forty-eight months prior to the filing of an affidavit seeking court-ordered treatment of the person under section 5122.111 of the Revised Code, the lack of compliance resulted in one or more acts of serious violent behavior toward self or others or threats of, or attempts at, serious physical harm to self or others, provided that the forty-eight-month period shall be extended by the length of any hospitalization or incarceration of the person that occurred within the forty-eight-month period.      (c) The person, as a result of mental illness, is unlikely to voluntarily participate in necessary treatment.       (d) In view of the person's treatment history and current behavior, the person is in need of treatment in order to prevent a relapse or deterioration that would be likely to result in substantial risk of serious harm to the person or others.    (e) Represents a substantial risk of physical harm to self or others if allowed to remain at liberty pending examination.    Therefore, it is requested that said person be admitted to the above named facility.    STATEMENT OF BELIEF    Must be filled out by one of the following: a psychiatrist, licensed physician, licensed clinical psychologist, health or ,  or .  (Statement shall include the circumstances under  which the individual was taken into custody and the reason for the person's belief that hospitalization is necessary. The statement shall also include a reference to efforts made to secure the individual's property at his residence if he was taken into custody there. Every reasonable and appropriate effort should be made to take this person into custody in the least conspicuous manner possible.)    Decompensated schizophrenia    Tavares Gama MD 11/12/2024     _____________________________________________________________   Place of Employment: [employer] [employer]    STATEMENT OF OBSERVATION BY PSYCHIATRIST, LICENSED PHYSICIAN, OR LICENSED CLINICAL PSYCHOLOGIST, IF APPLICABLE    Place of Observation (e.g., [facility] mental health center, general hospital, office, emergency facility)  (If applicable, please complete)    Tavares Gama MD 11/12/2024    _____________________________________________________________

## 2024-11-12 NOTE — ED NOTES
Report and given to  RN a this time. Pt with VSS and no significant changes prior to patient handoff. Neuro/skin/cardiac/respiratory grossly WNL.        Gabrielle Spatz, RN  11/12/24 7736

## 2024-11-12 NOTE — ED NOTES
Pt resting in stretcher at this time with even and unlabored respirations. Pt with urinal and fluids in reach should they be needed by patient. Pt continues to be in line of sight of this RN.      Gabrielle Spatz, RN  11/12/24 9380

## 2024-11-12 NOTE — ED NOTES
This RN assumed care from SHUBHAM Yarbrough at this time after receiving report.        Gabrielle Spatz, RN  11/12/24 0645

## 2024-11-15 LAB
ATRIAL RATE: 79 BPM
P AXIS: 39 DEGREES
P OFFSET: 196 MS
P ONSET: 155 MS
PR INTERVAL: 130 MS
Q ONSET: 220 MS
QRS COUNT: 12 BEATS
QRS DURATION: 84 MS
QT INTERVAL: 364 MS
QTC CALCULATION(BAZETT): 417 MS
QTC FREDERICIA: 399 MS
R AXIS: 72 DEGREES
T AXIS: 46 DEGREES
T OFFSET: 402 MS
VENTRICULAR RATE: 79 BPM

## 2024-11-24 ENCOUNTER — APPOINTMENT (OUTPATIENT)
Dept: CARDIOLOGY | Facility: HOSPITAL | Age: 39
End: 2024-11-24
Payer: MEDICAID

## 2024-11-24 ENCOUNTER — HOSPITAL ENCOUNTER (OUTPATIENT)
Facility: HOSPITAL | Age: 39
Setting detail: OBSERVATION
End: 2024-11-24
Attending: STUDENT IN AN ORGANIZED HEALTH CARE EDUCATION/TRAINING PROGRAM | Admitting: INTERNAL MEDICINE
Payer: MEDICAID

## 2024-11-24 VITALS
SYSTOLIC BLOOD PRESSURE: 138 MMHG | HEART RATE: 79 BPM | DIASTOLIC BLOOD PRESSURE: 86 MMHG | OXYGEN SATURATION: 98 % | HEIGHT: 68 IN | WEIGHT: 145 LBS | RESPIRATION RATE: 19 BRPM | TEMPERATURE: 98.2 F | BODY MASS INDEX: 21.98 KG/M2

## 2024-11-24 DIAGNOSIS — T43.291A BUPROPION OVERDOSE, ACCIDENTAL OR UNINTENTIONAL, INITIAL ENCOUNTER: Primary | ICD-10-CM

## 2024-11-24 DIAGNOSIS — Z86.59 HISTORY OF SCHIZOAFFECTIVE DISORDER: ICD-10-CM

## 2024-11-24 DIAGNOSIS — F19.10 POLYSUBSTANCE ABUSE (MULTI): ICD-10-CM

## 2024-11-24 LAB
AMPHETAMINES UR QL SCN: ABNORMAL
ANION GAP SERPL CALC-SCNC: 13 MMOL/L (ref 10–20)
APAP SERPL-MCNC: <10 UG/ML
APPEARANCE UR: CLEAR
BARBITURATES UR QL SCN: ABNORMAL
BASOPHILS # BLD AUTO: 0.02 X10*3/UL (ref 0–0.1)
BASOPHILS NFR BLD AUTO: 0.3 %
BENZODIAZ UR QL SCN: ABNORMAL
BILIRUB UR STRIP.AUTO-MCNC: NEGATIVE MG/DL
BUN SERPL-MCNC: 6 MG/DL (ref 6–23)
BZE UR QL SCN: ABNORMAL
CALCIUM SERPL-MCNC: 9.1 MG/DL (ref 8.6–10.3)
CANNABINOIDS UR QL SCN: ABNORMAL
CHLORIDE SERPL-SCNC: 103 MMOL/L (ref 98–107)
CK SERPL-CCNC: 706 U/L (ref 0–325)
CO2 SERPL-SCNC: 23 MMOL/L (ref 21–32)
COLOR UR: NORMAL
CREAT SERPL-MCNC: 0.99 MG/DL (ref 0.5–1.3)
EGFRCR SERPLBLD CKD-EPI 2021: >90 ML/MIN/1.73M*2
EOSINOPHIL # BLD AUTO: 0.27 X10*3/UL (ref 0–0.7)
EOSINOPHIL NFR BLD AUTO: 4.4 %
ERYTHROCYTE [DISTWIDTH] IN BLOOD BY AUTOMATED COUNT: 12.2 % (ref 11.5–14.5)
ETHANOL SERPL-MCNC: <10 MG/DL
ETHANOL SERPL-MCNC: <10 MG/DL
FENTANYL+NORFENTANYL UR QL SCN: ABNORMAL
GLUCOSE SERPL-MCNC: 96 MG/DL (ref 74–99)
GLUCOSE UR STRIP.AUTO-MCNC: NORMAL MG/DL
HCT VFR BLD AUTO: 38.2 % (ref 41–52)
HGB BLD-MCNC: 12.7 G/DL (ref 13.5–17.5)
IMM GRANULOCYTES # BLD AUTO: 0.01 X10*3/UL (ref 0–0.7)
IMM GRANULOCYTES NFR BLD AUTO: 0.2 % (ref 0–0.9)
KETONES UR STRIP.AUTO-MCNC: NEGATIVE MG/DL
LEUKOCYTE ESTERASE UR QL STRIP.AUTO: NEGATIVE
LYMPHOCYTES # BLD AUTO: 1.76 X10*3/UL (ref 1.2–4.8)
LYMPHOCYTES NFR BLD AUTO: 28.5 %
MAGNESIUM SERPL-MCNC: 1.91 MG/DL (ref 1.6–2.4)
MCH RBC QN AUTO: 28.5 PG (ref 26–34)
MCHC RBC AUTO-ENTMCNC: 33.2 G/DL (ref 32–36)
MCV RBC AUTO: 86 FL (ref 80–100)
METHADONE UR QL SCN: ABNORMAL
MONOCYTES # BLD AUTO: 0.65 X10*3/UL (ref 0.1–1)
MONOCYTES NFR BLD AUTO: 10.5 %
NEUTROPHILS # BLD AUTO: 3.47 X10*3/UL (ref 1.2–7.7)
NEUTROPHILS NFR BLD AUTO: 56.1 %
NITRITE UR QL STRIP.AUTO: NEGATIVE
NRBC BLD-RTO: 0 /100 WBCS (ref 0–0)
OPIATES UR QL SCN: ABNORMAL
OXYCODONE+OXYMORPHONE UR QL SCN: ABNORMAL
PCP UR QL SCN: ABNORMAL
PH UR STRIP.AUTO: 6.5 [PH]
PLATELET # BLD AUTO: 199 X10*3/UL (ref 150–450)
POTASSIUM SERPL-SCNC: 3.8 MMOL/L (ref 3.5–5.3)
PROT UR STRIP.AUTO-MCNC: NEGATIVE MG/DL
RBC # BLD AUTO: 4.46 X10*6/UL (ref 4.5–5.9)
RBC # UR STRIP.AUTO: NEGATIVE /UL
SALICYLATES SERPL-MCNC: <3 MG/DL
SODIUM SERPL-SCNC: 135 MMOL/L (ref 136–145)
SP GR UR STRIP.AUTO: 1
UROBILINOGEN UR STRIP.AUTO-MCNC: NORMAL MG/DL
VALPROATE SERPL-MCNC: <10 UG/ML (ref 50–100)
WBC # BLD AUTO: 6.2 X10*3/UL (ref 4.4–11.3)

## 2024-11-24 PROCEDURE — G0378 HOSPITAL OBSERVATION PER HR: HCPCS

## 2024-11-24 PROCEDURE — 82077 ASSAY SPEC XCP UR&BREATH IA: CPT | Performed by: STUDENT IN AN ORGANIZED HEALTH CARE EDUCATION/TRAINING PROGRAM

## 2024-11-24 PROCEDURE — 99222 1ST HOSP IP/OBS MODERATE 55: CPT | Performed by: NURSE PRACTITIONER

## 2024-11-24 PROCEDURE — 93005 ELECTROCARDIOGRAM TRACING: CPT

## 2024-11-24 PROCEDURE — 80048 BASIC METABOLIC PNL TOTAL CA: CPT | Performed by: STUDENT IN AN ORGANIZED HEALTH CARE EDUCATION/TRAINING PROGRAM

## 2024-11-24 PROCEDURE — 2500000004 HC RX 250 GENERAL PHARMACY W/ HCPCS (ALT 636 FOR OP/ED): Performed by: NURSE PRACTITIONER

## 2024-11-24 PROCEDURE — 80307 DRUG TEST PRSMV CHEM ANLYZR: CPT | Performed by: STUDENT IN AN ORGANIZED HEALTH CARE EDUCATION/TRAINING PROGRAM

## 2024-11-24 PROCEDURE — 80164 ASSAY DIPROPYLACETIC ACD TOT: CPT | Performed by: STUDENT IN AN ORGANIZED HEALTH CARE EDUCATION/TRAINING PROGRAM

## 2024-11-24 PROCEDURE — 83735 ASSAY OF MAGNESIUM: CPT | Performed by: STUDENT IN AN ORGANIZED HEALTH CARE EDUCATION/TRAINING PROGRAM

## 2024-11-24 PROCEDURE — 99285 EMERGENCY DEPT VISIT HI MDM: CPT | Mod: 25

## 2024-11-24 PROCEDURE — 82550 ASSAY OF CK (CPK): CPT | Performed by: STUDENT IN AN ORGANIZED HEALTH CARE EDUCATION/TRAINING PROGRAM

## 2024-11-24 PROCEDURE — 81003 URINALYSIS AUTO W/O SCOPE: CPT | Performed by: STUDENT IN AN ORGANIZED HEALTH CARE EDUCATION/TRAINING PROGRAM

## 2024-11-24 PROCEDURE — 85025 COMPLETE CBC W/AUTO DIFF WBC: CPT | Performed by: STUDENT IN AN ORGANIZED HEALTH CARE EDUCATION/TRAINING PROGRAM

## 2024-11-24 PROCEDURE — 36415 COLL VENOUS BLD VENIPUNCTURE: CPT | Performed by: STUDENT IN AN ORGANIZED HEALTH CARE EDUCATION/TRAINING PROGRAM

## 2024-11-24 PROCEDURE — 80320 DRUG SCREEN QUANTALCOHOLS: CPT | Performed by: STUDENT IN AN ORGANIZED HEALTH CARE EDUCATION/TRAINING PROGRAM

## 2024-11-24 RX ORDER — SODIUM CHLORIDE 9 MG/ML
125 INJECTION, SOLUTION INTRAVENOUS CONTINUOUS
Status: DISCONTINUED | OUTPATIENT
Start: 2024-11-24 | End: 2024-11-25 | Stop reason: HOSPADM

## 2024-11-24 RX ORDER — LORAZEPAM 2 MG/ML
2 INJECTION INTRAMUSCULAR EVERY 5 MIN PRN
Status: DISCONTINUED | OUTPATIENT
Start: 2024-11-24 | End: 2024-11-25 | Stop reason: HOSPADM

## 2024-11-24 RX ADMIN — SODIUM CHLORIDE 125 ML/HR: 9 INJECTION, SOLUTION INTRAVENOUS at 23:13

## 2024-11-24 SDOH — ECONOMIC STABILITY: INCOME INSECURITY: IN THE PAST 12 MONTHS HAS THE ELECTRIC, GAS, OIL, OR WATER COMPANY THREATENED TO SHUT OFF SERVICES IN YOUR HOME?: NO

## 2024-11-24 SDOH — HEALTH STABILITY: MENTAL HEALTH: BEHAVIORAL HEALTH(WDL): EXCEPTIONS TO WDL

## 2024-11-24 SDOH — HEALTH STABILITY: MENTAL HEALTH: BEHAVIORS/MOOD: CALM;GUARDED;FLIGHT OF IDEAS

## 2024-11-24 SDOH — SOCIAL STABILITY: SOCIAL INSECURITY
WITHIN THE LAST YEAR, HAVE YOU BEEN RAPED OR FORCED TO HAVE ANY KIND OF SEXUAL ACTIVITY BY YOUR PARTNER OR EX-PARTNER?: NO

## 2024-11-24 SDOH — HEALTH STABILITY: MENTAL HEALTH: HALLUCINATION: AUDITORY

## 2024-11-24 SDOH — ECONOMIC STABILITY: FOOD INSECURITY: WITHIN THE PAST 12 MONTHS, THE FOOD YOU BOUGHT JUST DIDN'T LAST AND YOU DIDN'T HAVE MONEY TO GET MORE.: NEVER TRUE

## 2024-11-24 SDOH — SOCIAL STABILITY: SOCIAL INSECURITY: HAS ANYONE EVER THREATENED TO HURT YOUR FAMILY OR YOUR PETS?: NO

## 2024-11-24 SDOH — HEALTH STABILITY: MENTAL HEALTH: NEEDS EXPRESSED: EMOTIONAL

## 2024-11-24 SDOH — ECONOMIC STABILITY: HOUSING INSECURITY: AT ANY TIME IN THE PAST 12 MONTHS, WERE YOU HOMELESS OR LIVING IN A SHELTER (INCLUDING NOW)?: NO

## 2024-11-24 SDOH — SOCIAL STABILITY: SOCIAL INSECURITY: WITHIN THE LAST YEAR, HAVE YOU BEEN HUMILIATED OR EMOTIONALLY ABUSED IN OTHER WAYS BY YOUR PARTNER OR EX-PARTNER?: NO

## 2024-11-24 SDOH — SOCIAL STABILITY: SOCIAL INSECURITY: WERE YOU ABLE TO COMPLETE ALL THE BEHAVIORAL HEALTH SCREENINGS?: YES

## 2024-11-24 SDOH — ECONOMIC STABILITY: FOOD INSECURITY: WITHIN THE PAST 12 MONTHS, YOU WORRIED THAT YOUR FOOD WOULD RUN OUT BEFORE YOU GOT THE MONEY TO BUY MORE.: NEVER TRUE

## 2024-11-24 SDOH — HEALTH STABILITY: MENTAL HEALTH: HAVE YOU EVER DONE ANYTHING, STARTED TO DO ANYTHING, OR PREPARED TO DO ANYTHING TO END YOUR LIFE?: YES

## 2024-11-24 SDOH — ECONOMIC STABILITY: TRANSPORTATION INSECURITY: IN THE PAST 12 MONTHS, HAS LACK OF TRANSPORTATION KEPT YOU FROM MEDICAL APPOINTMENTS OR FROM GETTING MEDICATIONS?: NO

## 2024-11-24 SDOH — HEALTH STABILITY: MENTAL HEALTH: WAS THIS WITHIN THE PAST THREE MONTHS?: NO

## 2024-11-24 SDOH — SOCIAL STABILITY: SOCIAL INSECURITY: DOES ANYONE TRY TO KEEP YOU FROM HAVING/CONTACTING OTHER FRIENDS OR DOING THINGS OUTSIDE YOUR HOME?: NO

## 2024-11-24 SDOH — HEALTH STABILITY: MENTAL HEALTH: HAVE YOU ACTUALLY HAD ANY THOUGHTS OF KILLING YOURSELF?: NO

## 2024-11-24 SDOH — ECONOMIC STABILITY: HOUSING INSECURITY: IN THE LAST 12 MONTHS, WAS THERE A TIME WHEN YOU WERE NOT ABLE TO PAY THE MORTGAGE OR RENT ON TIME?: NO

## 2024-11-24 SDOH — SOCIAL STABILITY: SOCIAL INSECURITY
WITHIN THE LAST YEAR, HAVE YOU BEEN KICKED, HIT, SLAPPED, OR OTHERWISE PHYSICALLY HURT BY YOUR PARTNER OR EX-PARTNER?: NO

## 2024-11-24 SDOH — SOCIAL STABILITY: SOCIAL INSECURITY: HAVE YOU HAD ANY THOUGHTS OF HARMING ANYONE ELSE?: NO

## 2024-11-24 SDOH — SOCIAL STABILITY: SOCIAL INSECURITY: WITHIN THE LAST YEAR, HAVE YOU BEEN AFRAID OF YOUR PARTNER OR EX-PARTNER?: NO

## 2024-11-24 SDOH — HEALTH STABILITY: MENTAL HEALTH: SLEEP PATTERN: RESTLESSNESS

## 2024-11-24 SDOH — HEALTH STABILITY: MENTAL HEALTH: CONTENT: HYPERSEXUAL;PREOCCUPATION

## 2024-11-24 SDOH — SOCIAL STABILITY: SOCIAL INSECURITY: HAVE YOU HAD THOUGHTS OF HARMING ANYONE ELSE?: NO

## 2024-11-24 SDOH — ECONOMIC STABILITY: HOUSING INSECURITY: IN THE PAST 12 MONTHS, HOW MANY TIMES HAVE YOU MOVED WHERE YOU WERE LIVING?: 1

## 2024-11-24 SDOH — ECONOMIC STABILITY: FOOD INSECURITY: HOW HARD IS IT FOR YOU TO PAY FOR THE VERY BASICS LIKE FOOD, HOUSING, MEDICAL CARE, AND HEATING?: NOT HARD AT ALL

## 2024-11-24 SDOH — SOCIAL STABILITY: SOCIAL INSECURITY: ARE THERE ANY APPARENT SIGNS OF INJURIES/BEHAVIORS THAT COULD BE RELATED TO ABUSE/NEGLECT?: NO

## 2024-11-24 SDOH — SOCIAL STABILITY: SOCIAL INSECURITY: DO YOU FEEL ANYONE HAS EXPLOITED OR TAKEN ADVANTAGE OF YOU FINANCIALLY OR OF YOUR PERSONAL PROPERTY?: NO

## 2024-11-24 SDOH — HEALTH STABILITY: MENTAL HEALTH: DELUSIONS: OTHER (COMMENT)

## 2024-11-24 SDOH — HEALTH STABILITY: MENTAL HEALTH: SUICIDE ASSESSMENT: ADULT (C-SSRS)

## 2024-11-24 SDOH — SOCIAL STABILITY: SOCIAL INSECURITY: ARE YOU OR HAVE YOU BEEN THREATENED OR ABUSED PHYSICALLY, EMOTIONALLY, OR SEXUALLY BY ANYONE?: NO

## 2024-11-24 SDOH — SOCIAL STABILITY: SOCIAL INSECURITY: ABUSE: ADULT

## 2024-11-24 SDOH — SOCIAL STABILITY: SOCIAL INSECURITY: DO YOU FEEL UNSAFE GOING BACK TO THE PLACE WHERE YOU ARE LIVING?: NO

## 2024-11-24 SDOH — HEALTH STABILITY: MENTAL HEALTH: HAVE YOU WISHED YOU WERE DEAD OR WISHED YOU COULD GO TO SLEEP AND NOT WAKE UP?: NO

## 2024-11-24 ASSESSMENT — ACTIVITIES OF DAILY LIVING (ADL)
JUDGMENT_ADEQUATE_SAFELY_COMPLETE_DAILY_ACTIVITIES: YES
TOILETING: INDEPENDENT
GROOMING: INDEPENDENT
HEARING - LEFT EAR: FUNCTIONAL
LACK_OF_TRANSPORTATION: NO
FEEDING YOURSELF: INDEPENDENT
ADEQUATE_TO_COMPLETE_ADL: YES
DRESSING YOURSELF: INDEPENDENT
WALKS IN HOME: INDEPENDENT
PATIENT'S MEMORY ADEQUATE TO SAFELY COMPLETE DAILY ACTIVITIES?: NO
BATHING: INDEPENDENT
HEARING - RIGHT EAR: FUNCTIONAL
LACK_OF_TRANSPORTATION: PATIENT DECLINED

## 2024-11-24 ASSESSMENT — COLUMBIA-SUICIDE SEVERITY RATING SCALE - C-SSRS
2. HAVE YOU ACTUALLY HAD ANY THOUGHTS OF KILLING YOURSELF?: NO
6. HAVE YOU EVER DONE ANYTHING, STARTED TO DO ANYTHING, OR PREPARED TO DO ANYTHING TO END YOUR LIFE?: NO
1. SINCE LAST CONTACT, HAVE YOU WISHED YOU WERE DEAD OR WISHED YOU COULD GO TO SLEEP AND NOT WAKE UP?: NO
6. HAVE YOU EVER DONE ANYTHING, STARTED TO DO ANYTHING, OR PREPARED TO DO ANYTHING TO END YOUR LIFE?: NO
1. IN THE PAST MONTH, HAVE YOU WISHED YOU WERE DEAD OR WISHED YOU COULD GO TO SLEEP AND NOT WAKE UP?: NO
2. HAVE YOU ACTUALLY HAD ANY THOUGHTS OF KILLING YOURSELF?: NO

## 2024-11-24 ASSESSMENT — COGNITIVE AND FUNCTIONAL STATUS - GENERAL
PATIENT BASELINE BEDBOUND: NO
MOBILITY SCORE: 24
DAILY ACTIVITIY SCORE: 24
DAILY ACTIVITIY SCORE: 24
MOBILITY SCORE: 24

## 2024-11-24 ASSESSMENT — PAIN - FUNCTIONAL ASSESSMENT
PAIN_FUNCTIONAL_ASSESSMENT: 0-10
PAIN_FUNCTIONAL_ASSESSMENT: 0-10

## 2024-11-24 ASSESSMENT — LIFESTYLE VARIABLES
HOW MANY STANDARD DRINKS CONTAINING ALCOHOL DO YOU HAVE ON A TYPICAL DAY: PATIENT DOES NOT DRINK
HOW OFTEN DO YOU HAVE 6 OR MORE DRINKS ON ONE OCCASION: NEVER
HOW OFTEN DO YOU HAVE A DRINK CONTAINING ALCOHOL: NEVER
AUDIT-C TOTAL SCORE: 0
SKIP TO QUESTIONS 9-10: 1
AUDIT-C TOTAL SCORE: 0

## 2024-11-24 ASSESSMENT — PATIENT HEALTH QUESTIONNAIRE - PHQ9
1. LITTLE INTEREST OR PLEASURE IN DOING THINGS: NOT AT ALL
2. FEELING DOWN, DEPRESSED OR HOPELESS: NOT AT ALL
SUM OF ALL RESPONSES TO PHQ9 QUESTIONS 1 & 2: 0

## 2024-11-24 ASSESSMENT — PAIN SCALES - GENERAL
PAINLEVEL_OUTOF10: 0 - NO PAIN
PAINLEVEL_OUTOF10: 0 - NO PAIN

## 2024-11-24 NOTE — ED TRIAGE NOTES
Pt presents to ED via EMS from home after snorting approximately 10-20 pills of Wellbutrin to get high. Pt has an extensive psych history including bipolar disorder. Denies SI, but endorses HI without a plan. Pt is A&Ox3. VSS.

## 2024-11-24 NOTE — ED PROVIDER NOTES
Emergency Department Provider Note        History of Present Illness     Chief Complaint: Drug Overdose and Psychiatric Evaluation   History provided by: Patient and EMS  Limitations to History: None  External Chart Review: See ED Course    HPI:  Jesse Rico is a 39 y.o. male with PMHx of schizoaffective disorder, polysubstance abuse presenting to the ED for snorting bupropion.    I personally discussed history with EMS who reports patient snorted 10-20 bupropion immediately prior to arrival.  His mother called EMS because she was concerned.  They report normal vitals and normal blood glucose en route.    Patient reports that he snorted 15 of his bupropion in an attempt to get high.  Reports that he wanted to smoke crack cocaine but did not have access to any of it and had previously been snorting bupropion while in retirement.  He denies any other coingestions.  Denies any other symptoms currently. On chart review he is prescribed extended release bupropion 150 mg.     Physical Exam   Triage vitals:  T 36.8 °C (98.2 °F)  HR 79  /86  RR 19  O2 98 % None (Room air)    Constitutional: Awake, alert, not in acute distress  HENT: Head atraumatic, mucous membranes moist  Eyes:  Conjunctivae normal, pupils dilated bilaterally  Neck:  Supple  Lungs: Clear to auscultation, breath sounds equal and symmetric, no wheezes, rales, or rhonchi  Heart: Regular rate and rhythm, no murmur, rub or gallop  Abdomen: Soft, nontender, nondistended, no rebound or guarding  Extremities: No lower extremity edema  Neuro: Alert, no focal deficit  Skin: Warm, dry  Psych: Rapid, pressured speech but cooperative      Medical Decision Making & ED Course   Medical Decision Making:  Jesse Rico is a 39 y.o. male with PMHx as above in HPI who presented to the ED for snorting bupropion. Patient was afebrile, hemodynamically stable, and satting on room air on arrival.     Exam as above.    EKG showed NSR without signs of acute ischemia.  Specifically  normal QRS interval.    Labs below in ED course, notable for CBC: anemia similar to prior, BMP: no clinically significant electrolyte abnormalities, no NATALIA , negative Tylenol, salicylate, alcohol levels, urine tox positive for cannabinoids but otherwise negative, Depakote level subtherapeutic doubt coingestion.    Case discussed with poison control, they recommend patient will require 24-hour period of observation with particular concern for potential seizures.    He was admitted to medicine for observation.   ------------------------------------------------  Independent Test Interpretation: See ED Course  Discussion with Other Providers: See ED Course    ED Course:  ED Course as of 11/24/24 1941   Sun Nov 24, 2024   1719 Personally discussed currently available facts and concerns about the case with poison control, who advises rec obs for 12-24 hrs   [SS]   1735 CBC and Auto Differential(!)  Stable anemia compared to prior, no leukocytosis or thrombocytopenia [SS]   1801 External chart review:  [facility] ED visit Sept 2024  Seen for L shoulder pain     [SS]   1822 Basic metabolic panel(!)  No clinically significant electrolyte abnormalities, no NATALIA   [SS]   1822 Creatine Kinase(!): 706  Mild elevation [SS]   1822 Valproic Acid(!): <10  Subtherapeutic, doubt co ingestion [SS]   1842 ECG 12 lead  I have personally reviewed and interpreted this EKG.  Normal sinus rhythm, rate 79 BPM  Normal axis  MO interval and QRS duration within normal limits.  QTc within normal limits.  No signs of acute ischemia or infarction   [SS]   1843 Basic metabolic panel(!)  No clinically significant electrolyte abnormalities, no NATALIA   [SS]   1843 Acute Toxicology Panel, Blood  Neg doubt coingestion [SS]   1843 MAGNESIUM: 1.91  nromal [SS]   1852 Alcohol: <10  neg [SS]   1858 Urinalysis with Reflex Culture and Microscopic  No signs of UTI   [SS]   1903 Drug Screen, Urine(!)  Pos for cannabinoids otherwise neg [SS]   1923 Personally discussed  currently available facts and concerns about the case with medicine, who advises accepts to medicine   [SS]      ED Course User Index  [SS] Steffen Simerlink, MD         Diagnoses as of 11/24/24 1941   Bupropion overdose, accidental or unintentional, initial encounter   History of schizoaffective disorder   Polysubstance abuse (Multi)     Disposition   As a result of their workup, the patient will require admission to the hospital.  The patient was informed of his diagnosis.  The patient was given the opportunity to ask questions and I answered them. The patient agreed to be admitted to the hospital.    Procedures   Procedures    Steffen Simerlink, MD Steffen Simerlink, MD  11/24/24 1942

## 2024-11-25 VITALS
SYSTOLIC BLOOD PRESSURE: 141 MMHG | OXYGEN SATURATION: 97 % | TEMPERATURE: 96.7 F | HEIGHT: 68 IN | WEIGHT: 145 LBS | RESPIRATION RATE: 18 BRPM | DIASTOLIC BLOOD PRESSURE: 85 MMHG | BODY MASS INDEX: 21.98 KG/M2 | HEART RATE: 88 BPM

## 2024-11-25 LAB
ALBUMIN SERPL BCP-MCNC: 4 G/DL (ref 3.4–5)
ALP SERPL-CCNC: 61 U/L (ref 33–120)
ALT SERPL W P-5'-P-CCNC: 14 U/L (ref 10–52)
ANION GAP SERPL CALC-SCNC: 8 MMOL/L (ref 10–20)
AST SERPL W P-5'-P-CCNC: 14 U/L (ref 9–39)
ATRIAL RATE: 79 BPM
BILIRUB SERPL-MCNC: 0.7 MG/DL (ref 0–1.2)
BUN SERPL-MCNC: 6 MG/DL (ref 6–23)
CALCIUM SERPL-MCNC: 8.6 MG/DL (ref 8.6–10.3)
CHLORIDE SERPL-SCNC: 107 MMOL/L (ref 98–107)
CK SERPL-CCNC: 663 U/L (ref 0–325)
CO2 SERPL-SCNC: 27 MMOL/L (ref 21–32)
CREAT SERPL-MCNC: 0.96 MG/DL (ref 0.5–1.3)
EGFRCR SERPLBLD CKD-EPI 2021: >90 ML/MIN/1.73M*2
ERYTHROCYTE [DISTWIDTH] IN BLOOD BY AUTOMATED COUNT: 12.1 % (ref 11.5–14.5)
GLUCOSE SERPL-MCNC: 89 MG/DL (ref 74–99)
HCT VFR BLD AUTO: 40.6 % (ref 41–52)
HGB BLD-MCNC: 13.4 G/DL (ref 13.5–17.5)
HOLD SPECIMEN: NORMAL
MCH RBC QN AUTO: 28.5 PG (ref 26–34)
MCHC RBC AUTO-ENTMCNC: 33 G/DL (ref 32–36)
MCV RBC AUTO: 86 FL (ref 80–100)
NRBC BLD-RTO: 0 /100 WBCS (ref 0–0)
P AXIS: 58 DEGREES
P OFFSET: 194 MS
P ONSET: 140 MS
PLATELET # BLD AUTO: 191 X10*3/UL (ref 150–450)
POTASSIUM SERPL-SCNC: 4.1 MMOL/L (ref 3.5–5.3)
PR INTERVAL: 158 MS
PROT SERPL-MCNC: 6.1 G/DL (ref 6.4–8.2)
Q ONSET: 219 MS
QRS COUNT: 13 BEATS
QRS DURATION: 86 MS
QT INTERVAL: 378 MS
QTC CALCULATION(BAZETT): 433 MS
QTC FREDERICIA: 414 MS
R AXIS: 81 DEGREES
RBC # BLD AUTO: 4.7 X10*6/UL (ref 4.5–5.9)
SODIUM SERPL-SCNC: 138 MMOL/L (ref 136–145)
T AXIS: 54 DEGREES
T OFFSET: 408 MS
VENTRICULAR RATE: 79 BPM
WBC # BLD AUTO: 4.2 X10*3/UL (ref 4.4–11.3)

## 2024-11-25 PROCEDURE — 36415 COLL VENOUS BLD VENIPUNCTURE: CPT | Performed by: NURSE PRACTITIONER

## 2024-11-25 PROCEDURE — 99239 HOSP IP/OBS DSCHRG MGMT >30: CPT | Performed by: INTERNAL MEDICINE

## 2024-11-25 PROCEDURE — 2500000004 HC RX 250 GENERAL PHARMACY W/ HCPCS (ALT 636 FOR OP/ED): Performed by: NURSE PRACTITIONER

## 2024-11-25 PROCEDURE — 82550 ASSAY OF CK (CPK): CPT | Performed by: NURSE PRACTITIONER

## 2024-11-25 PROCEDURE — G0378 HOSPITAL OBSERVATION PER HR: HCPCS

## 2024-11-25 PROCEDURE — 99255 IP/OBS CONSLTJ NEW/EST HI 80: CPT | Performed by: NURSE PRACTITIONER

## 2024-11-25 PROCEDURE — 85027 COMPLETE CBC AUTOMATED: CPT | Performed by: NURSE PRACTITIONER

## 2024-11-25 PROCEDURE — 80053 COMPREHEN METABOLIC PANEL: CPT | Performed by: NURSE PRACTITIONER

## 2024-11-25 RX ADMIN — SODIUM CHLORIDE 125 ML/HR: 9 INJECTION, SOLUTION INTRAVENOUS at 07:32

## 2024-11-25 ASSESSMENT — COGNITIVE AND FUNCTIONAL STATUS - GENERAL
DAILY ACTIVITIY SCORE: 24
MOBILITY SCORE: 24

## 2024-11-25 ASSESSMENT — PAIN - FUNCTIONAL ASSESSMENT
PAIN_FUNCTIONAL_ASSESSMENT: 0-10
PAIN_FUNCTIONAL_ASSESSMENT: 0-10

## 2024-11-25 ASSESSMENT — PAIN SCALES - GENERAL
PAINLEVEL_OUTOF10: 0 - NO PAIN

## 2024-11-25 NOTE — PROGRESS NOTES
11/25/24 1251   Discharge Planning   Living Arrangements Parent   Support Systems Parent   Assistance Needed PTA; Independent w/ADL , up ad minal   Type of Residence Private residence   Home or Post Acute Services None   Expected Discharge Disposition   (TBD-psychiatry consulted)   Does the patient need discharge transport arranged? Yes   RoundTrip coordination needed? Yes   Has discharge transport been arranged? No   Intensity of Service   Intensity of Service >30 min     Care Coordinator Note:  [facility] rounding w/ nurse manager,bedside nurse, Attending to discuss discharge planning  Plan: psychiatry consulted; poly substance abuse  Status: Observation d/t Bupropion overdose   Payor: [facility] [employer]  Disposition: TBD    Yuliya BAEZ, RN [facility]

## 2024-11-25 NOTE — DISCHARGE SUMMARY
"Admitting Provider: Cruzito Gallo MD  Discharge Provider: Cruzito Gallo MD  Primary Care Physician at Discharge: Rosario Ross [phone]   Admission Date: 11/24/2024     Discharge Date: 11/25/2024  Current Planned Discharge Disposition: Home    Discharge Diagnoses  Principal Problem:    Bupropion overdose, accidental or unintentional, initial encounter      Hospital Course  39 yoM who snorted Wellbutrin tablets to get a high.    He was admitted due to concern for Wellbutrin overdose as well question of SI and/or HI. He was feeling well the next day. Psych did not feel he met inpatient psych. He will need to follow up with his mental health providers. Discharged home in stable condition.     Test Results Pending At Discharge  Pending Labs       No current pending labs.            Pertinent Physical Exam At Time of Discharge  /85 (BP Location: Left arm, Patient Position: Sitting)   Pulse 88   Temp 35.9 °C (96.7 °F) (Temporal)   Resp 18   Ht 1.727 m (5' 8\")   Wt 65.8 kg (145 lb)   SpO2 97%   BMI 22.05 kg/m²   Physical Exam  Cardiovascular:      Rate and Rhythm: Normal rate and regular rhythm.      Heart sounds: Normal heart sounds.   Pulmonary:      Breath sounds: Normal breath sounds.   Abdominal:      General: Bowel sounds are normal.      Palpations: Abdomen is soft.   Musculoskeletal:         General: Normal range of motion.   Neurological:      General: No focal deficit present.      Mental Status: He is alert and oriented to person, place, and time.   Psychiatric:         Mood and Affect: Mood normal.         Home Medications     Medication List      CONTINUE taking these medications     benztropine 1 mg tablet; Commonly known as: Cogentin   buPROPion  mg 24 hr tablet; Commonly known as: Wellbutrin XL   busPIRone 10 mg tablet; Commonly known as: Buspar   * divalproex 500 mg 24 hr tablet; Commonly known as: Depakote ER   * divalproex 500 mg 24 hr tablet; Commonly known as: " Depakote ER   gabapentin 100 mg capsule; Commonly known as: Neurontin   haloperidol 5 mg tablet; Commonly known as: Haldol   haloperidol decanoate 100 mg/mL injection; Commonly known as: Haldol   Decanoate   loxapine 50 mg capsule; Commonly known as: Loxitane   mirtazapine 30 mg tablet; Commonly known as: Remeron   OLANZapine 10 mg tablet; Commonly known as: ZyPREXA   propranolol 10 mg tablet; Commonly known as: Inderal   traZODone 150 mg tablet; Commonly known as: Desyrel  * This list has 2 medication(s) that are the same as other medications   prescribed for you. Read the directions carefully, and ask your doctor or   other care provider to review them with you.       Outpatient Follow-Up  Future Appointments   Date Time Provider Department Center   11/26/2024  5:45 PM Rosario Ross MD [id number]       Cruzito Gallo MD    I spent more than 30 min coordinating this patient's discharge.

## 2024-11-25 NOTE — CARE PLAN
-1430 Patient's mother informed about discharge and will  patient.  -1445 Patient has all belongings including clothes and shoes. Peripheral IV removed and Given AVS. Escorted down to lobby by  police.    The patient's goals for the shift include  discharge home    The clinical goals for the shift include remain safe, free of seizure      Problem: Potential for Harm to Self or Others  Goal: Cooperates with admission process  11/25/2024 1444 by Amanda Espinoza RN  Outcome: Adequate for Discharge  11/25/2024 1444 by Amanda Espinoza RN  Outcome: Progressing  Goal: Participates in unit activities  11/25/2024 1444 by Amanda Espinoza RN  Outcome: Adequate for Discharge  11/25/2024 1444 by Amanda Espinoza RN  Outcome: Progressing  Goal: Patient/Family participate in treatment and discharge plans  11/25/2024 1444 by Amanda Espinoza RN  Outcome: Adequate for Discharge  11/25/2024 1444 by Amanda Espinoza RN  Outcome: Progressing  Goal: Identifies deescalation techniques  11/25/2024 1444 by Amanda Espinoza RN  Outcome: Adequate for Discharge  11/25/2024 1444 by Amanda Espinoza RN  Outcome: Progressing  Goal: Understands least restrictive measures  11/25/2024 1444 by Amanda Espinoza RN  Outcome: Adequate for Discharge  11/25/2024 1444 by Amanda Espinoza RN  Outcome: Progressing  Goal: Identifies stressors that lead to harmful behaviors  11/25/2024 1444 by Amanda Espinoza RN  Outcome: Adequate for Discharge  11/25/2024 1444 by Amanda Espinoza RN  Outcome: Progressing  Goal: Notifies staff when experiencing harmful thoughts toward self/others  11/25/2024 1444 by Amanda Espinoza RN  Outcome: Adequate for Discharge  11/25/2024 1444 by Amanda Espinoza RN  Outcome: Progressing  Goal: Denies harm toward self or others  11/25/2024 1444 by Amanda Espinoza RN  Outcome: Adequate for Discharge  11/25/2024 1444 by Amanda Espinoza RN  Outcome: Progressing  Goal: Free from restraint events  11/25/2024 1444 by Amanda Espinoza RN  Outcome: Adequate for  Discharge  11/25/2024 1444 by Amanda Espinoza, RN  Outcome: Progressing

## 2024-11-25 NOTE — H&P
HPI: Jesse Rico is a 39 y.o. male, with a PMH of schizoaffective disorder, polysubstance abuse, who presented to [facility] ED on 11/24/2024 for drug overdose concerns. As I entered the room he asked if I was the psychiatrist and immediately  became guarded and anxious. Patient reports he snorted between 18-20 bupropion pills over the course of the last 2-3 days. He state he does this often if he can't get cocaine. He states he feels fine aside from minor abdominal pain earlier today. Reports his mom was worried about him and called the police to bring him in. In further conversation with patient he is regarding to himself as multiple identities, has racing thoughts and unable to stay on topic. He denies any suicidal ideations or attempts but endorses homicidal ideations and state he wants to kill his best friend and has killed others in the past, then starts talking about his younger self when he was 3 and 7yo and has memories of his father likely killing someone.     EMS who reported to ED that patient snorted 10-20 bupropion immediately prior to arrival.    On chart review he is prescribed extended release bupropion 150 mg.     ED Course: VS fairly stable. Labs notable for CPK of 706, h/h 12.7/38.2. UA negative. Tox panel + cannabinoid,   Valproic acid <10. EKG- NSR, no acute ischemic changes notable.     Case discussed with poison control, they recommend patient will require 24-hour period of observation with particular concern for potential seizures. Advise only giving Benzodiazepines in case of seizure event and holding all other medications at this time - unclear if patient is correctly taking his psych meds.     Patient History   PMH: He has no past medical history on file.  PSH: He has no past surgical history on file.  SH: He reports that he has been smoking cigarettes. He does not have any smokeless tobacco history on file. He reports current alcohol use of about 1.0 standard drink of alcohol per  "week.  FH: family history is not on file.     Allergies   Allergen Reactions    Lactase Nausea And Vomiting and Unknown     Current Outpatient Medications   Medication Instructions    benztropine (COGENTIN) 1 mg, oral, 2 times daily    buPROPion XL (WELLBUTRIN XL) 150 mg, oral, Every morning    busPIRone (BUSPAR) 10 mg, oral, 2 times daily    divalproex (Depakote ER) 500 mg 24 hr tablet 3 tablets, oral, Nightly, At 2pm and 9pm     divalproex (DEPAKOTE ER) 500 mg, oral, 2 times daily, Do not crush, chew, or split. In the morning     gabapentin (NEURONTIN) 100 mg, oral, 3 times daily    haloperidol (HALDOL) 5 mg, oral, 2 times daily    haloperidol decanoate (HALDOL DECANOATE) 200 mg, intramuscular, Every 28 days    loxapine (LOXITANE) 50 mg, oral, 2 times daily    mirtazapine (REMERON) 30 mg, oral, Nightly    OLANZapine (ZYPREXA) 10 mg, oral, Nightly    propranolol (INDERAL) 10 mg, oral, 2 times daily    traZODone (DESYREL) 150 mg, oral, Nightly     Review of Systems   ROS: 10-point review of systems was performed and is otherwise negative except as noted in HPI.        Heart Rate:  [79]   Temperature:  [36.8 °C (98.2 °F)]   Respirations:  [19]   BP: (138)/(86)   Height:  [172.7 cm (5' 8\")]   Weight:  [65.8 kg (145 lb)]   Pulse Ox:  [98 %]      0-10 (Numeric) Pain Score: 0 - No pain   Vitals:    11/24/24 1720   Weight: 65.8 kg (145 lb)        Physical Exam:  Vitals and nursing notes reviewed.  GENERAL: Alert and awake, cooperative; in no acute distress  SKIN: Warm and dry, cap refill <2  HEENT: Normocephalic, PEERL, mucous membranes pink and moist  CARDIAC: Regular rate and rhythm, S1S2, no murmurs or abnormal heart sounds  CHEST: Normal respiratory effort, no abnormal breath sounds  ABDOMEN: soft, non-distended, non-tender with palpation  EXTREMITIES: No lower extremity edema, normal pulses all 4 extremities  NEURO: Alert and oriented, mental status at baseline, no focal deficits  PSYCH: Behavior and affect as " expected     Medications      Diagnostic Results   CBC- 2024:  5:17 PM  6.2 12.7 199    38.2      BMP- 2024:  5:17 PM  135 6 _ 96   3.8 0.99 23    Estimated Creatinine Clearance: 94.2 mL/min (by C-G formula based on SCr of 0.99 mg/dL).     CA: 9.1 PROTIEN: 5.8 ALT: 10 Total Bili: 0.3 M.91   PHOS: _ ALBUMIN: 4.0 AST: 11   Alk Phos: 59      COAGS- No results in last year.  _   _ _     CV Labs  Hemoglobin A1C   Date/Time Value Ref Range Status   2022 06:32 AM 5.4 4.3 - 5.6 % Final     Comment:     American Diabetes Association guidelines indicate that patients with HgbA1c in the range 5.7-6.4% are at increased risk for development of diabetes, and intervention by lifestyle modification may be beneficial. HgbA1c greater or equal to 6.5% is considered diagnostic of diabetes.       Pertinent Imaging  No results found.         Assessment/Plan     Jesse Rico is a 39 y.o. male, with a PMH of schizoaffective disorder, polysubstance abuse who presented to [facility] ED on 2024 for drug overdose concerns. As I entered the room he asked if I was the psychiatrist and immediately  became guarded and anxious. Patient reports he snorted between 18-20 bupropion pills over the course of the last 2-3 days. He state he does this often if he can't get cocaine. He states he feels fine aside from minor abdominal pain earlier today. Reports his mom was worried about him and called the police to bring him in. In further conversation with patient he is regarding to himself as multiple identities, has racing thoughts and unable to stay on topic. He denies any suicidal ideations or attempts but endorses homicidal ideations and state he wants to kill his best friend and has killed others in the past, then starts talking about his younger self when he was 3 and 7yo and has memories of his father likely killing someone.     EMS who reported to ED that patient snorted 10-20 bupropion immediately prior to arrival.    On  chart review he is prescribed extended release bupropion 150 mg.     ED Course: VS fairly stable. Labs notable for CPK of 706, h/h 12.7/38.2. UA negative. Tox panel + cannabinoid,   Valproic acid <10. EKG- NSR, no acute ischemic changes notable.     Case discussed with poison control, they recommend patient will require 24-hour period of observation with particular concern for potential seizures. Advise only giving Benzodiazepines in case of seizure event and holding all other medications at this time - unclear if patient is correctly taking his psych meds.     Drug overdose  Rhabdomyolysis  Urine tox panel + cannabinoid   Polysubstance abuse  Schizoaffective disorder, decompensated at present   -monitor on tele  -seizure precautions, ativan PRN   -IVF, follow CPK levels   -Consult psych, suspect he will require inpatient psych admission   -patient cannot sign out AMA, does not have capacity at this time    Chart, medical history, and labs/testing reviewed in detail.   Disposition: TBD      Silvia Nicole, APRN-CNP   Observation/Internal Med [facility]  [facility]  11/24/24  8:07 PM  Total time of 60 minutes spent on professional and overall care, with >50% of time dedicated to counseling/coordination of care.

## 2024-11-25 NOTE — PROGRESS NOTES
Pharmacy Medication History     Additional concerns with the patient's PTA list.   PATIENT HAS BEEN AT SEVERAL FACILITIES AND HIS MEDICATIONS HAVE  NEVER BEEN REGULATED TO HELP HIM, PER MOTHER. MOTHER ALSO STATES HALOPERIDOL DEC 100MG/ML WAS GIVEN 2 WEEKS AGO. PATIENT WAS RELEASED FROM [facility] ON FRIDAY.    The following updates were made to the Prior to Admission medication list:     Source of Information: MOTHER    Medications ADDED:   N/A  Medications CHANGED:  N/A  Medications REMOVED:   N/A  Medications NOT TAKING:   N/A    Allergy reviewed : Yes    Meds 2 Beds : No    Comments:      The list below reflectives the updated PTA list. Please review each medication in order reconciliation for additional clarification and justification.    Prior to Admission Medications   Prescriptions Last Dose Informant     OLANZapine (ZyPREXA) 10 mg tablet       Sig: Take 1 tablet (10 mg) by mouth once daily at bedtime.   benztropine (Cogentin) 1 mg tablet Past Month Other     Sig: Take 1 tablet (1 mg) by mouth 2 times a day.   buPROPion XL (Wellbutrin XL) 150 mg 24 hr tablet Past Month Other     Sig: Take 1 tablet (150 mg) by mouth once daily in the morning.   busPIRone (Buspar) 10 mg tablet  Other     Sig: Take 1 tablet (10 mg) by mouth 2 times a day.   divalproex (Depakote ER) 500 mg 24 hr tablet Past Month      Sig: Take 1 tablet (500 mg) by mouth 2 times a day. Do not crush, chew, or split. In the morning   divalproex (Depakote ER) 500 mg 24 hr tablet Past Month      Sig: Take 3 tablets (1,500 mg) by mouth once daily at bedtime. At 2pm and 9pm   gabapentin (Neurontin) 100 mg capsule  Other     Sig: Take 1 capsule (100 mg) by mouth 3 times a day.   haloperidol (Haldol) 5 mg tablet Past Month      Sig: Take 1 tablet (5 mg) by mouth 2 times a day.   haloperidol decanoate (Haldol Decanoate) 100 mg/mL injection  Other     Sig: Inject 2 mL (200 mg) into the muscle every 28 (twenty-eight) days. LAST DOSE 2 WEEKS AGO   loxapine  (Loxitane) 50 mg capsule  Other     Sig: Take 1 capsule (50 mg) by mouth 2 times a day.   mirtazapine (Remeron) 30 mg tablet  Other     Sig: Take 1 tablet (30 mg) by mouth once daily at bedtime.   propranolol (Inderal) 10 mg tablet  Other     Sig: Take 1 tablet (10 mg) by mouth 2 times a day.   traZODone (Desyrel) 150 mg tablet Past Month      Sig: Take 1 tablet (150 mg) by mouth once daily at bedtime.      Facility-Administered Medications: None       The list below reflectives the updated allergy list. Please review each documented allergy for additional clarification and justification.    Allergies   Allergen Reactions    Lactase Nausea And Vomiting and Unknown          11/25/24 at 11:24 AM - Desiree Pacheco

## 2024-11-25 NOTE — CONSULTS
Psych Consult      Consult Requested By: Cruzito Gallo    Reason for Consultation:  schizoaffective disorder, polysubstance abuse    HISTORY OF PRESENT ILLNESS    Per ED -  Jesse Rico is a 39 y.o. male, with a PMH of schizoaffective disorder, polysubstance abuse, who presented to [facility] ED on 11/24/2024 for drug overdose concerns. As I entered the room he asked if I was the psychiatrist and immediately  became guarded and anxious. Patient reports he snorted between 18-20 bupropion pills over the course of the last 2-3 days. He state he does this often if he can't get cocaine. He states he feels fine aside from minor abdominal pain earlier today. Reports his mom was worried about him and called the police to bring him in. In further conversation with patient he is regarding to himself as multiple identities, has racing thoughts and unable to stay on topic. He denies any suicidal ideations or attempts but endorses homicidal ideations and state he wants to kill his best friend and has killed others in the past, then starts talking about his younger self when he was 3 and 5yo and has memories of his father likely killing someone.      EMS who reported to ED that patient snorted 10-20 bupropion immediately prior to arrival.    On chart review he is prescribed extended release bupropion 150 mg.      ED Course: VS fairly stable. Labs notable for CPK of 706, h/h 12.7/38.2. UA negative. Tox panel + cannabinoid,   Valproic acid <10. EKG- NSR, no acute ischemic changes notable.      Case discussed with poison control, they recommend patient will require 24-hour period of observation with particular concern for potential seizures. Advise only giving Benzodiazepines in case of seizure event and holding all other medications at this time - unclear if patient is correctly taking his psych meds.        HPI:  Pt is a 39 yr old AAM being seen for polysubstance use disorder and schizoaffective disorder.   P[place] [facility] [place]  "of drug overdose  On eval pt is calm and cooperative with assessment.  Speech is a bit pressured but pt is directable.  States that he lives in [place] with his mother. Currently on SSI.  Hx of opiate, ecstasy and crack abuse.    Pt admits that he has been snorting wellbutrin \"to help me stop using crack\"  Recently admitted to [facility] 3x in past 3 months for concern of psychiatric decompensation.   Pt states that he is feeling \"fine\"   Denies SI/HI, no intent to harm himself or anyone else at this time.  Denies AVH, does not appear internally stimulated at this time.  Denies any psychiatric complaints outside of sleep disturbance which has been an ongoing issue \"since I was a kid\"    Spoke with mother who is concerned that pt has not been taking the right medication. Provider educated mother and pt about concern for drug abuse and or Rx medication abuse, how difficult to discern drug induced vs organic decompensation.     Pt plans to attend an intake appointment tomorrow at [facility]. Pt states that he has been to the facility several times and would like to \"get sober\". Admits he still has strong desire to use crack.    Pt is currently linked with [facility] however mother (guardian) is going to take pt to  for his follow up care moving forward  Current medication:  Trazadone 150 mg HS  Haldol dec 200 mg Q28 days  Haldol 5 mg TID          PSYCHIATRIC REVIEW OF SYSTEMS  SI: Denies    Depression: Denies    [place]: Denies    Panic: Denies    Generalized Anxiety: Denies    PTSD: Denies    OCD: Denies    Psychosis: Denies    Eating Disorder: Denies    Current Outpatient Medications   Medication Instructions    benztropine (COGENTIN) 1 mg, 2 times daily    buPROPion XL (WELLBUTRIN XL) 150 mg, Every morning    busPIRone (BUSPAR) 10 mg, oral, 2 times daily    divalproex (Depakote ER) 500 mg 24 hr tablet 3 tablets, Nightly    divalproex (DEPAKOTE ER) 500 mg, 2 times daily    gabapentin (NEURONTIN) 100 mg, oral, 3 times daily    " "haloperidol (HALDOL) 5 mg, 2 times daily    haloperidol decanoate (HALDOL DECANOATE) 200 mg, intramuscular, Every 28 days, LAST DOSE 2 WEEKS AGO    loxapine (LOXITANE) 50 mg, oral, 2 times daily    mirtazapine (REMERON) 30 mg, oral, Nightly    OLANZapine (ZYPREXA) 10 mg, oral, Nightly    propranolol (INDERAL) 10 mg, oral, 2 times daily    traZODone (DESYREL) 150 mg, Nightly        OARRS:   510    No past medical history on file.     No past surgical history on file.     No family history on file.     Social History     Substance and Sexual Activity   Alcohol Use Yes    Alcohol/week: 1.0 standard drink of alcohol    Types: 1 Shots of liquor per week        Social History     Substance and Sexual Activity   Drug Use Not on file        Social History     Tobacco Use   Smoking Status Every Day    Current packs/day: 1.00    Types: Cigarettes   Smokeless Tobacco Not on file        MENTAL STATUS EXAM  Physical Exam  Psychiatric:         Attention and Perception: Attention and perception normal.         Mood and Affect: Mood and affect normal.         Speech: Speech is rapid and pressured.         Behavior: Behavior is cooperative.         Thought Content: Thought content normal.         Cognition and Memory: Cognition and memory normal.         Judgment: Judgment is impulsive.         Vitals:    11/24/24 1720 11/25/24 0300 11/25/24 0732   BP: 138/86 131/88 117/72   BP Location: Right arm Right arm Left arm   Patient Position: Lying Lying Lying   Pulse: 79 77 72   Resp: 19 19 18   Temp: 36.8 °C (98.2 °F) 37.2 °C (99 °F) 35.9 °C (96.7 °F)   TempSrc: Oral Temporal Temporal   SpO2: 98% 100% 98%   Weight: 65.8 kg (145 lb)     Height: 1.727 m (5' 8\")          Recent Results (from the past 72 hours)   CBC and Auto Differential    Collection Time: 11/24/24  5:17 PM   Result Value Ref Range    WBC 6.2 4.4 - 11.3 x10*3/uL    nRBC 0.0 0.0 - 0.0 /100 WBCs    RBC 4.46 (L) 4.50 - 5.90 x10*6/uL    Hemoglobin 12.7 (L) 13.5 - 17.5 g/dL    " Hematocrit 38.2 (L) 41.0 - 52.0 %    MCV 86 80 - 100 fL    MCH 28.5 26.0 - 34.0 pg    MCHC 33.2 32.0 - 36.0 g/dL    RDW 12.2 11.5 - 14.5 %    Platelets 199 150 - 450 x10*3/uL    Neutrophils % 56.1 40.0 - 80.0 %    Immature Granulocytes %, Automated 0.2 0.0 - 0.9 %    Lymphocytes % 28.5 13.0 - 44.0 %    Monocytes % 10.5 2.0 - 10.0 %    Eosinophils % 4.4 0.0 - 6.0 %    Basophils % 0.3 0.0 - 2.0 %    Neutrophils Absolute 3.47 1.20 - 7.70 x10*3/uL    Immature Granulocytes Absolute, Automated 0.01 0.00 - 0.70 x10*3/uL    Lymphocytes Absolute 1.76 1.20 - 4.80 x10*3/uL    Monocytes Absolute 0.65 0.10 - 1.00 x10*3/uL    Eosinophils Absolute 0.27 0.00 - 0.70 x10*3/uL    Basophils Absolute 0.02 0.00 - 0.10 x10*3/uL   Basic metabolic panel    Collection Time: 11/24/24  5:17 PM   Result Value Ref Range    Glucose 96 74 - 99 mg/dL    Sodium 135 (L) 136 - 145 mmol/L    Potassium 3.8 3.5 - 5.3 mmol/L    Chloride 103 98 - 107 mmol/L    Bicarbonate 23 21 - 32 mmol/L    Anion Gap 13 10 - 20 mmol/L    Urea Nitrogen 6 6 - 23 mg/dL    Creatinine 0.99 0.50 - 1.30 mg/dL    eGFR >90 >60 mL/min/1.73m*2    Calcium 9.1 8.6 - 10.3 mg/dL   Magnesium    Collection Time: 11/24/24  5:17 PM   Result Value Ref Range    Magnesium 1.91 1.60 - 2.40 mg/dL   Creatine Kinase    Collection Time: 11/24/24  5:17 PM   Result Value Ref Range    Creatine Kinase 706 (H) 0 - 325 U/L   Ethanol    Collection Time: 11/24/24  5:17 PM   Result Value Ref Range    Alcohol <10 <=10 mg/dL   Valproic Acid    Collection Time: 11/24/24  5:17 PM   Result Value Ref Range    Valproic Acid <10 (L) 50 - 100 ug/mL   Acute Toxicology Panel, Blood    Collection Time: 11/24/24  5:17 PM   Result Value Ref Range    Acetaminophen <10.0 10.0 - 30.0 ug/mL    Salicylate  <3 4 - 20 mg/dL    Alcohol <10 <=10 mg/dL   Drug Screen, Urine    Collection Time: 11/24/24  6:36 PM   Result Value Ref Range    Amphetamine Screen, Urine Presumptive Negative Presumptive Negative    Barbiturate Screen,  Urine Presumptive Negative Presumptive Negative    Benzodiazepines Screen, Urine Presumptive Negative Presumptive Negative    Cannabinoid Screen, Urine Presumptive Positive (A) Presumptive Negative    Cocaine Metabolite Screen, Urine Presumptive Negative Presumptive Negative    Fentanyl Screen, Urine Presumptive Negative Presumptive Negative    Opiate Screen, Urine Presumptive Negative Presumptive Negative    Oxycodone Screen, Urine Presumptive Negative Presumptive Negative    PCP Screen, Urine Presumptive Negative Presumptive Negative    Methadone Screen, Urine Presumptive Negative Presumptive Negative   Urinalysis with Reflex Culture and Microscopic    Collection Time: 11/24/24  6:43 PM   Result Value Ref Range    Color, Urine Light-Yellow Light-Yellow, Yellow, Dark-Yellow    Appearance, Urine Clear Clear    Specific Gravity, Urine 1.005 1.005 - 1.035    pH, Urine 6.5 5.0, 5.5, 6.0, 6.5, 7.0, 7.5, 8.0    Protein, Urine NEGATIVE NEGATIVE, 10 (TRACE), 20 (TRACE) mg/dL    Glucose, Urine Normal Normal mg/dL    Blood, Urine NEGATIVE NEGATIVE    Ketones, Urine NEGATIVE NEGATIVE mg/dL    Bilirubin, Urine NEGATIVE NEGATIVE    Urobilinogen, Urine Normal Normal mg/dL    Nitrite, Urine NEGATIVE NEGATIVE    Leukocyte Esterase, Urine NEGATIVE NEGATIVE   Extra Urine Gray Tube    Collection Time: 11/24/24  6:43 PM   Result Value Ref Range    Extra Tube Hold for add-ons.    ECG 12 lead    Collection Time: 11/24/24  6:43 PM   Result Value Ref Range    Ventricular Rate 79 BPM    Atrial Rate 79 BPM    HI Interval 158 ms    QRS Duration 86 ms    QT Interval 378 ms    QTC Calculation(Bazett) 433 ms    P Axis 58 degrees    R Axis 81 degrees    T Axis 54 degrees    QRS Count 13 beats    Q Onset 219 ms    P Onset 140 ms    P Offset 194 ms    T Offset 408 ms    QTC Fredericia 414 ms   CBC    Collection Time: 11/25/24  5:35 AM   Result Value Ref Range    WBC 4.2 (L) 4.4 - 11.3 x10*3/uL    nRBC 0.0 0.0 - 0.0 /100 WBCs    RBC 4.70 4.50 -  5.90 x10*6/uL    Hemoglobin 13.4 (L) 13.5 - 17.5 g/dL    Hematocrit 40.6 (L) 41.0 - 52.0 %    MCV 86 80 - 100 fL    MCH 28.5 26.0 - 34.0 pg    MCHC 33.0 32.0 - 36.0 g/dL    RDW 12.1 11.5 - 14.5 %    Platelets 191 150 - 450 x10*3/uL   Comprehensive metabolic panel    Collection Time: 11/25/24  5:35 AM   Result Value Ref Range    Glucose 89 74 - 99 mg/dL    Sodium 138 136 - 145 mmol/L    Potassium 4.1 3.5 - 5.3 mmol/L    Chloride 107 98 - 107 mmol/L    Bicarbonate 27 21 - 32 mmol/L    Anion Gap 8 (L) 10 - 20 mmol/L    Urea Nitrogen 6 6 - 23 mg/dL    Creatinine 0.96 0.50 - 1.30 mg/dL    eGFR >90 >60 mL/min/1.73m*2    Calcium 8.6 8.6 - 10.3 mg/dL    Albumin 4.0 3.4 - 5.0 g/dL    Alkaline Phosphatase 61 33 - 120 U/L    Total Protein 6.1 (L) 6.4 - 8.2 g/dL    AST 14 9 - 39 U/L    Bilirubin, Total 0.7 0.0 - 1.2 mg/dL    ALT 14 10 - 52 U/L   Creatine Kinase    Collection Time: 11/25/24  5:35 AM   Result Value Ref Range    Creatine Kinase 663 (H) 0 - 325 U/L        ECG 12 lead    Result Date: 11/25/2024  Normal sinus rhythm with sinus arrhythmia Normal ECG When compared with ECG of 11-NOV-2024 07:24, No significant change was found See ED provider note for full interpretation and clinical correlation Confirmed by Lisa Gill ([id number]) on 11/25/2024 11:00:03 AM      ASSESSMENT AND PLAN  DX:   schizoaffective disorder  Opiate abuse  Stimulant abuse    PLAN:   -Pt does not require inpatient psychiatric care at this time  -Encourage pt to follow up at [facility]  -Encourage appropriate medication compliance  -Follow up at     Thank you for allowing us to participate in the care of this patient. We will sign off at this time.  .    I spent 60 minutes in the professional and overall care of this patient.      Rogerio Paulino, APRN-CNP

## 2024-11-25 NOTE — CARE PLAN
The patient's goals for the shift include      The clinical goals for the shift include remain free for seizures      Problem: Potential for Harm to Self or Others  Goal: Cooperates with admission process  Outcome: Progressing  Goal: Participates in unit activities  Outcome: Progressing  Goal: Patient/Family participate in treatment and discharge plans  Outcome: Progressing  Goal: Identifies deescalation techniques  Outcome: Progressing  Goal: Understands least restrictive measures  Outcome: Progressing  Goal: Identifies stressors that lead to harmful behaviors  Outcome: Progressing  Goal: Notifies staff when experiencing harmful thoughts toward self/others  Outcome: Progressing  Goal: Denies harm toward self or others  Outcome: Progressing  Goal: Free from restraint events  Outcome: Progressing

## 2024-11-26 ENCOUNTER — APPOINTMENT (OUTPATIENT)
Dept: PRIMARY CARE | Facility: CLINIC | Age: 39
End: 2024-11-26
Payer: MEDICAID

## 2024-11-26 ENCOUNTER — DOCUMENTATION (OUTPATIENT)
Dept: PRIMARY CARE | Facility: CLINIC | Age: 39
End: 2024-11-26

## 2024-11-26 NOTE — PROGRESS NOTES
Discharge Facility:  [facility]  Discharge Diagnosis: Bupropion overdose, accidental or unintentional, initial encounter  Admission Date:  11/24/24  Discharge Date:   11/25/24    PCP Appointment Date: 11/26/24  Specialist Appointment Date: D  Hospital Encounter and Summary Linked: Yes    This patient has a follow up scheduled with PCP within 2 business days of DC on 11/26/24.  This visit qualifies for TCM outreach.

## 2024-12-29 ENCOUNTER — HOSPITAL ENCOUNTER (INPATIENT)
Facility: HOSPITAL | Age: 39
End: 2024-12-29
Attending: INTERNAL MEDICINE | Admitting: INTERNAL MEDICINE
Payer: MEDICAID

## 2024-12-29 ENCOUNTER — APPOINTMENT (OUTPATIENT)
Dept: CARDIOLOGY | Facility: HOSPITAL | Age: 39
End: 2024-12-29
Payer: MEDICAID

## 2024-12-29 ENCOUNTER — APPOINTMENT (OUTPATIENT)
Dept: RADIOLOGY | Facility: HOSPITAL | Age: 39
End: 2024-12-29
Payer: MEDICAID

## 2024-12-29 VITALS
BODY MASS INDEX: 20.76 KG/M2 | HEIGHT: 70 IN | SYSTOLIC BLOOD PRESSURE: 129 MMHG | DIASTOLIC BLOOD PRESSURE: 78 MMHG | OXYGEN SATURATION: 96 % | TEMPERATURE: 101.3 F | WEIGHT: 145 LBS | HEART RATE: 107 BPM | RESPIRATION RATE: 18 BRPM

## 2024-12-29 DIAGNOSIS — R09.02 HYPOXIA: ICD-10-CM

## 2024-12-29 DIAGNOSIS — Z00.8 ENCOUNTER FOR PSYCHOLOGICAL EVALUATION: ICD-10-CM

## 2024-12-29 DIAGNOSIS — J18.9 ACUTE PNEUMONIA: Primary | ICD-10-CM

## 2024-12-29 LAB
ALBUMIN SERPL BCP-MCNC: 4.2 G/DL (ref 3.4–5)
ALP SERPL-CCNC: 49 U/L (ref 33–120)
ALT SERPL W P-5'-P-CCNC: 35 U/L (ref 10–52)
AMPHETAMINES UR QL SCN: NORMAL
ANION GAP SERPL CALC-SCNC: 13 MMOL/L (ref 10–20)
APAP SERPL-MCNC: <10 UG/ML
AST SERPL W P-5'-P-CCNC: 39 U/L (ref 9–39)
BARBITURATES UR QL SCN: NORMAL
BASOPHILS # BLD AUTO: 0.03 X10*3/UL (ref 0–0.1)
BASOPHILS NFR BLD AUTO: 0.2 %
BENZODIAZ UR QL SCN: NORMAL
BILIRUB SERPL-MCNC: 0.8 MG/DL (ref 0–1.2)
BUN SERPL-MCNC: 11 MG/DL (ref 6–23)
BZE UR QL SCN: NORMAL
CALCIUM SERPL-MCNC: 8.8 MG/DL (ref 8.6–10.3)
CANNABINOIDS UR QL SCN: NORMAL
CHLORIDE SERPL-SCNC: 98 MMOL/L (ref 98–107)
CO2 SERPL-SCNC: 29 MMOL/L (ref 21–32)
CREAT SERPL-MCNC: 1.26 MG/DL (ref 0.5–1.3)
EGFRCR SERPLBLD CKD-EPI 2021: 75 ML/MIN/1.73M*2
EOSINOPHIL # BLD AUTO: 0.01 X10*3/UL (ref 0–0.7)
EOSINOPHIL NFR BLD AUTO: 0.1 %
ERYTHROCYTE [DISTWIDTH] IN BLOOD BY AUTOMATED COUNT: 12.2 % (ref 11.5–14.5)
ETHANOL SERPL-MCNC: 11 MG/DL
FENTANYL+NORFENTANYL UR QL SCN: NORMAL
FLUAV RNA RESP QL NAA+PROBE: NOT DETECTED
FLUBV RNA RESP QL NAA+PROBE: NOT DETECTED
GLUCOSE SERPL-MCNC: 103 MG/DL (ref 74–99)
HCT VFR BLD AUTO: 41 % (ref 41–52)
HGB BLD-MCNC: 13.7 G/DL (ref 13.5–17.5)
IMM GRANULOCYTES # BLD AUTO: 0.06 X10*3/UL (ref 0–0.7)
IMM GRANULOCYTES NFR BLD AUTO: 0.4 % (ref 0–0.9)
LACTATE SERPL-SCNC: 1 MMOL/L (ref 0.4–2)
LYMPHOCYTES # BLD AUTO: 1.22 X10*3/UL (ref 1.2–4.8)
LYMPHOCYTES NFR BLD AUTO: 8.2 %
MCH RBC QN AUTO: 28.8 PG (ref 26–34)
MCHC RBC AUTO-ENTMCNC: 33.4 G/DL (ref 32–36)
MCV RBC AUTO: 86 FL (ref 80–100)
METHADONE UR QL SCN: NORMAL
MONOCYTES # BLD AUTO: 1.48 X10*3/UL (ref 0.1–1)
MONOCYTES NFR BLD AUTO: 10 %
NEUTROPHILS # BLD AUTO: 12.02 X10*3/UL (ref 1.2–7.7)
NEUTROPHILS NFR BLD AUTO: 81.1 %
NRBC BLD-RTO: 0 /100 WBCS (ref 0–0)
OPIATES UR QL SCN: NORMAL
OXYCODONE+OXYMORPHONE UR QL SCN: NORMAL
PCP UR QL SCN: NORMAL
PLATELET # BLD AUTO: 196 X10*3/UL (ref 150–450)
POTASSIUM SERPL-SCNC: 4.7 MMOL/L (ref 3.5–5.3)
PROT SERPL-MCNC: 6.7 G/DL (ref 6.4–8.2)
RBC # BLD AUTO: 4.75 X10*6/UL (ref 4.5–5.9)
SALICYLATES SERPL-MCNC: <3 MG/DL
SARS-COV-2 RNA RESP QL NAA+PROBE: NOT DETECTED
SODIUM SERPL-SCNC: 135 MMOL/L (ref 136–145)
WBC # BLD AUTO: 14.8 X10*3/UL (ref 4.4–11.3)

## 2024-12-29 PROCEDURE — 80143 DRUG ASSAY ACETAMINOPHEN: CPT | Performed by: INTERNAL MEDICINE

## 2024-12-29 PROCEDURE — 71046 X-RAY EXAM CHEST 2 VIEWS: CPT

## 2024-12-29 PROCEDURE — 80053 COMPREHEN METABOLIC PANEL: CPT | Performed by: INTERNAL MEDICINE

## 2024-12-29 PROCEDURE — 93005 ELECTROCARDIOGRAM TRACING: CPT

## 2024-12-29 PROCEDURE — 99223 1ST HOSP IP/OBS HIGH 75: CPT

## 2024-12-29 PROCEDURE — 36415 COLL VENOUS BLD VENIPUNCTURE: CPT | Performed by: INTERNAL MEDICINE

## 2024-12-29 PROCEDURE — 2500000001 HC RX 250 WO HCPCS SELF ADMINISTERED DRUGS (ALT 637 FOR MEDICARE OP): Performed by: INTERNAL MEDICINE

## 2024-12-29 PROCEDURE — 2500000004 HC RX 250 GENERAL PHARMACY W/ HCPCS (ALT 636 FOR OP/ED): Performed by: INTERNAL MEDICINE

## 2024-12-29 PROCEDURE — 96374 THER/PROPH/DIAG INJ IV PUSH: CPT

## 2024-12-29 PROCEDURE — 87636 SARSCOV2 & INF A&B AMP PRB: CPT | Performed by: INTERNAL MEDICINE

## 2024-12-29 PROCEDURE — 71046 X-RAY EXAM CHEST 2 VIEWS: CPT | Performed by: RADIOLOGY

## 2024-12-29 PROCEDURE — 2500000002 HC RX 250 W HCPCS SELF ADMINISTERED DRUGS (ALT 637 FOR MEDICARE OP, ALT 636 FOR OP/ED)

## 2024-12-29 PROCEDURE — 87040 BLOOD CULTURE FOR BACTERIA: CPT | Mod: PARLAB | Performed by: INTERNAL MEDICINE

## 2024-12-29 PROCEDURE — 80307 DRUG TEST PRSMV CHEM ANLYZR: CPT | Performed by: INTERNAL MEDICINE

## 2024-12-29 PROCEDURE — 2500000001 HC RX 250 WO HCPCS SELF ADMINISTERED DRUGS (ALT 637 FOR MEDICARE OP)

## 2024-12-29 PROCEDURE — 85025 COMPLETE CBC W/AUTO DIFF WBC: CPT | Performed by: INTERNAL MEDICINE

## 2024-12-29 PROCEDURE — 99285 EMERGENCY DEPT VISIT HI MDM: CPT | Mod: 25 | Performed by: INTERNAL MEDICINE

## 2024-12-29 PROCEDURE — 2500000004 HC RX 250 GENERAL PHARMACY W/ HCPCS (ALT 636 FOR OP/ED)

## 2024-12-29 PROCEDURE — 1210000001 HC SEMI-PRIVATE ROOM DAILY

## 2024-12-29 PROCEDURE — 83605 ASSAY OF LACTIC ACID: CPT | Performed by: INTERNAL MEDICINE

## 2024-12-29 RX ORDER — OLANZAPINE 5 MG/1
10 TABLET ORAL NIGHTLY
Status: DISCONTINUED | OUTPATIENT
Start: 2024-12-29 | End: 2025-01-02 | Stop reason: HOSPADM

## 2024-12-29 RX ORDER — BUPRENORPHINE HYDROCHLORIDE AND NALOXONE HYDROCHLORIDE DIHYDRATE 2; .5 MG/1; MG/1
1 TABLET SUBLINGUAL 2 TIMES DAILY
COMMUNITY

## 2024-12-29 RX ORDER — IBUPROFEN 600 MG/1
600 TABLET ORAL 2 TIMES DAILY PRN
COMMUNITY

## 2024-12-29 RX ORDER — ALBUTEROL SULFATE 0.83 MG/ML
3 SOLUTION RESPIRATORY (INHALATION) EVERY 2 HOUR PRN
Status: DISCONTINUED | OUTPATIENT
Start: 2024-12-29 | End: 2025-01-02 | Stop reason: HOSPADM

## 2024-12-29 RX ORDER — BUPRENORPHINE HYDROCHLORIDE AND NALOXONE HYDROCHLORIDE DIHYDRATE 2; .5 MG/1; MG/1
2 TABLET SUBLINGUAL 2 TIMES DAILY
Status: DISCONTINUED | OUTPATIENT
Start: 2024-12-29 | End: 2024-12-29

## 2024-12-29 RX ORDER — ACETAMINOPHEN 325 MG/1
650 TABLET ORAL EVERY 8 HOURS PRN
Status: DISCONTINUED | OUTPATIENT
Start: 2024-12-29 | End: 2025-01-02 | Stop reason: HOSPADM

## 2024-12-29 RX ORDER — ACETAMINOPHEN 500 MG
5 TABLET ORAL NIGHTLY PRN
Status: DISCONTINUED | OUTPATIENT
Start: 2024-12-29 | End: 2025-01-02 | Stop reason: HOSPADM

## 2024-12-29 RX ORDER — IBUPROFEN 600 MG/1
600 TABLET ORAL ONCE
Status: COMPLETED | OUTPATIENT
Start: 2024-12-29 | End: 2024-12-29

## 2024-12-29 RX ORDER — CALCIUM CARBONATE 750 MG/1
2-4 TABLET ORAL AS NEEDED
COMMUNITY

## 2024-12-29 RX ORDER — CEFTRIAXONE 2 G/50ML
2 INJECTION, SOLUTION INTRAVENOUS EVERY 24 HOURS
Status: DISCONTINUED | OUTPATIENT
Start: 2024-12-30 | End: 2025-01-01

## 2024-12-29 RX ORDER — TRAZODONE HYDROCHLORIDE 50 MG/1
150 TABLET ORAL NIGHTLY
Status: DISCONTINUED | OUTPATIENT
Start: 2024-12-29 | End: 2025-01-02 | Stop reason: HOSPADM

## 2024-12-29 RX ORDER — DOXYCYCLINE HYCLATE 100 MG
100 TABLET ORAL ONCE
Status: COMPLETED | OUTPATIENT
Start: 2024-12-29 | End: 2024-12-29

## 2024-12-29 RX ORDER — LORATADINE 10 MG/1
10 TABLET ORAL NIGHTLY
COMMUNITY

## 2024-12-29 RX ORDER — CEFTRIAXONE 2 G/50ML
2 INJECTION, SOLUTION INTRAVENOUS ONCE
Status: COMPLETED | OUTPATIENT
Start: 2024-12-29 | End: 2024-12-29

## 2024-12-29 RX ORDER — GUAIFENESIN 600 MG/1
600 TABLET, EXTENDED RELEASE ORAL 2 TIMES DAILY
Status: DISCONTINUED | OUTPATIENT
Start: 2024-12-29 | End: 2025-01-02 | Stop reason: HOSPADM

## 2024-12-29 RX ORDER — ARIPIPRAZOLE 5 MG/1
5 TABLET ORAL DAILY
Status: DISCONTINUED | OUTPATIENT
Start: 2024-12-30 | End: 2025-01-02 | Stop reason: HOSPADM

## 2024-12-29 RX ORDER — CEFTRIAXONE 2 G/50ML
2 INJECTION, SOLUTION INTRAVENOUS EVERY 24 HOURS
Status: DISCONTINUED | OUTPATIENT
Start: 2024-12-29 | End: 2024-12-29

## 2024-12-29 RX ORDER — IPRATROPIUM BROMIDE AND ALBUTEROL SULFATE 2.5; .5 MG/3ML; MG/3ML
3 SOLUTION RESPIRATORY (INHALATION)
Status: DISCONTINUED | OUTPATIENT
Start: 2024-12-29 | End: 2024-12-29

## 2024-12-29 RX ORDER — BUPRENORPHINE HYDROCHLORIDE AND NALOXONE HYDROCHLORIDE DIHYDRATE 2; .5 MG/1; MG/1
1 TABLET SUBLINGUAL 2 TIMES DAILY
Status: DISCONTINUED | OUTPATIENT
Start: 2024-12-29 | End: 2024-12-29

## 2024-12-29 RX ORDER — DIVALPROEX SODIUM 250 MG/1
1000 TABLET, FILM COATED, EXTENDED RELEASE ORAL NIGHTLY
Status: DISCONTINUED | OUTPATIENT
Start: 2024-12-29 | End: 2025-01-02 | Stop reason: HOSPADM

## 2024-12-29 RX ORDER — CALCIUM CARBONATE 200(500)MG
1500 TABLET,CHEWABLE ORAL EVERY 6 HOURS PRN
Status: DISCONTINUED | OUTPATIENT
Start: 2024-12-29 | End: 2025-01-02 | Stop reason: HOSPADM

## 2024-12-29 RX ORDER — DOXYCYCLINE 100 MG/1
100 CAPSULE ORAL 2 TIMES DAILY
Qty: 20 CAPSULE | Refills: 0 | Status: SHIPPED | OUTPATIENT
Start: 2024-12-29 | End: 2025-01-08

## 2024-12-29 RX ORDER — IPRATROPIUM BROMIDE AND ALBUTEROL SULFATE 2.5; .5 MG/3ML; MG/3ML
3 SOLUTION RESPIRATORY (INHALATION) EVERY 2 HOUR PRN
Status: DISCONTINUED | OUTPATIENT
Start: 2024-12-29 | End: 2024-12-30

## 2024-12-29 RX ORDER — ONDANSETRON HYDROCHLORIDE 2 MG/ML
4 INJECTION, SOLUTION INTRAVENOUS EVERY 6 HOURS PRN
Status: DISCONTINUED | OUTPATIENT
Start: 2024-12-29 | End: 2025-01-02 | Stop reason: HOSPADM

## 2024-12-29 RX ORDER — GABAPENTIN 100 MG/1
200 CAPSULE ORAL 3 TIMES DAILY
Status: DISCONTINUED | OUTPATIENT
Start: 2024-12-29 | End: 2025-01-02 | Stop reason: HOSPADM

## 2024-12-29 RX ORDER — OLANZAPINE 5 MG/1
20 TABLET ORAL NIGHTLY
Status: DISCONTINUED | OUTPATIENT
Start: 2024-12-29 | End: 2024-12-29

## 2024-12-29 RX ORDER — CETIRIZINE HYDROCHLORIDE 10 MG/1
10 TABLET ORAL DAILY
Status: DISCONTINUED | OUTPATIENT
Start: 2024-12-30 | End: 2025-01-02 | Stop reason: HOSPADM

## 2024-12-29 RX ORDER — BENZTROPINE MESYLATE 1 MG/1
1 TABLET ORAL 2 TIMES DAILY
Status: DISCONTINUED | OUTPATIENT
Start: 2024-12-29 | End: 2025-01-02 | Stop reason: HOSPADM

## 2024-12-29 RX ORDER — BUPRENORPHINE 2 MG/1
2 TABLET SUBLINGUAL 2 TIMES DAILY
Status: DISCONTINUED | OUTPATIENT
Start: 2024-12-29 | End: 2025-01-02 | Stop reason: HOSPADM

## 2024-12-29 RX ORDER — IBUPROFEN 600 MG/1
600 TABLET ORAL 2 TIMES DAILY PRN
Status: DISCONTINUED | OUTPATIENT
Start: 2024-12-29 | End: 2025-01-02 | Stop reason: HOSPADM

## 2024-12-29 RX ORDER — POLYETHYLENE GLYCOL 3350 17 G/17G
17 POWDER, FOR SOLUTION ORAL DAILY PRN
Status: DISCONTINUED | OUTPATIENT
Start: 2024-12-29 | End: 2025-01-02 | Stop reason: HOSPADM

## 2024-12-29 RX ORDER — AMOXICILLIN AND CLAVULANATE POTASSIUM 875; 125 MG/1; MG/1
1 TABLET, FILM COATED ORAL EVERY 12 HOURS
Qty: 20 TABLET | Refills: 0 | Status: SHIPPED | OUTPATIENT
Start: 2024-12-29 | End: 2025-01-08

## 2024-12-29 RX ORDER — MULTIVITAMIN WITH FOLIC ACID 400 MCG
1 TABLET ORAL DAILY
COMMUNITY

## 2024-12-29 RX ORDER — BUSPIRONE HYDROCHLORIDE 10 MG/1
10 TABLET ORAL 3 TIMES DAILY
Status: DISCONTINUED | OUTPATIENT
Start: 2024-12-29 | End: 2025-01-02 | Stop reason: HOSPADM

## 2024-12-29 RX ORDER — ACETAMINOPHEN 325 MG/1
975 TABLET ORAL ONCE
Status: COMPLETED | OUTPATIENT
Start: 2024-12-29 | End: 2024-12-29

## 2024-12-29 RX ORDER — OLANZAPINE 20 MG/1
20 TABLET ORAL NIGHTLY
COMMUNITY

## 2024-12-29 RX ORDER — ARIPIPRAZOLE 5 MG/1
5 TABLET ORAL DAILY
COMMUNITY

## 2024-12-29 RX ORDER — ALBUTEROL SULFATE 0.83 MG/ML
3 SOLUTION RESPIRATORY (INHALATION) EVERY 4 HOURS PRN
Status: DISCONTINUED | OUTPATIENT
Start: 2024-12-29 | End: 2024-12-29

## 2024-12-29 RX ORDER — ACETAMINOPHEN 325 MG/1
650 TABLET ORAL EVERY 8 HOURS PRN
COMMUNITY

## 2024-12-29 RX ADMIN — TRAZODONE HYDROCHLORIDE 150 MG: 50 TABLET ORAL at 23:20

## 2024-12-29 RX ADMIN — BENZTROPINE MESYLATE 1 MG: 1 TABLET ORAL at 23:12

## 2024-12-29 RX ADMIN — BUSPIRONE HYDROCHLORIDE 10 MG: 10 TABLET ORAL at 23:19

## 2024-12-29 RX ADMIN — CEFTRIAXONE SODIUM 2 G: 2 INJECTION, SOLUTION INTRAVENOUS at 16:55

## 2024-12-29 RX ADMIN — IBUPROFEN 600 MG: 600 TABLET, FILM COATED ORAL at 13:40

## 2024-12-29 RX ADMIN — BUPRENORPHINE 2 MG: 2 TABLET SUBLINGUAL at 23:24

## 2024-12-29 RX ADMIN — DIVALPROEX SODIUM 1000 MG: 250 TABLET, FILM COATED, EXTENDED RELEASE ORAL at 23:12

## 2024-12-29 RX ADMIN — METHYLPREDNISOLONE SODIUM SUCCINATE 40 MG: 40 INJECTION, POWDER, FOR SOLUTION INTRAMUSCULAR; INTRAVENOUS at 23:23

## 2024-12-29 RX ADMIN — GABAPENTIN 200 MG: 100 CAPSULE ORAL at 23:19

## 2024-12-29 RX ADMIN — ACETAMINOPHEN 975 MG: 325 TABLET, FILM COATED ORAL at 13:40

## 2024-12-29 RX ADMIN — IBUPROFEN 600 MG: 600 TABLET, FILM COATED ORAL at 16:54

## 2024-12-29 RX ADMIN — OLANZAPINE 10 MG: 5 TABLET, FILM COATED ORAL at 23:24

## 2024-12-29 RX ADMIN — DOXYCYCLINE HYCLATE 100 MG: 100 TABLET, COATED ORAL at 16:54

## 2024-12-29 RX ADMIN — GUAIFENESIN 600 MG: 600 TABLET, EXTENDED RELEASE ORAL at 23:20

## 2024-12-29 ASSESSMENT — LIFESTYLE VARIABLES
TOTAL SCORE: 0
EVER HAD A DRINK FIRST THING IN THE MORNING TO STEADY YOUR NERVES TO GET RID OF A HANGOVER: NO
EVER FELT BAD OR GUILTY ABOUT YOUR DRINKING: NO
HAVE YOU EVER FELT YOU SHOULD CUT DOWN ON YOUR DRINKING: NO
HAVE PEOPLE ANNOYED YOU BY CRITICIZING YOUR DRINKING: NO

## 2024-12-29 NOTE — ED TRIAGE NOTES
PATIENT BIBA FROM [facility] DUE TO LETHARGY AND CONFUSION. STAFF AT FACILITY STATED THE PATIENT HAS BEEN BECOMING MORE LETHARGIC OVER THE PAST 24 HOURS. THE FACILITY WANTED THE PATIENT TO RECEIVE A PSYCH EVAL DUE TO THE CHANGE IN THE PATIENTS CONDITION. THE PATIENT WAS FOUND TO BE 80% ON ROOM. AFTER BEING PLACED ON 4L NASAL CANNULA THE PATIENT ROS TO 95%.

## 2024-12-29 NOTE — ED PROVIDER NOTES
HPI   Chief Complaint   Patient presents with    Lethargy       Patient presented for evaluation from [facility].  A note from the facility indicates that over the last 24 hours he has had decline mentally and he was lethargic today.  Patient states he rolled out of bed onto his bottom and when I asked him questions he was still waking up from sleep.  Patient is alert awake oriented x 4.  Patient denies any suicidal or homicidal ideation.  Patient does not wish to be assessed in the emergency department.  Patient wishes to leave AGAINST MEDICAL ADVICE this time.  Patient notes he was started on Suboxone 2 days ago.      History provided by:  Patient          Patient History   No past medical history on file.  No past surgical history on file.  No family history on file.  Social History     Tobacco Use    Smoking status: Every Day     Current packs/day: 1.00     Types: Cigarettes    Smokeless tobacco: Not on file   Substance Use Topics    Alcohol use: Yes     Alcohol/week: 1.0 standard drink of alcohol     Types: 1 Shots of liquor per week    Drug use: Not on file       Physical Exam   ED Triage Vitals   Temp Pulse Resp BP   -- -- -- --      SpO2 Temp src Heart Rate Source Patient Position   -- -- -- --      BP Location FiO2 (%)     -- --       Physical Exam  Vitals and nursing note reviewed.   Constitutional:       General: He is not in acute distress.     Appearance: He is normal weight.   HENT:      Head: No Guillermo's sign, abrasion, contusion or laceration.      Right Ear: External ear normal.      Left Ear: External ear normal.      Nose: Nose normal.      Mouth/Throat:      Mouth: Mucous membranes are moist.      Pharynx: Oropharynx is clear.   Eyes:      Extraocular Movements: Extraocular movements intact.      Conjunctiva/sclera: Conjunctivae normal.      Pupils: Pupils are equal, round, and reactive to light.   Neck:      Trachea: Trachea normal.   Cardiovascular:      Rate and Rhythm: Regular  rhythm. Tachycardia present.      Pulses: Normal pulses.           Radial pulses are 2+ on the right side and 2+ on the left side.        Popliteal pulses are 2+ on the right side and 2+ on the left side.   Pulmonary:      Effort: Pulmonary effort is normal.      Breath sounds: Normal breath sounds.   Abdominal:      Palpations: Abdomen is soft.      Tenderness: There is no abdominal tenderness.   Musculoskeletal:      Right shoulder: No bony tenderness.      Left shoulder: No bony tenderness.      Right elbow: No tenderness.      Left elbow: No tenderness.      Right wrist: No bony tenderness or snuff box tenderness.      Left wrist: No bony tenderness or snuff box tenderness.      Cervical back: No bony tenderness. No spinous process tenderness.      Thoracic back: No bony tenderness.      Lumbar back: No bony tenderness.      Right hip: No bony tenderness. Normal range of motion.      Left hip: No bony tenderness. Normal range of motion.      Right knee: No bony tenderness.      Left knee: No bony tenderness.      Right ankle: No tenderness.      Left ankle: No tenderness.   Skin:     General: Skin is warm.      Capillary Refill: Capillary refill takes less than 2 seconds.      Findings: No abrasion or laceration.   Neurological:      General: No focal deficit present.      Mental Status: He is alert and oriented to person, place, and time. Mental status is at baseline.      Sensory: Sensation is intact.      Motor: Motor function is intact.      Coordination: Coordination is intact.   Psychiatric:         Attention and Perception: He does not perceive auditory hallucinations.         Mood and Affect: Mood is anxious.         Behavior: Behavior is agitated.         Thought Content: Thought content does not include homicidal or suicidal ideation.           ED Course & MDM   ED Course as of 12/29/24 1645   Sun Dec 29, 2024   1236 Patient no longer wishes to leave AGAINST MEDICAL ADVICE at this time. [JA]   1324  Patient now has vital signs indicating patient is febrile and tachycardic.  Patient indicates that a neighbor at the facility was recently diagnosed with influenza. [JA]   1415 X-ray reviewed with concern for significant infiltrate on the right side.  Starting broad-spectrum antibiotics with septic protocol this time. [JA]   1441 Updated patient with findings and concern for pneumonia.  Recommended admission.  O2 saturation 91% on room air.  Saturating 94% on 2 L. [JA]   1622 Updated the patient with findings.  Patient again stresses that he wishes to leave at this time.  I stressed to the patient my concern over his x-ray.  I have shown the x-ray to the patient.  Patient still does not wish to stay in the emerge department.  Patient is aware this AGAINST MEDICAL ADVICE.  Patient is aware the potential risks and benefits of his choices and still wishes to leave against medical vice. [JA]      ED Course User Index  [JA] Moshe Johnson DO         Diagnoses as of 12/29/24 1645   Encounter for psychological evaluation   Acute pneumonia   Hypoxia                 No data recorded     [place] Coma Scale Score: 14 (12/29/24 1336 : Mason Shields RN)                           Medical Decision Making  Differential diagnosis: Opioid abuse, polysubstance abuse, psychiatric disorder, metabolic encephalopathy, other    Patient presented for evaluation from [facility].  A note from the facility indicates that over the last 24 hours he has had decline mentally and he was lethargic today.  Patient states he rolled out of bed onto his bottom and when I asked him questions he was still waking up from sleep.  Patient is alert awake oriented x 4.  Patient denies any suicidal or homicidal ideation.  Patient does not wish to be assessed in the emergency department.  Patient wishes to leave AGAINST MEDICAL ADVICE this time.  Patient notes he was started on Suboxone 2 days ago.    No information from the facility  warrants a pink slip or emergency hold for psychiatric evaluation.  Patient is alert awake oriented x 4.  Denies suicidal or homicidal ideation.        Upon further discussion patient does agree to be assessed in the ED.  Patient does have hypoxia and tachycardia.  Patient found to have significant right-sided pneumonia.  Viral swabs negative.  Treated with antipyretics and antibiotics in the ED.  Patient is refusing admission for pneumonia at this time patient again wishes to leave AGAINST MEDICAL ADVICE.  Discussed at length with patient regarding the potential risks and benefits of his choices.  I have instructed the patient that he has a new oxygen requirement and has a high probability of death or disability.  Patient still wishes to leave AGAINST MEDICAL ADVICE.  Will treat patient with Augmentin and doxycycline.    Discussed the differential at length with the patient. Patient is awake, alert, fully oriented. The patient does not appear to be under the influence of any mind altering substances. The patient is logical in conversation and appears to be able to understand and manipulate the information presented to them. The patient stated that they understood all of the above but again would prefer to go home. Again, discussed the differential at length including such etiologies that could cause death, permanent disability, etc. Patient stated that they understood this. The patient was able to meaningfully discuss the potential risks/benefits/alternatives to treatment. I have asked the patient that if their symptoms return or worsen anyway to return immediately to the ED for further evaluation. Patient stated that they understood this. However, at this time cannot convince the patient to stay. Will sign out AGAINST MEDICAL ADVICE.        Procedure  Electrocardiogram, 12-lead    Performed by: Moshe Johnson DO  Authorized by: Moshe Johnosn DO    ECG interpreted by ED Physician in the absence of a  cardiologist: yes    Comments:      12/29/2024 13: 14 sinus tachycardia, rate 122, normal axis, ST segments normal, T waves normal, nonspecific EKG.  EKG interpreted by myself.       Moshe Johnson, DO  12/29/24 1215       Moshe Johnson, DO  12/29/24 1325       Moshe Johnson, DO  12/29/24 1328       Moshe Johnson, DO  12/29/24 1646

## 2024-12-29 NOTE — PROGRESS NOTES
Pharmacy Medication History Review    Jesse Rico is a 39 y.o. male admitted for No Principal Problem: There is no principal problem currently on the Problem List. Please update the Problem List and refresh.. Pharmacy reviewed the patient's [id number] medications and allergies for accuracy.    The list below reflectives the updated PTA list. Please review each medication in order reconciliation for additional clarification and justification.  Prior to Admission medications    Medication Sig Start Date End Date Authorizing Provider   acetaminophen (Tylenol) 325 mg tablet Take 2 tablets (650 mg) by mouth every 8 hours if needed for mild pain (1 - 3).   Historical Provider, MD   ARIPiprazole (Abilify) 5 mg tablet Take 1 tablet (5 mg) by mouth once daily.   Historical Provider, MD   buprenorphine-naloxone (Suboxone) 2-0.5 mg SL tablet Place 1 tablet under the tongue 2 times a day.   Historical Provider, MD   ibuprofen 600 mg tablet Take 1 tablet (600 mg) by mouth 2 times a day as needed for mild pain (1 - 3).   Historical Provider, MD   loratadine (Claritin) 10 mg tablet Take 1 tablet (10 mg) by mouth once daily at bedtime.   Historical Provider, MD   OLANZapine (ZyPREXA) 20 mg tablet Take 1 tablet (20 mg) by mouth once daily at bedtime.   Historical Provider, MD   Tab-A-John 400 mcg tablet Take 1 tablet by mouth once daily.   Historical Provider, MD   Tums 300 mg (750 mg) chewable tablet Chew 2-4 tablets (1,500-3,000 mg) if needed for indigestion or heartburn.   Historical Provider, MD   benztropine (Cogentin) 1 mg tablet Take 1 tablet (1 mg) by mouth 2 times a day.   Historical Provider, MD   busPIRone (Buspar) 10 mg tablet Take 1 tablet (10 mg) by mouth 3 times a day.   Historical Provider, MD   divalproex (Depakote ER) 500 mg 24 hr tablet Take 2 tablets (1,000 mg) by mouth once daily at bedtime.   Historical Provider, MD   gabapentin (Neurontin) 100 mg capsule Take 2 capsules (200 mg) by mouth 3 times a day.    Historical Provider, MD   haloperidol decanoate (Haldol Decanoate) 100 mg/mL injection Inject 2 mL (200 mg) into the muscle every 28 (twenty-eight) days.   Historical Provider, MD   OLANZapine (ZyPREXA) 10 mg tablet Take 1 tablet (10 mg) by mouth once daily at bedtime.   Historical Provider, MD   traZODone (Desyrel) 150 mg tablet Take 1 tablet (150 mg) by mouth once daily at bedtime.   Historical Provider, MD        The list below reflectives the updated allergy list. Please review each documented allergy for additional clarification and justification.  Allergies  Reviewed by Sarah Combs on 12/29/2024        Severity Reactions Comments    Lactose Medium Diarrhea, Nausea/vomiting     Risperidone Not Specified Unknown     Banana Low Itching     Codeine Low Hives     Hydrocodone Low Hives             Below are additional concerns with the patient's PTA list.      Sarah Combs

## 2024-12-30 LAB
ALBUMIN SERPL BCP-MCNC: 3.8 G/DL (ref 3.4–5)
ALP SERPL-CCNC: 43 U/L (ref 33–120)
ALT SERPL W P-5'-P-CCNC: 33 U/L (ref 10–52)
ANION GAP SERPL CALC-SCNC: 10 MMOL/L (ref 10–20)
AST SERPL W P-5'-P-CCNC: 50 U/L (ref 9–39)
ATRIAL RATE: 121 BPM
BILIRUB SERPL-MCNC: 0.6 MG/DL (ref 0–1.2)
BUN SERPL-MCNC: 14 MG/DL (ref 6–23)
CALCIUM SERPL-MCNC: 8.2 MG/DL (ref 8.6–10.3)
CHLORIDE SERPL-SCNC: 98 MMOL/L (ref 98–107)
CO2 SERPL-SCNC: 33 MMOL/L (ref 21–32)
CREAT SERPL-MCNC: 1.05 MG/DL (ref 0.5–1.3)
EGFRCR SERPLBLD CKD-EPI 2021: >90 ML/MIN/1.73M*2
ERYTHROCYTE [DISTWIDTH] IN BLOOD BY AUTOMATED COUNT: 12.3 % (ref 11.5–14.5)
GLUCOSE SERPL-MCNC: 102 MG/DL (ref 74–99)
HCT VFR BLD AUTO: 37 % (ref 41–52)
HGB BLD-MCNC: 11.9 G/DL (ref 13.5–17.5)
MCH RBC QN AUTO: 28.4 PG (ref 26–34)
MCHC RBC AUTO-ENTMCNC: 32.2 G/DL (ref 32–36)
MCV RBC AUTO: 88 FL (ref 80–100)
NRBC BLD-RTO: 0 /100 WBCS (ref 0–0)
P AXIS: 54 DEGREES
PLATELET # BLD AUTO: 154 X10*3/UL (ref 150–450)
POTASSIUM SERPL-SCNC: 4.1 MMOL/L (ref 3.5–5.3)
PR INTERVAL: 140 MS
PROT SERPL-MCNC: 6.3 G/DL (ref 6.4–8.2)
Q ONSET: 252 MS
QRS COUNT: 19 BEATS
QRS DURATION: 72 MS
QT INTERVAL: 291 MS
QTC CALCULATION(BAZETT): 415 MS
QTC FREDERICIA: 368 MS
R AXIS: 76 DEGREES
RBC # BLD AUTO: 4.19 X10*6/UL (ref 4.5–5.9)
SODIUM SERPL-SCNC: 137 MMOL/L (ref 136–145)
T AXIS: 41 DEGREES
T OFFSET: 397 MS
VENTRICULAR RATE: 122 BPM
WBC # BLD AUTO: 9.2 X10*3/UL (ref 4.4–11.3)

## 2024-12-30 PROCEDURE — 94640 AIRWAY INHALATION TREATMENT: CPT

## 2024-12-30 PROCEDURE — 2500000004 HC RX 250 GENERAL PHARMACY W/ HCPCS (ALT 636 FOR OP/ED)

## 2024-12-30 PROCEDURE — 36415 COLL VENOUS BLD VENIPUNCTURE: CPT

## 2024-12-30 PROCEDURE — 2500000001 HC RX 250 WO HCPCS SELF ADMINISTERED DRUGS (ALT 637 FOR MEDICARE OP)

## 2024-12-30 PROCEDURE — 87205 SMEAR GRAM STAIN: CPT | Mod: PARLAB

## 2024-12-30 PROCEDURE — 1100000001 HC PRIVATE ROOM DAILY

## 2024-12-30 PROCEDURE — 85027 COMPLETE CBC AUTOMATED: CPT

## 2024-12-30 PROCEDURE — 84075 ASSAY ALKALINE PHOSPHATASE: CPT

## 2024-12-30 PROCEDURE — 2500000002 HC RX 250 W HCPCS SELF ADMINISTERED DRUGS (ALT 637 FOR MEDICARE OP, ALT 636 FOR OP/ED): Performed by: INTERNAL MEDICINE

## 2024-12-30 PROCEDURE — 2500000002 HC RX 250 W HCPCS SELF ADMINISTERED DRUGS (ALT 637 FOR MEDICARE OP, ALT 636 FOR OP/ED)

## 2024-12-30 RX ADMIN — GABAPENTIN 200 MG: 100 CAPSULE ORAL at 09:19

## 2024-12-30 RX ADMIN — METHYLPREDNISOLONE SODIUM SUCCINATE 40 MG: 40 INJECTION, POWDER, FOR SOLUTION INTRAMUSCULAR; INTRAVENOUS at 14:29

## 2024-12-30 RX ADMIN — GUAIFENESIN 600 MG: 600 TABLET, EXTENDED RELEASE ORAL at 21:54

## 2024-12-30 RX ADMIN — GUAIFENESIN 600 MG: 600 TABLET, EXTENDED RELEASE ORAL at 09:20

## 2024-12-30 RX ADMIN — BENZTROPINE MESYLATE 1 MG: 1 TABLET ORAL at 23:40

## 2024-12-30 RX ADMIN — BUPRENORPHINE 2 MG: 2 TABLET SUBLINGUAL at 09:19

## 2024-12-30 RX ADMIN — DOXYCYCLINE 100 MG: 100 INJECTION, POWDER, LYOPHILIZED, FOR SOLUTION INTRAVENOUS at 05:24

## 2024-12-30 RX ADMIN — METHYLPREDNISOLONE SODIUM SUCCINATE 40 MG: 40 INJECTION, POWDER, FOR SOLUTION INTRAMUSCULAR; INTRAVENOUS at 21:59

## 2024-12-30 RX ADMIN — TRAZODONE HYDROCHLORIDE 150 MG: 50 TABLET ORAL at 21:54

## 2024-12-30 RX ADMIN — OLANZAPINE 10 MG: 5 TABLET, FILM COATED ORAL at 21:54

## 2024-12-30 RX ADMIN — CEFTRIAXONE SODIUM 2 G: 2 INJECTION, SOLUTION INTRAVENOUS at 11:09

## 2024-12-30 RX ADMIN — ALBUTEROL SULFATE 3 ML: 2.5 SOLUTION RESPIRATORY (INHALATION) at 21:09

## 2024-12-30 RX ADMIN — BUSPIRONE HYDROCHLORIDE 10 MG: 10 TABLET ORAL at 14:31

## 2024-12-30 RX ADMIN — BENZTROPINE MESYLATE 1 MG: 1 TABLET ORAL at 09:22

## 2024-12-30 RX ADMIN — BUSPIRONE HYDROCHLORIDE 10 MG: 10 TABLET ORAL at 09:31

## 2024-12-30 RX ADMIN — GABAPENTIN 200 MG: 100 CAPSULE ORAL at 21:53

## 2024-12-30 RX ADMIN — CETIRIZINE HYDROCHLORIDE 10 MG: 10 TABLET, FILM COATED ORAL at 09:19

## 2024-12-30 RX ADMIN — BUSPIRONE HYDROCHLORIDE 10 MG: 10 TABLET ORAL at 21:53

## 2024-12-30 RX ADMIN — BUPRENORPHINE 2 MG: 2 TABLET SUBLINGUAL at 21:53

## 2024-12-30 RX ADMIN — DIVALPROEX SODIUM 1000 MG: 250 TABLET, FILM COATED, EXTENDED RELEASE ORAL at 21:53

## 2024-12-30 RX ADMIN — DOXYCYCLINE 100 MG: 100 INJECTION, POWDER, LYOPHILIZED, FOR SOLUTION INTRAVENOUS at 18:11

## 2024-12-30 RX ADMIN — GABAPENTIN 200 MG: 100 CAPSULE ORAL at 14:29

## 2024-12-30 SDOH — ECONOMIC STABILITY: INCOME INSECURITY: IN THE PAST 12 MONTHS HAS THE ELECTRIC, GAS, OIL, OR WATER COMPANY THREATENED TO SHUT OFF SERVICES IN YOUR HOME?: NO

## 2024-12-30 SDOH — SOCIAL STABILITY: SOCIAL INSECURITY: WERE YOU ABLE TO COMPLETE ALL THE BEHAVIORAL HEALTH SCREENINGS?: YES

## 2024-12-30 SDOH — SOCIAL STABILITY: SOCIAL INSECURITY: HAS ANYONE EVER THREATENED TO HURT YOUR FAMILY OR YOUR PETS?: NO

## 2024-12-30 SDOH — ECONOMIC STABILITY: FOOD INSECURITY: WITHIN THE PAST 12 MONTHS, THE FOOD YOU BOUGHT JUST DIDN'T LAST AND YOU DIDN'T HAVE MONEY TO GET MORE.: NEVER TRUE

## 2024-12-30 SDOH — ECONOMIC STABILITY: FOOD INSECURITY: HOW HARD IS IT FOR YOU TO PAY FOR THE VERY BASICS LIKE FOOD, HOUSING, MEDICAL CARE, AND HEATING?: NOT VERY HARD

## 2024-12-30 SDOH — SOCIAL STABILITY: SOCIAL INSECURITY: ABUSE: ADULT

## 2024-12-30 SDOH — SOCIAL STABILITY: SOCIAL INSECURITY: WITHIN THE LAST YEAR, HAVE YOU BEEN AFRAID OF YOUR PARTNER OR EX-PARTNER?: NO

## 2024-12-30 SDOH — ECONOMIC STABILITY: HOUSING INSECURITY: IN THE PAST 12 MONTHS, HOW MANY TIMES HAVE YOU MOVED WHERE YOU WERE LIVING?: 1

## 2024-12-30 SDOH — ECONOMIC STABILITY: HOUSING INSECURITY: IN THE LAST 12 MONTHS, WAS THERE A TIME WHEN YOU WERE NOT ABLE TO PAY THE MORTGAGE OR RENT ON TIME?: NO

## 2024-12-30 SDOH — SOCIAL STABILITY: SOCIAL INSECURITY: WITHIN THE LAST YEAR, HAVE YOU BEEN HUMILIATED OR EMOTIONALLY ABUSED IN OTHER WAYS BY YOUR PARTNER OR EX-PARTNER?: NO

## 2024-12-30 SDOH — SOCIAL STABILITY: SOCIAL INSECURITY: ARE THERE ANY APPARENT SIGNS OF INJURIES/BEHAVIORS THAT COULD BE RELATED TO ABUSE/NEGLECT?: NO

## 2024-12-30 SDOH — SOCIAL STABILITY: SOCIAL INSECURITY: DO YOU FEEL ANYONE HAS EXPLOITED OR TAKEN ADVANTAGE OF YOU FINANCIALLY OR OF YOUR PERSONAL PROPERTY?: NO

## 2024-12-30 SDOH — SOCIAL STABILITY: SOCIAL INSECURITY: DOES ANYONE TRY TO KEEP YOU FROM HAVING/CONTACTING OTHER FRIENDS OR DOING THINGS OUTSIDE YOUR HOME?: NO

## 2024-12-30 SDOH — ECONOMIC STABILITY: FOOD INSECURITY: WITHIN THE PAST 12 MONTHS, YOU WORRIED THAT YOUR FOOD WOULD RUN OUT BEFORE YOU GOT THE MONEY TO BUY MORE.: NEVER TRUE

## 2024-12-30 SDOH — ECONOMIC STABILITY: HOUSING INSECURITY: AT ANY TIME IN THE PAST 12 MONTHS, WERE YOU HOMELESS OR LIVING IN A SHELTER (INCLUDING NOW)?: NO

## 2024-12-30 SDOH — SOCIAL STABILITY: SOCIAL INSECURITY: HAVE YOU HAD THOUGHTS OF HARMING ANYONE ELSE?: NO

## 2024-12-30 SDOH — ECONOMIC STABILITY: TRANSPORTATION INSECURITY: IN THE PAST 12 MONTHS, HAS LACK OF TRANSPORTATION KEPT YOU FROM MEDICAL APPOINTMENTS OR FROM GETTING MEDICATIONS?: NO

## 2024-12-30 SDOH — SOCIAL STABILITY: SOCIAL INSECURITY: DO YOU FEEL UNSAFE GOING BACK TO THE PLACE WHERE YOU ARE LIVING?: NO

## 2024-12-30 SDOH — SOCIAL STABILITY: SOCIAL INSECURITY: ARE YOU OR HAVE YOU BEEN THREATENED OR ABUSED PHYSICALLY, EMOTIONALLY, OR SEXUALLY BY ANYONE?: NO

## 2024-12-30 SDOH — SOCIAL STABILITY: SOCIAL INSECURITY: HAVE YOU HAD ANY THOUGHTS OF HARMING ANYONE ELSE?: NO

## 2024-12-30 ASSESSMENT — LIFESTYLE VARIABLES
SUBSTANCE_ABUSE_PAST_12_MONTHS: YES
AUDIT-C TOTAL SCORE: 0
HOW MANY STANDARD DRINKS CONTAINING ALCOHOL DO YOU HAVE ON A TYPICAL DAY: PATIENT DOES NOT DRINK
HOW OFTEN DO YOU HAVE A DRINK CONTAINING ALCOHOL: NEVER
AUDIT-C TOTAL SCORE: 0
HOW OFTEN DO YOU HAVE 6 OR MORE DRINKS ON ONE OCCASION: NEVER
SKIP TO QUESTIONS 9-10: 1
PRESCIPTION_ABUSE_PAST_12_MONTHS: NO

## 2024-12-30 ASSESSMENT — ACTIVITIES OF DAILY LIVING (ADL)
ADEQUATE_TO_COMPLETE_ADL: YES
WALKS IN HOME: INDEPENDENT
BATHING: INDEPENDENT
LACK_OF_TRANSPORTATION: NO
PATIENT'S MEMORY ADEQUATE TO SAFELY COMPLETE DAILY ACTIVITIES?: YES
LACK_OF_TRANSPORTATION: NO
TOILETING: INDEPENDENT
HEARING - LEFT EAR: FUNCTIONAL
JUDGMENT_ADEQUATE_SAFELY_COMPLETE_DAILY_ACTIVITIES: YES
GROOMING: INDEPENDENT
DRESSING YOURSELF: INDEPENDENT
FEEDING YOURSELF: INDEPENDENT
HEARING - RIGHT EAR: FUNCTIONAL

## 2024-12-30 ASSESSMENT — PAIN SCALES - GENERAL
PAINLEVEL_OUTOF10: 0 - NO PAIN

## 2024-12-30 ASSESSMENT — COLUMBIA-SUICIDE SEVERITY RATING SCALE - C-SSRS
1. IN THE PAST MONTH, HAVE YOU WISHED YOU WERE DEAD OR WISHED YOU COULD GO TO SLEEP AND NOT WAKE UP?: NO
6. HAVE YOU EVER DONE ANYTHING, STARTED TO DO ANYTHING, OR PREPARED TO DO ANYTHING TO END YOUR LIFE?: NO
2. HAVE YOU ACTUALLY HAD ANY THOUGHTS OF KILLING YOURSELF?: NO

## 2024-12-30 ASSESSMENT — PATIENT HEALTH QUESTIONNAIRE - PHQ9
2. FEELING DOWN, DEPRESSED OR HOPELESS: NOT AT ALL
1. LITTLE INTEREST OR PLEASURE IN DOING THINGS: NOT AT ALL
SUM OF ALL RESPONSES TO PHQ9 QUESTIONS 1 & 2: 0

## 2024-12-30 ASSESSMENT — COGNITIVE AND FUNCTIONAL STATUS - GENERAL
CLIMB 3 TO 5 STEPS WITH RAILING: A LITTLE
PATIENT BASELINE BEDBOUND: NO
MOBILITY SCORE: 23
DAILY ACTIVITIY SCORE: 24

## 2024-12-30 NOTE — H&P
History Of Present Illness  Jesse Rico is a 39-year-old male who presents to the ED with lethargy.  Patient has a past medical history of polysubstance use on Suboxone, schizoaffective disorder.  Patient is from [facility] rehab facility and it was noted that patient had increasing mental decline with lethargy today.  Patient ended up rolling out of bed onto his bottom at 1 point today.  Patient seen in ED, he is currently pacing around but is able to answer questions appropriately, appears internally stimulated as he was talking a lot to himself while this provider was walking up.  Patient is alert and oriented x 4 at this time.  Patient denies any shortness of breath, cough, chest pain, urinary problems, or dizziness.  Patient does not seem to be able to provide a thorough/consistent story as to why he is here.     ED Course      Hemodynamically Stable.    Vitals: Temp. 38.5, , Resp.  16, /64, Pulse ox 86% on RA now 94% NC     Labs: Glucose 103, Na+ 135, K+ 4.7, Bun 11, Creat.  1.26, GFR 75, Ca 8.8, Albumin 4.2, Alk Phos. 49, ALT 35, AST 39, Lactate 1.0, WBC 14.8,  Hgb.  13.7 Hct.  41.0, flu/COVID-negative, tox screen negative  Medications: Tylenol, ceftriaxone, doxycycline, Motrin,  Imaging: interpreted by radiologist.  Chest x-ray: Dense airspace consolidation seen throughout right mid to lower lung concerning for pneumonia.    EKG: Not available for my review.             Past Medical History  As noted above.    Surgical History  Patient is noncontributory     Social History  Nuys any current smoking tobacco/marijuana/illicit drug use as he is at a rehab center currently.  Patient reports he plans to transfer from the rehab center to a sober living house.    Family History  Patient is noncontributory     Allergies  Lactose, Risperidone, Banana, Codeine, and Hydrocodone    Review of Systems     10 point ROS systems completed and is negative except for what is stated in HPI.     Physical  "Exam  Constitutional:       General: He is awake. He is not in acute distress.     Appearance: Normal appearance. He is well-developed. He is not toxic-appearing.   HENT:      Head: Normocephalic and atraumatic.      Nose: Nose normal. No rhinorrhea.      Mouth/Throat:      Mouth: Mucous membranes are moist.   Eyes:      General:         Right eye: No discharge.         Left eye: No discharge.      Conjunctiva/sclera: Conjunctivae normal.      Pupils: Pupils are equal, round, and reactive to light.   Cardiovascular:      Rate and Rhythm: Normal rate and regular rhythm.      Pulses: Normal pulses.   Pulmonary:      Effort: Pulmonary effort is normal. No respiratory distress.      Breath sounds: Examination of the right-middle field reveals rhonchi. Examination of the right-lower field reveals rhonchi. Rhonchi present. No wheezing.   Abdominal:      General: Bowel sounds are normal. There is no distension.      Palpations: Abdomen is soft.      Tenderness: There is no abdominal tenderness. There is no guarding.   Musculoskeletal:         General: Normal range of motion.      Cervical back: Normal range of motion and neck supple.      Right lower leg: No edema.      Left lower leg: No edema.   Skin:     General: Skin is warm and dry.   Neurological:      General: No focal deficit present.      Mental Status: He is alert and oriented to person, place, and time. Mental status is at baseline.      Motor: No weakness.   Psychiatric:         Attention and Perception: He is inattentive.         Mood and Affect: Mood normal.         Speech: Speech normal.         Behavior: Behavior is hyperactive.      Comments: And appears internally stimulated as he was having a conversation with himself.  Pacing back-and-forth in the hallway.          Last Recorded Vitals  Blood pressure 129/78, pulse (!) 107, temperature (!) 38.5 °C (101.3 °F), temperature source Temporal, resp. rate 18, height 1.778 m (5' 10\"), weight 65.8 kg (145 lb), " SpO2 96%.    Relevant Results  Results for orders placed or performed during the hospital encounter of 12/29/24 (from the past 24 hours)   Drug Screen, Urine   Result Value Ref Range    Amphetamine Screen, Urine Presumptive Negative Presumptive Negative    Barbiturate Screen, Urine Presumptive Negative Presumptive Negative    Benzodiazepines Screen, Urine Presumptive Negative Presumptive Negative    Cannabinoid Screen, Urine Presumptive Negative Presumptive Negative    Cocaine Metabolite Screen, Urine Presumptive Negative Presumptive Negative    Fentanyl Screen, Urine Presumptive Negative Presumptive Negative    Opiate Screen, Urine Presumptive Negative Presumptive Negative    Oxycodone Screen, Urine Presumptive Negative Presumptive Negative    PCP Screen, Urine Presumptive Negative Presumptive Negative    Methadone Screen, Urine Presumptive Negative Presumptive Negative   CBC and Auto Differential   Result Value Ref Range    WBC 14.8 (H) 4.4 - 11.3 x10*3/uL    nRBC 0.0 0.0 - 0.0 /100 WBCs    RBC 4.75 4.50 - 5.90 x10*6/uL    Hemoglobin 13.7 13.5 - 17.5 g/dL    Hematocrit 41.0 41.0 - 52.0 %    MCV 86 80 - 100 fL    MCH 28.8 26.0 - 34.0 pg    MCHC 33.4 32.0 - 36.0 g/dL    RDW 12.2 11.5 - 14.5 %    Platelets 196 150 - 450 x10*3/uL    Neutrophils % 81.1 40.0 - 80.0 %    Immature Granulocytes %, Automated 0.4 0.0 - 0.9 %    Lymphocytes % 8.2 13.0 - 44.0 %    Monocytes % 10.0 2.0 - 10.0 %    Eosinophils % 0.1 0.0 - 6.0 %    Basophils % 0.2 0.0 - 2.0 %    Neutrophils Absolute 12.02 (H) 1.20 - 7.70 x10*3/uL    Immature Granulocytes Absolute, Automated 0.06 0.00 - 0.70 x10*3/uL    Lymphocytes Absolute 1.22 1.20 - 4.80 x10*3/uL    Monocytes Absolute 1.48 (H) 0.10 - 1.00 x10*3/uL    Eosinophils Absolute 0.01 0.00 - 0.70 x10*3/uL    Basophils Absolute 0.03 0.00 - 0.10 x10*3/uL   Comprehensive Metabolic Panel   Result Value Ref Range    Glucose 103 (H) 74 - 99 mg/dL    Sodium 135 (L) 136 - 145 mmol/L    Potassium 4.7 3.5 - 5.3  mmol/L    Chloride 98 98 - 107 mmol/L    Bicarbonate 29 21 - 32 mmol/L    Anion Gap 13 10 - 20 mmol/L    Urea Nitrogen 11 6 - 23 mg/dL    Creatinine 1.26 0.50 - 1.30 mg/dL    eGFR 75 >60 mL/min/1.73m*2    Calcium 8.8 8.6 - 10.3 mg/dL    Albumin 4.2 3.4 - 5.0 g/dL    Alkaline Phosphatase 49 33 - 120 U/L    Total Protein 6.7 6.4 - 8.2 g/dL    AST 39 9 - 39 U/L    Bilirubin, Total 0.8 0.0 - 1.2 mg/dL    ALT 35 10 - 52 U/L   Acute Toxicology Panel, Blood   Result Value Ref Range    Acetaminophen <10.0 10.0 - 30.0 ug/mL    Salicylate  <3 4 - 20 mg/dL    Alcohol 11 (H) <=10 mg/dL   Sars-CoV-2 PCR   Result Value Ref Range    Coronavirus 2019, PCR Not Detected Not Detected   Influenza A, and B PCR   Result Value Ref Range    Flu A Result Not Detected Not Detected    Flu B Result Not Detected Not Detected   Lactate   Result Value Ref Range    Lactate 1.0 0.4 - 2.0 mmol/L     XR chest 2 views    Result Date: 12/29/2024  Interpreted By:  Gerhard Vega, STUDY: XR CHEST 2 VIEWS  12/29/2024 1:49 pm   INDICATION: Signs/Symptoms:cough   COMPARISON: 08/23/2024   ACCESSION NUMBER(S): [id number]   ORDERING CLINICIAN: NGA GBARIEL   TECHNIQUE: PA and lateral views of the chest were obtained.   FINDINGS: Dense airspace consolidation seen throughout right mid to lower lung concerning for pneumonia. No pleural effusion or pneumothorax is identified. The cardiac silhouette is within normal limits for size.       Right-sided airspace consolidation,. Clinical correlation continued follow-up until clearing is recommended.   MACRO: None.   Signed by: Gerhard Vega 12/29/2024 1:57 PM Dictation workstation:   [id number]        Assessment/Plan   Assessment & Plan  Acute pneumonia      Patient arrived to ED today after having mental decline while at a rehab center.  Patient rolled out of his bed today on his bottom and was previously confused at facility.  Patient found to have pneumonia in right lung on chest x-ray.  Patient with a fever on  arrival.  Patient denied any shortness of breath or cough but did require oxygen as he was 86% on room air.  Antibiotics continued ceftriaxone/doxycycline.  Solu-Medrol initiated, will continue to monitor oxygen.     Patient admitted to [facility] for further medical management.     # Pneumonia  # Acute hypoxia  # Leukocytosis  # Tachycardia  # Lethargy     -Antibiotics initiated in ED, will continue ceftriaxone, doxycycline   -Initiate Solu-Medrol 40 mg every 8hrs   -DuoNeb scheduled, albuterol as needed   -Encourage incentive spirometry  -Sputum culture  -Mucinex twice daily  -Strep pneumo/Legionella urine ordered   -As needed oxygen >92%   -Regular diet   -admitted to inpatient with continuous pulse ox  -q4 vitals   -morning labs, trend WBC     Chronic Conditions   # Schizoaffective disorder  # Substance abuse on Suboxone     Continue home medications as ordered     Full Code      #DVT Prophylaxis   Scd's as tolerated   DVT Prophylaxis Heparin            I spent 45 minutes in the professional and overall care of this patient.      KIARA Alonzo-CNP

## 2024-12-31 LAB
ANION GAP SERPL CALC-SCNC: 12 MMOL/L (ref 10–20)
BACTERIA SPEC RESP CULT: ABNORMAL
BUN SERPL-MCNC: 9 MG/DL (ref 6–23)
CALCIUM SERPL-MCNC: 8.6 MG/DL (ref 8.6–10.3)
CHLORIDE SERPL-SCNC: 96 MMOL/L (ref 98–107)
CO2 SERPL-SCNC: 30 MMOL/L (ref 21–32)
CREAT SERPL-MCNC: 0.77 MG/DL (ref 0.5–1.3)
EGFRCR SERPLBLD CKD-EPI 2021: >90 ML/MIN/1.73M*2
ERYTHROCYTE [DISTWIDTH] IN BLOOD BY AUTOMATED COUNT: 12 % (ref 11.5–14.5)
GLUCOSE SERPL-MCNC: 180 MG/DL (ref 74–99)
GRAM STN SPEC: ABNORMAL
HCT VFR BLD AUTO: 34.3 % (ref 41–52)
HGB BLD-MCNC: 11.4 G/DL (ref 13.5–17.5)
MCH RBC QN AUTO: 28.9 PG (ref 26–34)
MCHC RBC AUTO-ENTMCNC: 33.2 G/DL (ref 32–36)
MCV RBC AUTO: 87 FL (ref 80–100)
NRBC BLD-RTO: 0 /100 WBCS (ref 0–0)
PLATELET # BLD AUTO: 144 X10*3/UL (ref 150–450)
POTASSIUM SERPL-SCNC: 4.2 MMOL/L (ref 3.5–5.3)
RBC # BLD AUTO: 3.95 X10*6/UL (ref 4.5–5.9)
SODIUM SERPL-SCNC: 134 MMOL/L (ref 136–145)
WBC # BLD AUTO: 13.6 X10*3/UL (ref 4.4–11.3)

## 2024-12-31 PROCEDURE — 2500000004 HC RX 250 GENERAL PHARMACY W/ HCPCS (ALT 636 FOR OP/ED)

## 2024-12-31 PROCEDURE — 1100000001 HC PRIVATE ROOM DAILY

## 2024-12-31 PROCEDURE — 80048 BASIC METABOLIC PNL TOTAL CA: CPT

## 2024-12-31 PROCEDURE — 85027 COMPLETE CBC AUTOMATED: CPT

## 2024-12-31 PROCEDURE — 2500000002 HC RX 250 W HCPCS SELF ADMINISTERED DRUGS (ALT 637 FOR MEDICARE OP, ALT 636 FOR OP/ED)

## 2024-12-31 PROCEDURE — 36415 COLL VENOUS BLD VENIPUNCTURE: CPT

## 2024-12-31 PROCEDURE — 2500000001 HC RX 250 WO HCPCS SELF ADMINISTERED DRUGS (ALT 637 FOR MEDICARE OP)

## 2024-12-31 RX ADMIN — METHYLPREDNISOLONE SODIUM SUCCINATE 40 MG: 40 INJECTION, POWDER, FOR SOLUTION INTRAMUSCULAR; INTRAVENOUS at 21:56

## 2024-12-31 RX ADMIN — GUAIFENESIN 600 MG: 600 TABLET, EXTENDED RELEASE ORAL at 08:43

## 2024-12-31 RX ADMIN — CETIRIZINE HYDROCHLORIDE 10 MG: 10 TABLET, FILM COATED ORAL at 08:43

## 2024-12-31 RX ADMIN — GUAIFENESIN 600 MG: 600 TABLET, EXTENDED RELEASE ORAL at 21:56

## 2024-12-31 RX ADMIN — DIVALPROEX SODIUM 1000 MG: 250 TABLET, FILM COATED, EXTENDED RELEASE ORAL at 21:56

## 2024-12-31 RX ADMIN — METHYLPREDNISOLONE SODIUM SUCCINATE 40 MG: 40 INJECTION, POWDER, FOR SOLUTION INTRAMUSCULAR; INTRAVENOUS at 05:36

## 2024-12-31 RX ADMIN — BENZTROPINE MESYLATE 1 MG: 1 TABLET ORAL at 21:56

## 2024-12-31 RX ADMIN — BUSPIRONE HYDROCHLORIDE 10 MG: 10 TABLET ORAL at 08:43

## 2024-12-31 RX ADMIN — BUSPIRONE HYDROCHLORIDE 10 MG: 10 TABLET ORAL at 21:56

## 2024-12-31 RX ADMIN — BUPRENORPHINE 2 MG: 2 TABLET SUBLINGUAL at 08:42

## 2024-12-31 RX ADMIN — GABAPENTIN 200 MG: 100 CAPSULE ORAL at 08:43

## 2024-12-31 RX ADMIN — TRAZODONE HYDROCHLORIDE 150 MG: 50 TABLET ORAL at 21:56

## 2024-12-31 RX ADMIN — BENZTROPINE MESYLATE 1 MG: 1 TABLET ORAL at 08:43

## 2024-12-31 RX ADMIN — BUPRENORPHINE 2 MG: 2 TABLET SUBLINGUAL at 21:56

## 2024-12-31 RX ADMIN — DOXYCYCLINE 100 MG: 100 INJECTION, POWDER, LYOPHILIZED, FOR SOLUTION INTRAVENOUS at 16:17

## 2024-12-31 RX ADMIN — CEFTRIAXONE SODIUM 2 G: 2 INJECTION, SOLUTION INTRAVENOUS at 09:33

## 2024-12-31 RX ADMIN — METHYLPREDNISOLONE SODIUM SUCCINATE 40 MG: 40 INJECTION, POWDER, FOR SOLUTION INTRAMUSCULAR; INTRAVENOUS at 14:35

## 2024-12-31 RX ADMIN — ARIPIPRAZOLE 5 MG: 5 TABLET ORAL at 08:42

## 2024-12-31 RX ADMIN — DOXYCYCLINE 100 MG: 100 INJECTION, POWDER, LYOPHILIZED, FOR SOLUTION INTRAVENOUS at 05:16

## 2024-12-31 RX ADMIN — OLANZAPINE 10 MG: 5 TABLET, FILM COATED ORAL at 21:56

## 2024-12-31 RX ADMIN — GABAPENTIN 200 MG: 100 CAPSULE ORAL at 14:35

## 2024-12-31 RX ADMIN — BUSPIRONE HYDROCHLORIDE 10 MG: 10 TABLET ORAL at 14:35

## 2024-12-31 RX ADMIN — GABAPENTIN 200 MG: 100 CAPSULE ORAL at 21:56

## 2024-12-31 ASSESSMENT — COGNITIVE AND FUNCTIONAL STATUS - GENERAL
MOBILITY SCORE: 23
DAILY ACTIVITIY SCORE: 24
CLIMB 3 TO 5 STEPS WITH RAILING: A LITTLE

## 2024-12-31 ASSESSMENT — PAIN SCALES - GENERAL
PAINLEVEL_OUTOF10: 0 - NO PAIN
PAINLEVEL_OUTOF10: 0 - NO PAIN

## 2024-12-31 ASSESSMENT — PAIN - FUNCTIONAL ASSESSMENT
PAIN_FUNCTIONAL_ASSESSMENT: 0-10
PAIN_FUNCTIONAL_ASSESSMENT: 0-10

## 2024-12-31 NOTE — PROGRESS NOTES
Jesse Rico is a 39 y.o. male on day 1 of admission presenting with Acute pneumonia.      Subjective    39-year-old male who presents to the ED with lethargy.  Patient has a past medical history of polysubstance use on Suboxone, schizoaffective disorder.  Patient is from [facility] and it was noted that patient had increasing mental decline with lethargy today.  Patient ended up rolling out of bed onto his bottom at 1 point today.  Patient seen in ED, he is currently pacing around but is able to answer questions appropriately, appears internally stimulated as he was talking a lot to himself while this provider was walking up.  Patient is alert and oriented x 4 at this time.  Patient denies any shortness of breath, cough, chest pain, urinary problems, or dizziness.  Patient does not seem to be able to provide a thorough/consistent story as to why he is here.          Objective     Last Recorded Vitals  /72   Pulse (!) 101   Temp 37.5 °C (99.5 °F) (Temporal)   Resp 17   Wt 65.8 kg (145 lb)   SpO2 96%   Intake/Output last 3 Shifts:    Intake/Output Summary (Last 24 hours) at 12/30/2024 2043  Last data filed at 12/30/2024 1859  Gross per 24 hour   Intake 200 ml   Output --   Net 200 ml       Admission Weight  Weight: 65.8 kg (145 lb) (12/29/24 1159)    Daily Weight  12/29/24 : 65.8 kg (145 lb)    Image Results  Electrocardiogram, 12-lead  Sinus tachycardia    Results from last 7 days   Lab Units 12/30/24  0518   WBC AUTO x10*3/uL 9.2   HEMOGLOBIN g/dL 11.9*   HEMATOCRIT % 37.0*   PLATELETS AUTO x10*3/uL 154      Results from last 7 days   Lab Units 12/30/24  0518   SODIUM mmol/L 137   POTASSIUM mmol/L 4.1   CHLORIDE mmol/L 98   CO2 mmol/L 33*   BUN mg/dL 14   CREATININE mg/dL 1.05   GLUCOSE mg/dL 102*   CALCIUM mg/dL 8.2*      In the review of systems except for was mentioned in the history of present illness 10 system review is negative at this time    Physical Exam  Lungs are clear heart has  regular rate and rhythm no murmurs gallops or rubs there is no respiratory distress abdomen is soft and nontender positive bowel sounds no organomegaly skin is warm and dry  Relevant Results               Assessment/Plan                  Assessment & Plan  Acute pneumonia     Pneumonia  # Acute hypoxia  # Leukocytosis  # Tachycardia  # Lethargy     -Antibiotics initiated in ED, will continue ceftriaxone, doxycycline   -Initiate Solu-Medrol 40 mg every 8hrs   -DuoNeb scheduled, albuterol as needed   -Encourage incentive spirometry  -Sputum culture  -Mucinex twice daily  -Strep pneumo/Legionella urine ordered   -As needed oxygen >92%   -Regular diet   -admitted to inpatient with continuous pulse ox  -q4 vitals   -morning labs, trend WBC     Chronic Conditions   # Schizoaffective disorder  # Substance abuse on Suboxone                 Luigi Rayo DO

## 2024-12-31 NOTE — CARE PLAN
Problem: Pain - Adult  Goal: Verbalizes/displays adequate comfort level or baseline comfort level  Outcome: Progressing     Problem: Safety - Adult  Goal: Free from fall injury  Outcome: Progressing     Problem: Discharge Planning  Goal: Discharge to home or other facility with appropriate resources  Outcome: Progressing     Problem: Chronic Conditions and Co-morbidities  Goal: Patient's chronic conditions and co-morbidity symptoms are monitored and maintained or improved  Outcome: Progressing      [External ears normal] : external ears normal [Normal] : patient has a normal gait [Attention Intact] : attention intact [Able to redirect] : able to redirect [Fidgets] : fidgets [Well-behaved during visit] : well-behaved during visit [Cooperative when examined] : cooperative when examined [Appropriate eye contact] : appropriate eye contact [Smiles responsively] : smiles responsively [Quiet/calm] : quiet/calm [Positive mood] : positive mood [Answered questions appropriately] : answered questions appropriately [Responds to name] : responds to name [Able to follow one step commands] : able to follow one step commands [Social referencing noted] : social referencing noted [Joint attention noted] : joint attention noted [Easily Distracted] : not easily distracted [Difficulty shifting attention or transitioning] : no difficulty shifting attention or transitioning [Moves quickly from one activity to another] : does not move quickly from one activity to another [Needs frequent redirecting] : does not need frequent redirecting [Withholding] : no withholding [Oppositional] : not oppositional [Echolalia] : no echolalia [Negative mood] : no negative mood [Hypersensitive] : not hypersensitive [Difficult to engage in play] : not difficult to engage in play

## 2024-12-31 NOTE — PROGRESS NOTES
Jesse Rico is a 39 y.o. male on day 2 of admission presenting with Acute pneumonia.      Subjective   39-year-old male who presents to the ED with lethargy.  Patient has a past medical history of polysubstance use on Suboxone, schizoaffective disorder.  Patient is from [facility] and it was noted that patient had increasing mental decline with lethargy today.  Patient ended up rolling out of bed onto his bottom at 1 point today.  Patient seen in ED, he is currently pacing around but is able to answer questions appropriately, appears internally stimulated as he was talking a lot to himself while this provider was walking up.  Patient is alert and oriented x 4 at this time.  Patient denies any shortness of breath, cough, chest pain, urinary problems, or dizziness.  Patient does not seem to be able to provide a thorough/consistent story as to why he is here.           Objective     Last Recorded Vitals  /68 (Patient Position: Sitting)   Pulse 82   Temp 36.3 °C (97.3 °F) (Temporal)   Resp 20   Wt 65.8 kg (145 lb)   SpO2 98%   Intake/Output last 3 Shifts:    Intake/Output Summary (Last 24 hours) at 12/31/2024 1701  Last data filed at 12/31/2024 1003  Gross per 24 hour   Intake 250 ml   Output --   Net 250 ml       Admission Weight  Weight: 65.8 kg (145 lb) (12/29/24 1159)    Daily Weight  12/29/24 : 65.8 kg (145 lb)    Image Results  Electrocardiogram, 12-lead  Sinus tachycardia    Results from last 7 days   Lab Units 12/31/24  1002   WBC AUTO x10*3/uL 13.6*   HEMOGLOBIN g/dL 11.4*   HEMATOCRIT % 34.3*   PLATELETS AUTO x10*3/uL 144*      Results from last 7 days   Lab Units 12/31/24  1002   SODIUM mmol/L 134*   POTASSIUM mmol/L 4.2   CHLORIDE mmol/L 96*   CO2 mmol/L 30   BUN mg/dL 9   CREATININE mg/dL 0.77   GLUCOSE mg/dL 180*   CALCIUM mg/dL 8.6      No fever weakness, no vomiting or diarrhea    Physical Exam  Awake and alert lungs are diminished but clear heart has regular rate and rhythm  abdomen soft is not distended or firm  Relevant Results               Assessment/Plan                  Assessment & Plan  Acute pneumonia      Pneumonia  # Acute hypoxia  # Leukocytosis  # Tachycardia  # Lethargy     -Antibiotics initiated in ED, will continue ceftriaxone, doxycycline   -Initiate Solu-Medrol 40 mg every 8hrs   -DuoNeb scheduled, albuterol as needed   -Encourage incentive spirometry  -Sputum culture  -Mucinex twice daily  -Strep pneumo/Legionella urine ordered   -As needed oxygen >92%   -Regular diet   -admitted to inpatient with continuous pulse ox  -q4 vitals   -morning labs, trend WBC       Going to consult with pulmonary on him DVT prophylaxis and continue with treating his chronic conditions as well            Luigi Rayo, DO

## 2024-12-31 NOTE — PROGRESS NOTES
12/31/24 1106   Discharge Planning   Living Arrangements Other (Comment)   Support Systems Family members;Parent;/;Home care staff;Organized support group (Comment);Therapist   Expected Discharge Disposition Rehab   Does the patient need discharge transport arranged? Yes   RoundTrip coordination needed? Yes     Patient presented from [facility] where he is in a 60 day program which began 11/27 per his mother/legal guardian Cady.  Also per Cady, [facility] already agreed to accept patient back after discharge.  I called abel Simmons [facility] requesting a return call.  Care Coordination team following for assistance with discharge planning.  Toby JALLOH TCC

## 2025-01-01 PROCEDURE — 2500000002 HC RX 250 W HCPCS SELF ADMINISTERED DRUGS (ALT 637 FOR MEDICARE OP, ALT 636 FOR OP/ED)

## 2025-01-01 PROCEDURE — 2500000001 HC RX 250 WO HCPCS SELF ADMINISTERED DRUGS (ALT 637 FOR MEDICARE OP)

## 2025-01-01 PROCEDURE — 1100000001 HC PRIVATE ROOM DAILY

## 2025-01-01 PROCEDURE — 2500000004 HC RX 250 GENERAL PHARMACY W/ HCPCS (ALT 636 FOR OP/ED)

## 2025-01-01 RX ORDER — METHYLPREDNISOLONE 4 MG/1
TABLET ORAL
Qty: 21 TABLET | Refills: 0 | Status: SHIPPED | OUTPATIENT
Start: 2025-01-01 | End: 2025-01-03

## 2025-01-01 RX ORDER — DOXYCYCLINE HYCLATE 100 MG
100 TABLET ORAL EVERY 12 HOURS SCHEDULED
Status: DISCONTINUED | OUTPATIENT
Start: 2025-01-01 | End: 2025-01-02 | Stop reason: HOSPADM

## 2025-01-01 RX ORDER — GUAIFENESIN 600 MG/1
600 TABLET, EXTENDED RELEASE ORAL 2 TIMES DAILY
Qty: 60 TABLET | Refills: 0 | Status: SHIPPED | OUTPATIENT
Start: 2025-01-01 | End: 2025-01-03

## 2025-01-01 RX ORDER — DOXYCYCLINE 100 MG/1
100 CAPSULE ORAL EVERY 12 HOURS SCHEDULED
Qty: 20 CAPSULE | Refills: 0 | Status: SHIPPED | OUTPATIENT
Start: 2025-01-01 | End: 2025-01-11

## 2025-01-01 RX ADMIN — TRAZODONE HYDROCHLORIDE 150 MG: 50 TABLET ORAL at 20:27

## 2025-01-01 RX ADMIN — GABAPENTIN 200 MG: 100 CAPSULE ORAL at 14:14

## 2025-01-01 RX ADMIN — BENZTROPINE MESYLATE 1 MG: 1 TABLET ORAL at 20:30

## 2025-01-01 RX ADMIN — METHYLPREDNISOLONE SODIUM SUCCINATE 40 MG: 40 INJECTION, POWDER, FOR SOLUTION INTRAMUSCULAR; INTRAVENOUS at 14:15

## 2025-01-01 RX ADMIN — BUSPIRONE HYDROCHLORIDE 10 MG: 10 TABLET ORAL at 20:27

## 2025-01-01 RX ADMIN — CEFTRIAXONE SODIUM 2 G: 2 INJECTION, SOLUTION INTRAVENOUS at 10:59

## 2025-01-01 RX ADMIN — BUPRENORPHINE 2 MG: 2 TABLET SUBLINGUAL at 09:46

## 2025-01-01 RX ADMIN — OLANZAPINE 10 MG: 5 TABLET, FILM COATED ORAL at 20:28

## 2025-01-01 RX ADMIN — BENZTROPINE MESYLATE 1 MG: 1 TABLET ORAL at 09:45

## 2025-01-01 RX ADMIN — GUAIFENESIN 600 MG: 600 TABLET, EXTENDED RELEASE ORAL at 09:43

## 2025-01-01 RX ADMIN — GABAPENTIN 200 MG: 100 CAPSULE ORAL at 20:27

## 2025-01-01 RX ADMIN — BUPRENORPHINE 2 MG: 2 TABLET SUBLINGUAL at 20:33

## 2025-01-01 RX ADMIN — GUAIFENESIN 600 MG: 600 TABLET, EXTENDED RELEASE ORAL at 20:27

## 2025-01-01 RX ADMIN — CETIRIZINE HYDROCHLORIDE 10 MG: 10 TABLET, FILM COATED ORAL at 09:44

## 2025-01-01 RX ADMIN — METHYLPREDNISOLONE SODIUM SUCCINATE 40 MG: 40 INJECTION, POWDER, FOR SOLUTION INTRAMUSCULAR; INTRAVENOUS at 22:23

## 2025-01-01 RX ADMIN — METHYLPREDNISOLONE SODIUM SUCCINATE 40 MG: 40 INJECTION, POWDER, FOR SOLUTION INTRAMUSCULAR; INTRAVENOUS at 05:08

## 2025-01-01 RX ADMIN — BUSPIRONE HYDROCHLORIDE 10 MG: 10 TABLET ORAL at 14:14

## 2025-01-01 RX ADMIN — GABAPENTIN 200 MG: 100 CAPSULE ORAL at 09:44

## 2025-01-01 RX ADMIN — DOXYCYCLINE 100 MG: 100 INJECTION, POWDER, LYOPHILIZED, FOR SOLUTION INTRAVENOUS at 05:08

## 2025-01-01 RX ADMIN — DIVALPROEX SODIUM 1000 MG: 250 TABLET, FILM COATED, EXTENDED RELEASE ORAL at 20:27

## 2025-01-01 RX ADMIN — CALCIUM CARBONATE (ANTACID) CHEW TAB 500 MG 1500 MG: 500 CHEW TAB at 10:59

## 2025-01-01 RX ADMIN — DOXYCYCLINE 100 MG: 100 INJECTION, POWDER, LYOPHILIZED, FOR SOLUTION INTRAVENOUS at 16:11

## 2025-01-01 RX ADMIN — ARIPIPRAZOLE 5 MG: 5 TABLET ORAL at 09:44

## 2025-01-01 RX ADMIN — BUSPIRONE HYDROCHLORIDE 10 MG: 10 TABLET ORAL at 09:45

## 2025-01-01 ASSESSMENT — PAIN - FUNCTIONAL ASSESSMENT: PAIN_FUNCTIONAL_ASSESSMENT: 0-10

## 2025-01-01 ASSESSMENT — PAIN SCALES - GENERAL
PAINLEVEL_OUTOF10: 0 - NO PAIN

## 2025-01-01 NOTE — CARE PLAN
Problem: Pain - Adult  Goal: Verbalizes/displays adequate comfort level or baseline comfort level  Outcome: Progressing     Problem: Safety - Adult  Goal: Free from fall injury  Outcome: Progressing     Problem: Discharge Planning  Goal: Discharge to home or other facility with appropriate resources  Outcome: Progressing     Problem: Chronic Conditions and Co-morbidities  Goal: Patient's chronic conditions and co-morbidity symptoms are monitored and maintained or improved  Outcome: Progressing

## 2025-01-01 NOTE — CONSULTS
Pulmonary Critical Care note    Patient Name :Jesse Rico  Date of service : 01/01/25  MRN: [id number]  YOB: 1985      Interval History:    History of Present Illness:      39 y.o. male  on day  3 of admission presenting with Acute pneumonia.  Past medical history of polysubstance abuse on Suboxone, schizoaffective disorder, presented with acute shortness of breath and cough, he was noted to have right upper lobe lower lobe/right middle lobe infiltrate, patient denied any loss of consciousness, denied any nausea vomiting,  Past Medical/Surgical History:    has no past medical history on file.   has no past surgical history on file.   reports that he has been smoking cigarettes. He does not have any smokeless tobacco history on file. He reports current alcohol use of about 1.0 standard drink of alcohol per week. No history on file for drug use.  Family History:   No relevant family history has been documented for this patient.    Allergies:     Risperidone, Banana, Codeine, and Hydrocodone      Social history:   reports that he has been smoking cigarettes. He does not have any smokeless tobacco history on file. He reports current alcohol use of about 1.0 standard drink of alcohol per week.      Review of Systems:   General: denies weight gain, denies loss of appetite, fever, chills, night sweats.  HEENT: denies headaches, dizziness, head trauma, visual changes, eye pain, tinnitus, nosebleeds, hoarseness or throat pain    Respiratory: denies chest pain, dyspnea, cough and hemoptysis  Cardiovascular: denies orthopnea, paroxysmal nocturnal dyspnea, leg swelling, and previous heart attack.    Gastrointestinal: denies pain, nausea vomiting, diarrhea, constipation, melena or bleeding.  Genitourinary: denies hematuria, frequency, urgency or dysuria  Neurology: denies syncope, seizures, paralysis, paraesthesia   Endocrine: denies polyuria, polydipsia, skin or hair changes, and heat or cold  intolerance  Musculoskeletal: denies joint pain, swelling, arthritis or myalgia  Hematologic: denies bleeding, adenopathy and easy bruising  Skin: denies rashes and skin discoloration  Psychiatry: denies depression    Physical Exam:   Vital Signs:   Vitals:    01/01/25 1552   BP: 129/82   Pulse: 65   Resp:    Temp: 35.8 °C (96.4 °F)   SpO2: 94%     Vitals:    12/29/24 1159   Weight: 65.8 kg (145 lb)         Input/Output:    Intake/Output Summary (Last 24 hours) at 1/1/2025 1733  Last data filed at 1/1/2025 1711  Gross per 24 hour   Intake 250 ml   Output --   Net 250 ml       Oxygen requirements:    Ventilator Information:     Oxygen Therapy/Pulse Ox  Medical Gas Therapy: None (Room air)  SpO2: 94 %  Patient Activity During SpO2 Measurement: At rest  Temp: 35.8 °C (96.4 °F)        General appearance: Not in pain or distress, in no respiratory distress    HEENT: Atraumatic/normocephalic, EOMI, CLEMENTINE, pharynx clear, moist mucosa, redness of the uvula appreciated,   Neck: Supple, no jugular venous distension, lymphadenopathy, thyromegaly or carotid bruits  Chest: Rhonchi and bronchial sound right lower lobe  Cardiovascular: Normal S1, S2, regular rate and rhythm, no murmur, rub or gallop  Abdomen: Normal sounds present, soft, lax with no tenderness, no hepatosplenomegaly, and no masses  Extremities: No edema. Pulses are equally present.   Skin: intact, no rashes   Neurologic: Alert and oriented x 3, No focal deficit         Current Medications:   Scheduled medications  ARIPiprazole, 5 mg, oral, Daily  benztropine, 1 mg, oral, BID  buprenorphine, 2 mg, sublingual, BID  busPIRone, 10 mg, oral, TID  cefTRIAXone, 2 g, intravenous, q24h  cetirizine, 10 mg, oral, Daily  divalproex, 1,000 mg, oral, Nightly  doxycycline, 100 mg, intravenous, q12h  gabapentin, 200 mg, oral, TID  guaiFENesin, 600 mg, oral, BID  methylPREDNISolone sodium succinate (PF), 40 mg, intravenous, q8h  OLANZapine, 10 mg, oral, Nightly  traZODone, 150 mg,  oral, Nightly      Continuous medications     PRN medications  PRN medications: acetaminophen, albuterol, calcium carbonate, ibuprofen, melatonin, ondansetron, oxygen, polyethylene glycol      Investigations:  Labs, radiological imaging and cardiac work up were personally reviewed    Results from last 7 days   Lab Units 12/31/24  1002 12/30/24  0518 12/29/24  1259   SODIUM mmol/L 134* 137 135*   POTASSIUM mmol/L 4.2 4.1 4.7   CHLORIDE mmol/L 96* 98 98   CO2 mmol/L 30 33* 29   BUN mg/dL 9 14 11   CREATININE mg/dL 0.77 1.05 1.26   GLUCOSE mg/dL 180* 102* 103*   CALCIUM mg/dL 8.6 8.2* 8.8     Results from last 7 days   Lab Units 12/31/24  1002   WBC AUTO x10*3/uL 13.6*   HEMOGLOBIN g/dL 11.4*   HEMATOCRIT % 34.3*   PLATELETS AUTO x10*3/uL 144*         Electrocardiogram, 12-lead    Result Date: 12/30/2024  Sinus tachycardia    XR chest 2 views    Result Date: 12/29/2024  Interpreted By:  Gerhard Vega, STUDY: XR CHEST 2 VIEWS  12/29/2024 1:49 pm   INDICATION: Signs/Symptoms:cough   COMPARISON: 08/23/2024   ACCESSION NUMBER(S): [id number]   ORDERING CLINICIAN: NGA GABRIEL   TECHNIQUE: PA and lateral views of the chest were obtained.   FINDINGS: Dense airspace consolidation seen throughout right mid to lower lung concerning for pneumonia. No pleural effusion or pneumothorax is identified. The cardiac silhouette is within normal limits for size.       Right-sided airspace consolidation,. Clinical correlation continued follow-up until clearing is recommended.   MACRO: None.   Signed by: Gerhard Vega 12/29/2024 1:57 PM Dictation workstation:   [id number]      Assessment  Jesse Rico IS 39 y.o. male  on day  3 of admission presenting with Acute pneumonia. Actively being treated for the  following medical Problems:    Community-acquired pneumonia versus aspiration pneumonia    Recommendation:  Patient responded well to medical therapy with currently Rocephin, will need atypical coverage will switch to p.o. doxycycline to  avoid QT prolongation associated with the concurrent use of olanzapine with azithromycin if needed  May discharge from pulmonary standpoint  Recommend follow-up chest x-ray in 3 to 4 weeks  Nursing bedside swallow evaluation      Gisselle Lopez MD  Pulmonary critical care consultant  01/01/25  5:33 PM       STAFF PHYSICIAN NOTE OF PERSONAL INVOLVEMENT IN CARE  As the attending physician, I certify that I personally reviewed the patient's history and personally examined the patient to confirm the physical findings described above, and that I reviewed the relevant imaging studies and available reports.  I also discussed the differential diagnosis and all of the proposed management plans with the patient and individuals accompanying the patient to this visit.  They had the opportunity to ask questions about the proposed management plans and to have those questions answered.

## 2025-01-01 NOTE — DISCHARGE SUMMARY
Discharge Diagnosis  Acute pneumonia    Issues Requiring Follow-Up  Patient fully evaluated 01/01   for   Assessment & Plan  Acute pneumonia      Patient with significant clinical improvement noted, patient medically cleared for discharge today. Patient seen resting in bed with head of bed elevated, no s/s or c/o acute difficulties at this time. Vital signs for last 24 hours:  Temp:  [35.1 °C (95.2 °F)-36.5 °C (97.7 °F)] 35.8 °C (96.4 °F)  Heart Rate:  [65-88] 65  Resp:  [20] 20  BP: (120-133)/(61-82) 129/82 Medications and labs reviewed-   No results found for this or any previous visit (from the past 24 hours).   Patient recently received an antibiotic (last 12 hours)       Date/Time Action Medication Dose Rate    01/01/25 1611 New Bag    doxycycline (Vibramycin) 100 mg in dextrose 5%  mL 100 mg 100 mL/hr    01/01/25 1059 New Bag    cefTRIAXone (Rocephin) 2 g in dextrose (iso) IV 50 mL 2 g 100 mL/hr           No results found for the last 90 days.       Continue aggressive pulmonary hygiene and oral hygiene. Off loading as tolerated for skin integrity.  Plan discussed with interdisciplinary team, ok to discharge, will continue current and repeat labs in the AM if patient still hospitalized. Patient aware and agreeable to current plan, continue plan as above.     I spent 30 minutes on the date of the service which included preparing to see the patient, face-to-face patient care, completing clinical documentation, obtaining and/or reviewing separately obtained history, performing a medically appropriate examination, counseling and educating the patient/family/caregiver, ordering medications, tests, or procedures, communicating with other HCPs (not separately reported), independently interpreting results (not separately reported), communicating results to the patient/family/caregiver, and care coordination (not separately reported    Test Results Pending At Discharge  Pending Labs       Order Current Status     Blood Culture Preliminary result    Blood Culture Preliminary result            Hospital Course         Romero Narvaez MD   Physician  Internal Medicine     Progress Notes      Signed     Date of Service: 1/1/2025  4:25 PM     Signed       Expand All Collapse All    Jesse Rico is a 39 y.o. male on day 3 of admission presenting with Acute pneumonia.           Subjective  39-year-old male who presents to the ED with lethargy.  Patient has a past medical history of polysubstance use on Suboxone, schizoaffective disorder.  Patient is from [facility] and it was noted that patient had increasing mental decline with lethargy today.  Patient ended up rolling out of bed onto his bottom at 1 point today.  Patient seen in ED, he is currently pacing around but is able to answer questions appropriately, appears internally stimulated as he was talking a lot to himself while this provider was walking up.  Patient is alert and oriented x 4 at this time.  Patient denies any shortness of breath, cough, chest pain, urinary problems, or dizziness.  Patient does not seem to be able to provide a thorough/consistent story as to why he is here.                   Objective  Last Recorded Vitals  /82   Pulse 65   Temp 35.8 °C (96.4 °F)   Resp 20   Wt 65.8 kg (145 lb)   SpO2 94%   Intake/Output last 3 Shifts:     Intake/Output Summary (Last 24 hours) at 1/1/2025 1625  Last data filed at 1/1/2025 1303      Gross per 24 hour   Intake 250 ml   Output --   Net 250 ml         Admission Weight  Weight: 65.8 kg (145 lb) (12/29/24 1159)     Daily Weight  12/29/24 : 65.8 kg (145 lb)     Image Results  Electrocardiogram, 12-lead  Sinus tachycardia          Results from last 7 days   Lab Units 12/31/24  1002   WBC AUTO x10*3/uL 13.6*   HEMOGLOBIN g/dL 11.4*   HEMATOCRIT % 34.3*   PLATELETS AUTO x10*3/uL 144*           Results from last 7 days   Lab Units 12/31/24  1002   SODIUM mmol/L 134*   POTASSIUM mmol/L 4.2   CHLORIDE  mmol/L 96*   CO2 mmol/L 30   BUN mg/dL 9   CREATININE mg/dL 0.77   GLUCOSE mg/dL 180*   CALCIUM mg/dL 8.6      No fever weakness, no vomiting or diarrhea     Physical Exam  Awake and alert lungs are diminished but clear heart has regular rate and rhythm abdomen soft is not distended or firm  Relevant Results                       Assessment/Plan                       Assessment & Plan  Acute pneumonia               Signed        Expand All Collapse All    History Of Present Illness  Jesse Rico is a 39-year-old male who presents to the ED with lethargy.  Patient has a past medical history of polysubstance use on Suboxone, schizoaffective disorder.  Patient is from [facility] and it was noted that patient had increasing mental decline with lethargy today.  Patient ended up rolling out of bed onto his bottom at 1 point today.  Patient seen in ED, he is currently pacing around but is able to answer questions appropriately, appears internally stimulated as he was talking a lot to himself while this provider was walking up.  Patient is alert and oriented x 4 at this time.  Patient denies any shortness of breath, cough, chest pain, urinary problems, or dizziness.  Patient does not seem to be able to provide a thorough/consistent story as to why he is here.     ED Course      Hemodynamically Stable.    Vitals: Temp. 38.5, , Resp.  16, /64, Pulse ox 86% on RA now 94% NC     Labs: Glucose 103, Na+ 135, K+ 4.7, Bun 11, Creat.  1.26, GFR 75, Ca 8.8, Albumin 4.2, Alk Phos. 49, ALT 35, AST 39, Lactate 1.0, WBC 14.8,  Hgb.  13.7 Hct.  41.0, flu/COVID-negative, tox screen negative  Medications: Tylenol, ceftriaxone, doxycycline, Motrin,  Imaging: interpreted by radiologist.  Chest x-ray: Dense airspace consolidation seen throughout right mid to lower lung concerning for pneumonia.    EKG: Not available for my review.             Past Medical History  As noted above.     Surgical History  Patient is  noncontributory     Social History  Nuys any current smoking tobacco/marijuana/illicit drug use as he is at a rehab center currently.  Patient reports he plans to transfer from the rehab center to a sober living house.     Family History  Patient is noncontributory     Allergies  Lactose, Risperidone, Banana, Codeine, and Hydrocodone     Review of Systems      10 point ROS systems completed and is negative except for what is stated in HPI.      Physical Exam  Constitutional:       General: He is awake. He is not in acute distress.     Appearance: Normal appearance. He is well-developed. He is not toxic-appearing.   HENT:      Head: Normocephalic and atraumatic.      Nose: Nose normal. No rhinorrhea.      Mouth/Throat:      Mouth: Mucous membranes are moist.   Eyes:      General:         Right eye: No discharge.         Left eye: No discharge.      Conjunctiva/sclera: Conjunctivae normal.      Pupils: Pupils are equal, round, and reactive to light.   Cardiovascular:      Rate and Rhythm: Normal rate and regular rhythm.      Pulses: Normal pulses.   Pulmonary:      Effort: Pulmonary effort is normal. No respiratory distress.      Breath sounds: Examination of the right-middle field reveals rhonchi. Examination of the right-lower field reveals rhonchi. Rhonchi present. No wheezing.   Abdominal:      General: Bowel sounds are normal. There is no distension.      Palpations: Abdomen is soft.      Tenderness: There is no abdominal tenderness. There is no guarding.   Musculoskeletal:         General: Normal range of motion.      Cervical back: Normal range of motion and neck supple.      Right lower leg: No edema.      Left lower leg: No edema.   Skin:     General: Skin is warm and dry.   Neurological:      General: No focal deficit present.      Mental Status: He is alert and oriented to person, place, and time. Mental status is at baseline.      Motor: No weakness.   Psychiatric:         Attention and Perception: He is  "inattentive.         Mood and Affect: Mood normal.         Speech: Speech normal.         Behavior: Behavior is hyperactive.      Comments: And appears internally stimulated as he was having a conversation with himself.  Pacing back-and-forth in the hallway.            Last Recorded Vitals  Blood pressure 129/78, pulse (!) 107, temperature (!) 38.5 °C (101.3 °F), temperature source Temporal, resp. rate 18, height 1.778 m (5' 10\"), weight 65.8 kg (145 lb), SpO2 96%.     Relevant Results            Results for orders placed or performed during the hospital encounter of 12/29/24 (from the past 24 hours)   Drug Screen, Urine   Result Value Ref Range     Amphetamine Screen, Urine Presumptive Negative Presumptive Negative     Barbiturate Screen, Urine Presumptive Negative Presumptive Negative     Benzodiazepines Screen, Urine Presumptive Negative Presumptive Negative     Cannabinoid Screen, Urine Presumptive Negative Presumptive Negative     Cocaine Metabolite Screen, Urine Presumptive Negative Presumptive Negative     Fentanyl Screen, Urine Presumptive Negative Presumptive Negative     Opiate Screen, Urine Presumptive Negative Presumptive Negative     Oxycodone Screen, Urine Presumptive Negative Presumptive Negative     PCP Screen, Urine Presumptive Negative Presumptive Negative     Methadone Screen, Urine Presumptive Negative Presumptive Negative   CBC and Auto Differential   Result Value Ref Range     WBC 14.8 (H) 4.4 - 11.3 x10*3/uL     nRBC 0.0 0.0 - 0.0 /100 WBCs     RBC 4.75 4.50 - 5.90 x10*6/uL     Hemoglobin 13.7 13.5 - 17.5 g/dL     Hematocrit 41.0 41.0 - 52.0 %     MCV 86 80 - 100 fL     MCH 28.8 26.0 - 34.0 pg     MCHC 33.4 32.0 - 36.0 g/dL     RDW 12.2 11.5 - 14.5 %     Platelets 196 150 - 450 x10*3/uL     Neutrophils % 81.1 40.0 - 80.0 %     Immature Granulocytes %, Automated 0.4 0.0 - 0.9 %     Lymphocytes % 8.2 13.0 - 44.0 %     Monocytes % 10.0 2.0 - 10.0 %     Eosinophils % 0.1 0.0 - 6.0 %     Basophils " % 0.2 0.0 - 2.0 %     Neutrophils Absolute 12.02 (H) 1.20 - 7.70 x10*3/uL     Immature Granulocytes Absolute, Automated 0.06 0.00 - 0.70 x10*3/uL     Lymphocytes Absolute 1.22 1.20 - 4.80 x10*3/uL     Monocytes Absolute 1.48 (H) 0.10 - 1.00 x10*3/uL     Eosinophils Absolute 0.01 0.00 - 0.70 x10*3/uL     Basophils Absolute 0.03 0.00 - 0.10 x10*3/uL   Comprehensive Metabolic Panel   Result Value Ref Range     Glucose 103 (H) 74 - 99 mg/dL     Sodium 135 (L) 136 - 145 mmol/L     Potassium 4.7 3.5 - 5.3 mmol/L     Chloride 98 98 - 107 mmol/L     Bicarbonate 29 21 - 32 mmol/L     Anion Gap 13 10 - 20 mmol/L     Urea Nitrogen 11 6 - 23 mg/dL     Creatinine 1.26 0.50 - 1.30 mg/dL     eGFR 75 >60 mL/min/1.73m*2     Calcium 8.8 8.6 - 10.3 mg/dL     Albumin 4.2 3.4 - 5.0 g/dL     Alkaline Phosphatase 49 33 - 120 U/L     Total Protein 6.7 6.4 - 8.2 g/dL     AST 39 9 - 39 U/L     Bilirubin, Total 0.8 0.0 - 1.2 mg/dL     ALT 35 10 - 52 U/L   Acute Toxicology Panel, Blood   Result Value Ref Range     Acetaminophen <10.0 10.0 - 30.0 ug/mL     Salicylate  <3 4 - 20 mg/dL     Alcohol 11 (H) <=10 mg/dL   Sars-CoV-2 PCR   Result Value Ref Range     Coronavirus 2019, PCR Not Detected Not Detected   Influenza A, and B PCR   Result Value Ref Range     Flu A Result Not Detected Not Detected     Flu B Result Not Detected Not Detected   Lactate   Result Value Ref Range     Lactate 1.0 0.4 - 2.0 mmol/L      XR chest 2 views     Result Date: 12/29/2024  Interpreted By:  Gerhard Vega, STUDY: XR CHEST 2 VIEWS  12/29/2024 1:49 pm   INDICATION: Signs/Symptoms:cough   COMPARISON: 08/23/2024   ACCESSION NUMBER(S): [id number]   ORDERING CLINICIAN: NGA GABRIEL   TECHNIQUE: PA and lateral views of the chest were obtained.   FINDINGS: Dense airspace consolidation seen throughout right mid to lower lung concerning for pneumonia. No pleural effusion or pneumothorax is identified. The cardiac silhouette is within normal limits for size.         Right-sided airspace consolidation,. Clinical correlation continued follow-up until clearing is recommended.   MACRO: None.   Signed by: Gerhard Vega 12/29/2024 1:57 PM Dictation workstation:   [id number]            Assessment/Plan     Assessment & Plan  Acute pneumonia        Patient arrived to ED today after having mental decline while at a rehab center.  Patient rolled out of his bed today on his bottom and was previously confused at facility.  Patient found to have pneumonia in right lung on chest x-ray.  Patient with a fever on arrival.  Patient denied any shortness of breath or cough but did require oxygen as he was 86% on room air.  Antibiotics continued ceftriaxone/doxycycline.  Solu-Medrol initiated, will continue to monitor oxygen.     [facility] to [facility] further medical management.      # Pneumonia  # Acute hypoxia  # Leukocytosis  # Tachycardia  # Lethargy     -Antibiotics initiated in ED, will continue ceftriaxone, doxycycline   -Initiate Solu-Medrol 40 mg every 8hrs   -DuoNeb scheduled, albuterol as needed   -Encourage incentive spirometry  -Sputum culture  -Mucinex twice daily  -Strep pneumo/Legionella urine ordered   -As needed oxygen >92%   -Regular diet   -admitted to inpatient with continuous pulse ox  -q4 vitals   -morning labs, trend WBC     Chronic Conditions   # Schizoaffective disorder  # Substance abuse on Suboxone     Continue home medications as ordered      Full Code      #DVT Prophylaxis   Scd's as tolerated   DVT Prophylaxis Heparin                                  Pneumonia  # Acute hypoxia  # Leukocytosis  # Tachycardia  # Lethargy     -Antibiotics initiated in ED, will continue ceftriaxone, doxycycline   -Initiate Solu-Medrol 40 mg every 8hrs   -DuoNeb scheduled, albuterol as needed   -Encourage incentive spirometry  -Sputum culture  -Mucinex twice daily  -Strep pneumo/Legionella urine ordered   -As needed oxygen >92%   -Regular diet   -admitted to  inpatient with continuous pulse ox  -q4 vitals   -morning labs, trend WBC        Going to consult with pulmonary on him DVT prophylaxis and continue with treating his chronic conditions as well     Patient fully evaluated  01/01  for    Problem List Items Addressed This Visit         * (Principal) Acute pneumonia - Primary     Relevant Medications     doxycycline (Vibramycin) 100 mg capsule     amoxicillin-pot clavulanate (Augmentin) 875-125 mg tablet      Other Visit Diagnoses         Encounter for psychological evaluation         Hypoxia                 Patient seen resting in bed with head of bed elevated, no s/s or c/o acute difficulties at this time.  Vital signs for last 24 hours Temp:  [35.1 °C (95.2 °F)-36.5 °C (97.7 °F)] 35.8 °C (96.4 °F)  Heart Rate:  [65-88] 65  Resp:  [20] 20  BP: (120-133)/(61-82) 129/82    I/O this shift:  In: 50 [IV Piggyback:50]  Out: -   Patient still requiring frequent cardiac and SPO2 monitoring. Continue aggressive pulmonary hygiene and oral hygiene. Off loading as tolerated for skin integrity. Medications and labs reviewed- No results found for this or any previous visit (from the past 24 hours).   Patient recently received an antibiotic (last 12 hours)         Date/Time Action Medication Dose Rate     01/01/25 1611 New Bag    doxycycline (Vibramycin) 100 mg in dextrose 5%  mL 100 mg 100 mL/hr     01/01/25 1059 New Bag    cefTRIAXone (Rocephin) 2 g in dextrose (iso) IV 50 mL 2 g 100 mL/hr     01/01/25 0508 New Bag    doxycycline (Vibramycin) 100 mg in dextrose 5%  mL 100 mg 100 mL/hr             Plan discussed with interdisciplinary team, pulmonary consulted, appreciate input. Incentive spirometer ordered. Patient more animated today, denies distress. No acute events overnight per staff. Will continue current and repeat labs in the AM.      Discharge planning discussed with patient and care team. Therapy evaluations ordered.  Patient presented from [facility] where he is in a 60 day program which began 11/27 per his mother/legal guardian Cady. Also per Cady, [facility] already [facility] patient back after discharge.  I called [facility] requesting a return call.  Care Coordination team following for assistance with discharge planning. Patient aware and agreeable to current plan, continue plan as above.      I spent a total of 50 minutes on the date of the service which included preparing to see the patient, face-to-face patient care, completing clinical documentation, obtaining and/or reviewing separately obtained history, performing a medically appropriate examination, counseling and educating the patient/family/caregiver, ordering medications, tests, or procedures, communicating with other HCPs (not separately reported), independently interpreting results (not separately reported), communicating results to the patient/family/caregiver, and care coordination (not separately reported).   Sofiya Mejia                      Revision History         Pertinent Physical Exam At Time of Discharge  Physical Exam    Home Medications     Medication List      START taking these medications     amoxicillin-pot clavulanate 875-125 mg tablet; Commonly known as:   Augmentin; Take 1 tablet by mouth every 12 hours for 10 days.   * doxycycline 100 mg capsule; Commonly known as: Vibramycin; Take 1   capsule (100 mg) by mouth 2 times a day for 10 days. Take with at least 8   ounces (large glass) of water, do not lie down for 30 minutes after   * doxycycline 100 mg capsule; Commonly known as: Vibramycin; Take 1   capsule (100 mg) by mouth every 12 hours for 10 days. Take with a full   glass of water and do not lie down for at least 30 minutes after.   guaiFENesin 600 mg 12 hr tablet; Commonly known as: Mucinex; Take 1   tablet (600 mg) by mouth 2 times a day. Do not crush, chew, or split.   methylPREDNISolone 4 mg tablets; Commonly known as: Medrol  Dospak; Take   as directed on package.  * This list has 2 medication(s) that are the same as other medications   prescribed for you. Read the directions carefully, and ask your doctor or   other care provider to review them with you.     CONTINUE taking these medications     acetaminophen 325 mg tablet; Commonly known as: Tylenol   ARIPiprazole 5 mg tablet; Commonly known as: Abilify   benztropine 1 mg tablet; Commonly known as: Cogentin   buprenorphine-naloxone 2-0.5 mg SL tablet; Commonly known as: Suboxone   busPIRone 10 mg tablet; Commonly known as: Buspar   divalproex 500 mg 24 hr tablet; Commonly known as: Depakote ER   gabapentin 100 mg capsule; Commonly known as: Neurontin   haloperidol decanoate 100 mg/mL injection; Commonly known as: Haldol   Decanoate   ibuprofen 600 mg tablet   loratadine 10 mg tablet; Commonly known as: Claritin   * OLANZapine 10 mg tablet; Commonly known as: ZyPREXA   * OLANZapine 20 mg tablet; Commonly known as: ZyPREXA   Tab-A-John 400 mcg tablet; Generic drug: multivitamin   traZODone 150 mg tablet; Commonly known as: Desyrel   Tums 300 mg (750 mg) chewable tablet; Generic drug: calcium carbonate  * This list has 2 medication(s) that are the same as other medications   prescribed for you. Read the directions carefully, and ask your doctor or   other care provider to review them with you.       Outpatient Follow-Up  No future appointments.    Sofiya Mejia

## 2025-01-01 NOTE — PROGRESS NOTES
Jesse Rico is a 39 y.o. male on day 3 of admission presenting with Acute pneumonia.      Subjective   39-year-old male who presents to the ED with lethargy.  Patient has a past medical history of polysubstance use on Suboxone, schizoaffective disorder.  Patient is from [facility] and it was noted that patient had increasing mental decline with lethargy today.  Patient ended up rolling out of bed onto his bottom at 1 point today.  Patient seen in ED, he is currently pacing around but is able to answer questions appropriately, appears internally stimulated as he was talking a lot to himself while this provider was walking up.  Patient is alert and oriented x 4 at this time.  Patient denies any shortness of breath, cough, chest pain, urinary problems, or dizziness.  Patient does not seem to be able to provide a thorough/consistent story as to why he is here.           Objective     Last Recorded Vitals  /82   Pulse 65   Temp 35.8 °C (96.4 °F)   Resp 20   Wt 65.8 kg (145 lb)   SpO2 94%   Intake/Output last 3 Shifts:    Intake/Output Summary (Last 24 hours) at 1/1/2025 1625  Last data filed at 1/1/2025 1303  Gross per 24 hour   Intake 250 ml   Output --   Net 250 ml       Admission Weight  Weight: 65.8 kg (145 lb) (12/29/24 1159)    Daily Weight  12/29/24 : 65.8 kg (145 lb)    Image Results  Electrocardiogram, 12-lead  Sinus tachycardia    Results from last 7 days   Lab Units 12/31/24  1002   WBC AUTO x10*3/uL 13.6*   HEMOGLOBIN g/dL 11.4*   HEMATOCRIT % 34.3*   PLATELETS AUTO x10*3/uL 144*      Results from last 7 days   Lab Units 12/31/24  1002   SODIUM mmol/L 134*   POTASSIUM mmol/L 4.2   CHLORIDE mmol/L 96*   CO2 mmol/L 30   BUN mg/dL 9   CREATININE mg/dL 0.77   GLUCOSE mg/dL 180*   CALCIUM mg/dL 8.6      No fever weakness, no vomiting or diarrhea    Physical Exam  Awake and alert lungs are diminished but clear heart has regular rate and rhythm abdomen soft is not distended or  firm  Relevant Results               Assessment/Plan                  Assessment & Plan  Acute pneumonia            Signed       Expand All Collapse All    History Of Present Illness  Jesse Rico is a 39-year-old male who presents to the ED with lethargy.  Patient has a past medical history of polysubstance use on Suboxone, schizoaffective disorder.  [facility] and it was noted that patient had increasing mental decline with lethargy today.  Patient ended up rolling out of bed onto his bottom at 1 point today.  Patient seen in ED, he is currently pacing around but is able to answer questions appropriately, appears internally stimulated as he was talking a lot to himself while this provider was walking up.  Patient is alert and oriented x 4 at this time.  Patient denies any shortness of breath, cough, chest pain, urinary problems, or dizziness.  Patient does not seem to be able to provide a thorough/consistent story as to why he is here.     ED Course      Hemodynamically Stable.    Vitals: Temp. 38.5, , Resp.  16, /64, Pulse ox 86% on RA now 94% NC     Labs: Glucose 103, Na+ 135, K+ 4.7, Bun 11, Creat.  1.26, GFR 75, Ca 8.8, Albumin 4.2, Alk Phos. 49, ALT 35, AST 39, Lactate 1.0, WBC 14.8,  Hgb.  13.7 Hct.  41.0, flu/COVID-negative, tox screen negative  Medications: Tylenol, ceftriaxone, doxycycline, Motrin,  Imaging: interpreted by radiologist.  Chest x-ray: Dense airspace consolidation seen throughout right mid to lower lung concerning for pneumonia.    EKG: Not available for my review.             Past Medical History  As noted above.     Surgical History  Patient is noncontributory     Social History  Nuys any current smoking tobacco/marijuana/illicit drug use as he is at a rehab center currently.  Patient reports he plans to transfer from the rehab center to a sober living house.     Family History  Patient is noncontributory     Allergies  Lactose, Risperidone,  Banana, Codeine, and Hydrocodone     Review of Systems      10 point ROS systems completed and is negative except for what is stated in HPI.      Physical Exam  Constitutional:       General: He is awake. He is not in acute distress.     Appearance: Normal appearance. He is well-developed. He is not toxic-appearing.   HENT:      Head: Normocephalic and atraumatic.      Nose: Nose normal. No rhinorrhea.      Mouth/Throat:      Mouth: Mucous membranes are moist.   Eyes:      General:         Right eye: No discharge.         Left eye: No discharge.      Conjunctiva/sclera: Conjunctivae normal.      Pupils: Pupils are equal, round, and reactive to light.   Cardiovascular:      Rate and Rhythm: Normal rate and regular rhythm.      Pulses: Normal pulses.   Pulmonary:      Effort: Pulmonary effort is normal. No respiratory distress.      Breath sounds: Examination of the right-middle field reveals rhonchi. Examination of the right-lower field reveals rhonchi. Rhonchi present. No wheezing.   Abdominal:      General: Bowel sounds are normal. There is no distension.      Palpations: Abdomen is soft.      Tenderness: There is no abdominal tenderness. There is no guarding.   Musculoskeletal:         General: Normal range of motion.      Cervical back: Normal range of motion and neck supple.      Right lower leg: No edema.      Left lower leg: No edema.   Skin:     General: Skin is warm and dry.   Neurological:      General: No focal deficit present.      Mental Status: He is alert and oriented to person, place, and time. Mental status is at baseline.      Motor: No weakness.   Psychiatric:         Attention and Perception: He is inattentive.         Mood and Affect: Mood normal.         Speech: Speech normal.         Behavior: Behavior is hyperactive.      Comments: And appears internally stimulated as he was having a conversation with himself.  Pacing back-and-forth in the hallway.            Last Recorded Vitals  Blood  "pressure 129/78, pulse (!) 107, temperature (!) 38.5 °C (101.3 °F), temperature source Temporal, resp. rate 18, height 1.778 m (5' 10\"), weight 65.8 kg (145 lb), SpO2 96%.     Relevant Results        Results for orders placed or performed during the hospital encounter of 12/29/24 (from the past 24 hours)   Drug Screen, Urine   Result Value Ref Range     Amphetamine Screen, Urine Presumptive Negative Presumptive Negative     Barbiturate Screen, Urine Presumptive Negative Presumptive Negative     Benzodiazepines Screen, Urine Presumptive Negative Presumptive Negative     Cannabinoid Screen, Urine Presumptive Negative Presumptive Negative     Cocaine Metabolite Screen, Urine Presumptive Negative Presumptive Negative     Fentanyl Screen, Urine Presumptive Negative Presumptive Negative     Opiate Screen, Urine Presumptive Negative Presumptive Negative     Oxycodone Screen, Urine Presumptive Negative Presumptive Negative     PCP Screen, Urine Presumptive Negative Presumptive Negative     Methadone Screen, Urine Presumptive Negative Presumptive Negative   CBC and Auto Differential   Result Value Ref Range     WBC 14.8 (H) 4.4 - 11.3 x10*3/uL     nRBC 0.0 0.0 - 0.0 /100 WBCs     RBC 4.75 4.50 - 5.90 x10*6/uL     Hemoglobin 13.7 13.5 - 17.5 g/dL     Hematocrit 41.0 41.0 - 52.0 %     MCV 86 80 - 100 fL     MCH 28.8 26.0 - 34.0 pg     MCHC 33.4 32.0 - 36.0 g/dL     RDW 12.2 11.5 - 14.5 %     Platelets 196 150 - 450 x10*3/uL     Neutrophils % 81.1 40.0 - 80.0 %     Immature Granulocytes %, Automated 0.4 0.0 - 0.9 %     Lymphocytes % 8.2 13.0 - 44.0 %     Monocytes % 10.0 2.0 - 10.0 %     Eosinophils % 0.1 0.0 - 6.0 %     Basophils % 0.2 0.0 - 2.0 %     Neutrophils Absolute 12.02 (H) 1.20 - 7.70 x10*3/uL     Immature Granulocytes Absolute, Automated 0.06 0.00 - 0.70 x10*3/uL     Lymphocytes Absolute 1.22 1.20 - 4.80 x10*3/uL     Monocytes Absolute 1.48 (H) 0.10 - 1.00 x10*3/uL     Eosinophils Absolute 0.01 0.00 - 0.70 x10*3/uL "     Basophils Absolute 0.03 0.00 - 0.10 x10*3/uL   Comprehensive Metabolic Panel   Result Value Ref Range     Glucose 103 (H) 74 - 99 mg/dL     Sodium 135 (L) 136 - 145 mmol/L     Potassium 4.7 3.5 - 5.3 mmol/L     Chloride 98 98 - 107 mmol/L     Bicarbonate 29 21 - 32 mmol/L     Anion Gap 13 10 - 20 mmol/L     Urea Nitrogen 11 6 - 23 mg/dL     Creatinine 1.26 0.50 - 1.30 mg/dL     eGFR 75 >60 mL/min/1.73m*2     Calcium 8.8 8.6 - 10.3 mg/dL     Albumin 4.2 3.4 - 5.0 g/dL     Alkaline Phosphatase 49 33 - 120 U/L     Total Protein 6.7 6.4 - 8.2 g/dL     AST 39 9 - 39 U/L     Bilirubin, Total 0.8 0.0 - 1.2 mg/dL     ALT 35 10 - 52 U/L   Acute Toxicology Panel, Blood   Result Value Ref Range     Acetaminophen <10.0 10.0 - 30.0 ug/mL     Salicylate  <3 4 - 20 mg/dL     Alcohol 11 (H) <=10 mg/dL   Sars-CoV-2 PCR   Result Value Ref Range     Coronavirus 2019, PCR Not Detected Not Detected   Influenza A, and B PCR   Result Value Ref Range     Flu A Result Not Detected Not Detected     Flu B Result Not Detected Not Detected   Lactate   Result Value Ref Range     Lactate 1.0 0.4 - 2.0 mmol/L      XR chest 2 views     Result Date: 12/29/2024  Interpreted By:  Gerhard Vega, STUDY: XR CHEST 2 VIEWS  12/29/2024 1:49 pm   INDICATION: Signs/Symptoms:cough   COMPARISON: 08/23/2024   ACCESSION NUMBER(S): [id number]   ORDERING CLINICIAN: NGA GABRIEL   TECHNIQUE: PA and lateral views of the chest were obtained.   FINDINGS: Dense airspace consolidation seen throughout right mid to lower lung concerning for pneumonia. No pleural effusion or pneumothorax is identified. The cardiac silhouette is within normal limits for size.        Right-sided airspace consolidation,. Clinical correlation continued follow-up until clearing is recommended.   MACRO: None.   Signed by: Gerhard Vega 12/29/2024 1:57 PM Dictation workstation:   [id number]            Assessment/Plan     Assessment & Plan  Acute pneumonia        Patient arrived to ED today  after having mental decline while at a rehab center.  Patient rolled out of his bed today on his bottom and was previously confused at facility.  Patient found to have pneumonia in right lung on chest x-ray.  Patient with a fever on arrival.  Patient denied any shortness of breath or cough but did require oxygen as he was 86% on room air.  Antibiotics continued ceftriaxone/doxycycline.  Solu-Medrol initiated, will continue to monitor oxygen.     [facility] admitted to [facility] for further medical management.      # Pneumonia  # Acute hypoxia  # Leukocytosis  # Tachycardia  # Lethargy     -Antibiotics initiated in ED, will continue ceftriaxone, doxycycline   -Initiate Solu-Medrol 40 mg every 8hrs   -DuoNeb scheduled, albuterol as needed   -Encourage incentive spirometry  -Sputum culture  -Mucinex twice daily  -Strep pneumo/Legionella urine ordered   -As needed oxygen >92%   -Regular diet   -admitted to inpatient with continuous pulse ox  -q4 vitals   -morning labs, trend WBC     Chronic Conditions   # Schizoaffective disorder  # Substance abuse on Suboxone     Continue home medications as ordered      Full Code      #DVT Prophylaxis   Scd's as tolerated   DVT Prophylaxis Heparin                                Pneumonia  # Acute hypoxia  # Leukocytosis  # Tachycardia  # Lethargy     -Antibiotics initiated in ED, will continue ceftriaxone, doxycycline   -Initiate Solu-Medrol 40 mg every 8hrs   -DuoNeb scheduled, albuterol as needed   -Encourage incentive spirometry  -Sputum culture  -Mucinex twice daily  -Strep pneumo/Legionella urine ordered   -As needed oxygen >92%   -Regular diet   -admitted to inpatient with continuous pulse ox  -q4 vitals   -morning labs, trend WBC       Going to consult with pulmonary on him DVT prophylaxis and continue with treating his chronic conditions as well          Patient fully evaluated  01/01  for    Problem List Items Addressed This Visit       * (Principal) Acute pneumonia -  Primary    Relevant Medications    doxycycline (Vibramycin) 100 mg capsule    amoxicillin-pot clavulanate (Augmentin) 875-125 mg tablet     Other Visit Diagnoses       Encounter for psychological evaluation        Hypoxia              Patient seen resting in bed with head of bed elevated, no s/s or c/o acute difficulties at this time.  Vital signs for last 24 hours Temp:  [35.1 °C (95.2 °F)-36.5 °C (97.7 °F)] 35.8 °C (96.4 °F)  Heart Rate:  [65-88] 65  Resp:  [20] 20  BP: (120-133)/(61-82) 129/82    I/O this shift:  In: 50 [IV Piggyback:50]  Out: -   Patient still requiring frequent cardiac and SPO2 monitoring. Continue aggressive pulmonary hygiene and oral hygiene. Off loading as tolerated for skin integrity. Medications and labs reviewed- No results found for this or any previous visit (from the past 24 hours).   Patient recently received an antibiotic (last 12 hours)       Date/Time Action Medication Dose Rate    01/01/25 1611 New Bag    doxycycline (Vibramycin) 100 mg in dextrose 5%  mL 100 mg 100 mL/hr    01/01/25 1059 New Bag    cefTRIAXone (Rocephin) 2 g in dextrose (iso) IV 50 mL 2 g 100 mL/hr    01/01/25 0508 New Bag    doxycycline (Vibramycin) 100 mg in dextrose 5%  mL 100 mg 100 mL/hr           Plan discussed with interdisciplinary team, pulmonary consulted, appreciate input. Incentive spirometer ordered. Patient more animated today, denies distress. No acute events overnight per staff. Will continue current and repeat labs in the AM.     Discharge planning discussed with patient and care team. Therapy evaluations ordered.  Patient presented from [facility] where he is in a 60 day program which began 11/27 per his mother/legal guardian Cady. Also per Cady, [facility] has already agreed to accept patient back after discharge.  I called [facility], left [facility] requesting a return call.  Care Coordination team following for assistance with discharge planning. Patient aware  and agreeable to current plan, continue plan as above.     I spent a total of 50 minutes on the date of the service which included preparing to see the patient, face-to-face patient care, completing clinical documentation, obtaining and/or reviewing separately obtained history, performing a medically appropriate examination, counseling and educating the patient/family/caregiver, ordering medications, tests, or procedures, communicating with other HCPs (not separately reported), independently interpreting results (not separately reported), communicating results to the patient/family/caregiver, and care coordination (not separately reported).   Sofiya Mejia

## 2025-01-02 VITALS
RESPIRATION RATE: 20 BRPM | DIASTOLIC BLOOD PRESSURE: 61 MMHG | HEART RATE: 54 BPM | SYSTOLIC BLOOD PRESSURE: 124 MMHG | TEMPERATURE: 96.8 F | OXYGEN SATURATION: 98 % | HEIGHT: 70 IN | WEIGHT: 145 LBS | BODY MASS INDEX: 20.76 KG/M2

## 2025-01-02 PROCEDURE — 2500000004 HC RX 250 GENERAL PHARMACY W/ HCPCS (ALT 636 FOR OP/ED)

## 2025-01-02 PROCEDURE — 2500000001 HC RX 250 WO HCPCS SELF ADMINISTERED DRUGS (ALT 637 FOR MEDICARE OP): Performed by: INTERNAL MEDICINE

## 2025-01-02 PROCEDURE — 2500000001 HC RX 250 WO HCPCS SELF ADMINISTERED DRUGS (ALT 637 FOR MEDICARE OP)

## 2025-01-02 PROCEDURE — 2500000002 HC RX 250 W HCPCS SELF ADMINISTERED DRUGS (ALT 637 FOR MEDICARE OP, ALT 636 FOR OP/ED)

## 2025-01-02 RX ADMIN — CETIRIZINE HYDROCHLORIDE 10 MG: 10 TABLET, FILM COATED ORAL at 08:12

## 2025-01-02 RX ADMIN — DOXYCYCLINE HYCLATE 100 MG: 100 TABLET, COATED ORAL at 05:54

## 2025-01-02 RX ADMIN — METHYLPREDNISOLONE SODIUM SUCCINATE 40 MG: 40 INJECTION, POWDER, FOR SOLUTION INTRAMUSCULAR; INTRAVENOUS at 05:54

## 2025-01-02 RX ADMIN — BUPRENORPHINE 2 MG: 2 TABLET SUBLINGUAL at 08:12

## 2025-01-02 RX ADMIN — BUSPIRONE HYDROCHLORIDE 10 MG: 10 TABLET ORAL at 08:12

## 2025-01-02 RX ADMIN — BENZTROPINE MESYLATE 1 MG: 1 TABLET ORAL at 08:12

## 2025-01-02 RX ADMIN — CALCIUM CARBONATE (ANTACID) CHEW TAB 500 MG 1500 MG: 500 CHEW TAB at 09:51

## 2025-01-02 RX ADMIN — ARIPIPRAZOLE 5 MG: 5 TABLET ORAL at 08:12

## 2025-01-02 RX ADMIN — GABAPENTIN 200 MG: 100 CAPSULE ORAL at 08:12

## 2025-01-02 RX ADMIN — GUAIFENESIN 600 MG: 600 TABLET, EXTENDED RELEASE ORAL at 08:12

## 2025-01-02 ASSESSMENT — PAIN SCALES - GENERAL: PAINLEVEL_OUTOF10: 0 - NO PAIN

## 2025-01-02 NOTE — PROGRESS NOTES
01/02/25 1011   Discharge Planning   Living Arrangements Other (Comment)   Support Systems Family members;Parent;/;Home care staff;Organized support group (Comment);Therapist   Expected Discharge Disposition Rehab   Does the patient need discharge transport arranged? Yes   RoundTrip coordination needed? Yes     Patient with discharge orders and [facility] staff to pick patient up and take him back to their inpatient drug rehab facility to complete his 60day program.  RN, patient and his mother are aware.  Toby JALLOH TCC

## 2025-01-02 NOTE — CARE PLAN
The patient's goals for the shift include      The clinical goals for the shift include remain hemodinamically stable    Over the shift, the patient did make progress toward the following goals.

## 2025-01-03 LAB
BACTERIA BLD CULT: NORMAL
BACTERIA BLD CULT: NORMAL

## 2025-01-03 RX ORDER — METHYLPREDNISOLONE 4 MG/1
TABLET ORAL
Start: 2025-01-03 | End: 2025-01-09

## 2025-01-03 RX ORDER — GUAIFENESIN 600 MG/1
600 TABLET, EXTENDED RELEASE ORAL 2 TIMES DAILY
Start: 2025-01-03

## 2025-01-07 LAB
ATRIAL RATE: 121 BPM
P AXIS: 54 DEGREES
PR INTERVAL: 140 MS
Q ONSET: 252 MS
QRS COUNT: 19 BEATS
QRS DURATION: 72 MS
QT INTERVAL: 291 MS
QTC CALCULATION(BAZETT): 415 MS
QTC FREDERICIA: 368 MS
R AXIS: 76 DEGREES
T AXIS: 41 DEGREES
T OFFSET: 397 MS
VENTRICULAR RATE: 122 BPM

## 2025-05-14 ENCOUNTER — OFFICE VISIT (OUTPATIENT)
Dept: PAIN MEDICINE | Facility: HOSPITAL | Age: 40
End: 2025-05-14
Payer: MEDICAID

## 2025-05-14 DIAGNOSIS — F20.9 SCHIZOPHRENIA, UNSPECIFIED TYPE: Primary | ICD-10-CM

## 2025-05-14 DIAGNOSIS — Z87.898 HISTORY OF TREATMENT FOR SUBSTANCE USE DISORDER: ICD-10-CM

## 2025-05-14 DIAGNOSIS — F14.10 COCAINE USE DISORDER: ICD-10-CM

## 2025-05-14 DIAGNOSIS — G89.21 CHRONIC PAIN DUE TO TRAUMA: ICD-10-CM

## 2025-05-14 PROCEDURE — 99203 OFFICE O/P NEW LOW 30 MIN: CPT | Performed by: PAIN MEDICINE

## 2025-05-14 PROCEDURE — 99213 OFFICE O/P EST LOW 20 MIN: CPT | Performed by: PAIN MEDICINE

## 2025-05-14 ASSESSMENT — PAIN - FUNCTIONAL ASSESSMENT: PAIN_FUNCTIONAL_ASSESSMENT: 0-10

## 2025-05-14 ASSESSMENT — PAIN SCALES - GENERAL: PAINLEVEL_OUTOF10: 8
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
